# Patient Record
Sex: FEMALE | Race: WHITE | Employment: OTHER | URBAN - METROPOLITAN AREA
[De-identification: names, ages, dates, MRNs, and addresses within clinical notes are randomized per-mention and may not be internally consistent; named-entity substitution may affect disease eponyms.]

---

## 2017-03-15 ENCOUNTER — OP HISTORICAL/CONVERTED ENCOUNTER (OUTPATIENT)
Dept: OTHER | Age: 82
End: 2017-03-15

## 2017-06-22 ENCOUNTER — OP HISTORICAL/CONVERTED ENCOUNTER (OUTPATIENT)
Dept: OTHER | Age: 82
End: 2017-06-22

## 2017-09-01 ENCOUNTER — OP HISTORICAL/CONVERTED ENCOUNTER (OUTPATIENT)
Dept: OTHER | Age: 82
End: 2017-09-01

## 2017-12-01 ENCOUNTER — OP HISTORICAL/CONVERTED ENCOUNTER (OUTPATIENT)
Dept: OTHER | Age: 82
End: 2017-12-01

## 2018-03-17 ENCOUNTER — ED HISTORICAL/CONVERTED ENCOUNTER (OUTPATIENT)
Dept: OTHER | Age: 83
End: 2018-03-17

## 2018-05-30 ENCOUNTER — OP HISTORICAL/CONVERTED ENCOUNTER (OUTPATIENT)
Dept: OTHER | Age: 83
End: 2018-05-30

## 2018-06-25 ENCOUNTER — OP HISTORICAL/CONVERTED ENCOUNTER (OUTPATIENT)
Dept: OTHER | Age: 83
End: 2018-06-25

## 2018-11-27 ENCOUNTER — OP HISTORICAL/CONVERTED ENCOUNTER (OUTPATIENT)
Dept: OTHER | Age: 83
End: 2018-11-27

## 2020-01-01 ENCOUNTER — APPOINTMENT (OUTPATIENT)
Dept: CT IMAGING | Age: 85
DRG: 689 | End: 2020-01-01
Attending: EMERGENCY MEDICINE
Payer: MEDICARE

## 2020-01-01 ENCOUNTER — APPOINTMENT (OUTPATIENT)
Dept: GENERAL RADIOLOGY | Age: 85
DRG: 689 | End: 2020-01-01
Attending: INTERNAL MEDICINE
Payer: MEDICARE

## 2020-01-01 ENCOUNTER — HOSPICE ADMISSION (OUTPATIENT)
Dept: HOSPICE | Facility: HOSPICE | Age: 85
End: 2020-01-01
Payer: MEDICARE

## 2020-01-01 ENCOUNTER — HOME CARE VISIT (OUTPATIENT)
Dept: HOSPICE | Facility: HOSPICE | Age: 85
End: 2020-01-01
Payer: MEDICARE

## 2020-01-01 ENCOUNTER — HOME CARE VISIT (OUTPATIENT)
Dept: SCHEDULING | Facility: HOME HEALTH | Age: 85
End: 2020-01-01
Payer: MEDICARE

## 2020-01-01 ENCOUNTER — APPOINTMENT (OUTPATIENT)
Dept: NON INVASIVE DIAGNOSTICS | Age: 85
DRG: 689 | End: 2020-01-01
Attending: INTERNAL MEDICINE
Payer: MEDICARE

## 2020-01-01 ENCOUNTER — HOME CARE VISIT (OUTPATIENT)
Dept: HOME HEALTH SERVICES | Facility: HOME HEALTH | Age: 85
End: 2020-01-01
Payer: MEDICARE

## 2020-01-01 ENCOUNTER — OP HISTORICAL/CONVERTED ENCOUNTER (OUTPATIENT)
Dept: OTHER | Age: 85
End: 2020-01-01

## 2020-01-01 ENCOUNTER — HOSPITAL ENCOUNTER (EMERGENCY)
Age: 85
Discharge: HOME | DRG: 689 | End: 2020-09-26
Attending: EMERGENCY MEDICINE | Admitting: EMERGENCY MEDICINE
Payer: MEDICARE

## 2020-01-01 ENCOUNTER — ED HISTORICAL/CONVERTED ENCOUNTER (OUTPATIENT)
Dept: OTHER | Age: 85
End: 2020-01-01

## 2020-01-01 ENCOUNTER — ANESTHESIA EVENT (OUTPATIENT)
Dept: ENDOSCOPY | Age: 85
DRG: 689 | End: 2020-01-01
Payer: MEDICARE

## 2020-01-01 ENCOUNTER — APPOINTMENT (OUTPATIENT)
Dept: MRI IMAGING | Age: 85
DRG: 689 | End: 2020-01-01
Attending: EMERGENCY MEDICINE
Payer: MEDICARE

## 2020-01-01 ENCOUNTER — APPOINTMENT (OUTPATIENT)
Dept: GENERAL RADIOLOGY | Age: 85
DRG: 689 | End: 2020-01-01
Attending: NURSE PRACTITIONER
Payer: MEDICARE

## 2020-01-01 ENCOUNTER — HOSPITAL ENCOUNTER (EMERGENCY)
Age: 85
Discharge: HOME | End: 2020-09-24
Attending: STUDENT IN AN ORGANIZED HEALTH CARE EDUCATION/TRAINING PROGRAM
Payer: MEDICARE

## 2020-01-01 ENCOUNTER — HOSPITAL ENCOUNTER (INPATIENT)
Age: 85
LOS: 16 days | Discharge: HOSPICE | DRG: 689 | End: 2020-10-15
Attending: EMERGENCY MEDICINE | Admitting: HOSPITALIST
Payer: MEDICARE

## 2020-01-01 ENCOUNTER — HOSPITAL ENCOUNTER (INPATIENT)
Age: 85
LOS: 1 days | Discharge: HOSPICE | DRG: 951 | End: 2020-10-16
Attending: FAMILY MEDICINE | Admitting: FAMILY MEDICINE
Payer: OTHER MISCELLANEOUS

## 2020-01-01 ENCOUNTER — ANESTHESIA (OUTPATIENT)
Dept: ENDOSCOPY | Age: 85
DRG: 689 | End: 2020-01-01
Payer: MEDICARE

## 2020-01-01 ENCOUNTER — HOME HEALTH ADMISSION (OUTPATIENT)
Dept: HOME HEALTH SERVICES | Facility: HOME HEALTH | Age: 85
End: 2020-01-01
Payer: MEDICARE

## 2020-01-01 ENCOUNTER — APPOINTMENT (OUTPATIENT)
Dept: GENERAL RADIOLOGY | Age: 85
DRG: 689 | End: 2020-01-01
Attending: EMERGENCY MEDICINE
Payer: MEDICARE

## 2020-01-01 ENCOUNTER — APPOINTMENT (OUTPATIENT)
Dept: GENERAL RADIOLOGY | Age: 85
DRG: 689 | End: 2020-01-01
Attending: HOSPITALIST
Payer: MEDICARE

## 2020-01-01 ENCOUNTER — APPOINTMENT (OUTPATIENT)
Dept: CT IMAGING | Age: 85
DRG: 689 | End: 2020-01-01
Attending: HOSPITALIST
Payer: MEDICARE

## 2020-01-01 ENCOUNTER — IP HISTORICAL/CONVERTED ENCOUNTER (OUTPATIENT)
Dept: OTHER | Age: 85
End: 2020-01-01

## 2020-01-01 ENCOUNTER — APPOINTMENT (OUTPATIENT)
Dept: GENERAL RADIOLOGY | Age: 85
End: 2020-01-01
Attending: STUDENT IN AN ORGANIZED HEALTH CARE EDUCATION/TRAINING PROGRAM
Payer: MEDICARE

## 2020-01-01 VITALS
OXYGEN SATURATION: 100 % | BODY MASS INDEX: 22.6 KG/M2 | HEIGHT: 62 IN | HEART RATE: 79 BPM | WEIGHT: 122.8 LBS | SYSTOLIC BLOOD PRESSURE: 123 MMHG | TEMPERATURE: 98.5 F | RESPIRATION RATE: 18 BRPM | DIASTOLIC BLOOD PRESSURE: 89 MMHG

## 2020-01-01 VITALS
HEIGHT: 62 IN | RESPIRATION RATE: 22 BRPM | WEIGHT: 130 LBS | DIASTOLIC BLOOD PRESSURE: 83 MMHG | TEMPERATURE: 97.6 F | SYSTOLIC BLOOD PRESSURE: 129 MMHG | BODY MASS INDEX: 23.92 KG/M2 | OXYGEN SATURATION: 90 % | HEART RATE: 111 BPM

## 2020-01-01 VITALS
SYSTOLIC BLOOD PRESSURE: 100 MMHG | BODY MASS INDEX: 23.92 KG/M2 | DIASTOLIC BLOOD PRESSURE: 71 MMHG | RESPIRATION RATE: 24 BRPM | OXYGEN SATURATION: 87 % | HEART RATE: 120 BPM | WEIGHT: 130 LBS | HEIGHT: 62 IN | TEMPERATURE: 95.3 F

## 2020-01-01 VITALS
RESPIRATION RATE: 16 BRPM | HEART RATE: 90 BPM | OXYGEN SATURATION: 96 % | TEMPERATURE: 98.2 F | DIASTOLIC BLOOD PRESSURE: 60 MMHG | SYSTOLIC BLOOD PRESSURE: 108 MMHG

## 2020-01-01 VITALS
SYSTOLIC BLOOD PRESSURE: 110 MMHG | DIASTOLIC BLOOD PRESSURE: 64 MMHG | WEIGHT: 125 LBS | HEART RATE: 104 BPM | RESPIRATION RATE: 16 BRPM | OXYGEN SATURATION: 98 % | TEMPERATURE: 96.9 F

## 2020-01-01 VITALS
DIASTOLIC BLOOD PRESSURE: 77 MMHG | TEMPERATURE: 98 F | SYSTOLIC BLOOD PRESSURE: 128 MMHG | WEIGHT: 125 LBS | HEIGHT: 62 IN | RESPIRATION RATE: 15 BRPM | OXYGEN SATURATION: 97 % | HEART RATE: 99 BPM | BODY MASS INDEX: 23 KG/M2

## 2020-01-01 VITALS
OXYGEN SATURATION: 75 % | TEMPERATURE: 98.1 F | HEART RATE: 119 BPM | RESPIRATION RATE: 20 BRPM | DIASTOLIC BLOOD PRESSURE: 120 MMHG | SYSTOLIC BLOOD PRESSURE: 162 MMHG

## 2020-01-01 DIAGNOSIS — Z51.5 HOSPICE CARE: ICD-10-CM

## 2020-01-01 DIAGNOSIS — G45.9 TIA (TRANSIENT ISCHEMIC ATTACK): ICD-10-CM

## 2020-01-01 DIAGNOSIS — N28.9 RENAL INSUFFICIENCY: ICD-10-CM

## 2020-01-01 DIAGNOSIS — R11.0 NAUSEA WITHOUT VOMITING: Primary | ICD-10-CM

## 2020-01-01 DIAGNOSIS — R47.81 SLURRED SPEECH: ICD-10-CM

## 2020-01-01 DIAGNOSIS — I50.84 END STAGE HEART FAILURE (HCC): ICD-10-CM

## 2020-01-01 DIAGNOSIS — I48.20 CHRONIC ATRIAL FIBRILLATION (HCC): ICD-10-CM

## 2020-01-01 DIAGNOSIS — R47.81 SLURRED SPEECH: Primary | ICD-10-CM

## 2020-01-01 DIAGNOSIS — R29.898 WEAKNESS OF EXTREMITY: ICD-10-CM

## 2020-01-01 DIAGNOSIS — R25.1 EPISODE OF SHAKING: Primary | ICD-10-CM

## 2020-01-01 DIAGNOSIS — R06.02 SHORTNESS OF BREATH: ICD-10-CM

## 2020-01-01 DIAGNOSIS — N39.0 URINARY TRACT INFECTION WITHOUT HEMATURIA, SITE UNSPECIFIED: ICD-10-CM

## 2020-01-01 DIAGNOSIS — E04.1 THYROID CYST: ICD-10-CM

## 2020-01-01 DIAGNOSIS — R53.1 WEAKNESS: ICD-10-CM

## 2020-01-01 LAB
ALBUMIN SERPL-MCNC: 2.3 G/DL (ref 3.5–5)
ALBUMIN SERPL-MCNC: 2.4 G/DL (ref 3.5–5)
ALBUMIN SERPL-MCNC: 2.5 G/DL (ref 3.5–5)
ALBUMIN SERPL-MCNC: 2.8 G/DL (ref 3.5–5)
ALBUMIN SERPL-MCNC: 3.3 G/DL (ref 3.5–5)
ALBUMIN/GLOB SERPL: 0.7 {RATIO} (ref 1.1–2.2)
ALBUMIN/GLOB SERPL: 0.8 {RATIO} (ref 1.1–2.2)
ALP SERPL-CCNC: 110 U/L (ref 45–117)
ALP SERPL-CCNC: 114 U/L (ref 45–117)
ALP SERPL-CCNC: 88 U/L (ref 45–117)
ALP SERPL-CCNC: 95 U/L (ref 45–117)
ALP SERPL-CCNC: 96 U/L (ref 45–117)
ALT SERPL-CCNC: 24 U/L (ref 12–78)
ALT SERPL-CCNC: 24 U/L (ref 12–78)
ALT SERPL-CCNC: 25 U/L (ref 12–78)
ALT SERPL-CCNC: 26 U/L (ref 12–78)
ALT SERPL-CCNC: 36 U/L (ref 12–78)
ANION GAP SERPL CALC-SCNC: 10 MMOL/L (ref 5–15)
ANION GAP SERPL CALC-SCNC: 4 MMOL/L (ref 5–15)
ANION GAP SERPL CALC-SCNC: 5 MMOL/L (ref 5–15)
ANION GAP SERPL CALC-SCNC: 5 MMOL/L (ref 5–15)
ANION GAP SERPL CALC-SCNC: 6 MMOL/L (ref 5–15)
ANION GAP SERPL CALC-SCNC: 7 MMOL/L (ref 5–15)
ANION GAP SERPL CALC-SCNC: 8 MMOL/L (ref 5–15)
ANION GAP SERPL CALC-SCNC: 8 MMOL/L (ref 5–15)
APPEARANCE UR: ABNORMAL
APPEARANCE UR: CLEAR
ARTERIAL PATENCY WRIST A: YES
ARTERIAL PATENCY WRIST A: YES
AST SERPL W P-5'-P-CCNC: 23 U/L (ref 15–37)
AST SERPL-CCNC: 17 U/L (ref 15–37)
AST SERPL-CCNC: 23 U/L (ref 15–37)
AST SERPL-CCNC: 30 U/L (ref 15–37)
AST SERPL-CCNC: 35 U/L (ref 15–37)
ATRIAL RATE: 117 BPM
ATRIAL RATE: 119 BPM
ATRIAL RATE: 156 BPM
ATRIAL RATE: 227 BPM
ATRIAL RATE: 286 BPM
ATRIAL RATE: 97 BPM
BACTERIA SPEC CULT: ABNORMAL
BACTERIA SPEC CULT: NORMAL
BACTERIA URNS QL MICRO: ABNORMAL /HPF
BACTERIA URNS QL MICRO: NEGATIVE /HPF
BASE DEFICIT BLD-SCNC: 1 MMOL/L
BASE EXCESS BLD CALC-SCNC: 2 MMOL/L
BASOPHILS # BLD: 0 K/UL (ref 0–0.1)
BASOPHILS # BLD: 0.1 K/UL (ref 0–0.1)
BASOPHILS NFR BLD: 0 % (ref 0–1)
BASOPHILS NFR BLD: 1 % (ref 0–1)
BDY SITE: ABNORMAL
BDY SITE: ABNORMAL
BILIRUB SERPL-MCNC: 0.7 MG/DL (ref 0.2–1)
BILIRUB SERPL-MCNC: 0.8 MG/DL (ref 0.2–1)
BILIRUB SERPL-MCNC: 0.8 MG/DL (ref 0.2–1)
BILIRUB SERPL-MCNC: 1.3 MG/DL (ref 0.2–1)
BILIRUB SERPL-MCNC: 1.4 MG/DL (ref 0.2–1)
BILIRUB UR QL: NEGATIVE
BILIRUB UR QL: NEGATIVE
BNP SERPL-MCNC: 8279 PG/ML
BUN SERPL-MCNC: 10 MG/DL (ref 6–20)
BUN SERPL-MCNC: 10 MG/DL (ref 6–20)
BUN SERPL-MCNC: 11 MG/DL (ref 6–20)
BUN SERPL-MCNC: 12 MG/DL (ref 6–20)
BUN SERPL-MCNC: 13 MG/DL (ref 6–20)
BUN SERPL-MCNC: 14 MG/DL (ref 6–20)
BUN SERPL-MCNC: 16 MG/DL (ref 6–20)
BUN SERPL-MCNC: 16 MG/DL (ref 6–20)
BUN SERPL-MCNC: 17 MG/DL (ref 6–20)
BUN SERPL-MCNC: 18 MG/DL (ref 6–20)
BUN SERPL-MCNC: 8 MG/DL (ref 6–20)
BUN SERPL-MCNC: 9 MG/DL (ref 6–20)
BUN/CREAT SERPL: 11 (ref 12–20)
BUN/CREAT SERPL: 12 (ref 12–20)
BUN/CREAT SERPL: 13 (ref 12–20)
BUN/CREAT SERPL: 14 (ref 12–20)
BUN/CREAT SERPL: 14 (ref 12–20)
BUN/CREAT SERPL: 15 (ref 12–20)
BUN/CREAT SERPL: 16 (ref 12–20)
BUN/CREAT SERPL: 17 (ref 12–20)
BUN/CREAT SERPL: 18 (ref 12–20)
CA-I BLD-MCNC: 9.3 MG/DL (ref 8.5–10.1)
CA-I BLD-SCNC: 1.16 MMOL/L (ref 1.12–1.32)
CA-I BLD-SCNC: 1.27 MMOL/L (ref 1.12–1.32)
CALCIUM SERPL-MCNC: 7.9 MG/DL (ref 8.5–10.1)
CALCIUM SERPL-MCNC: 8 MG/DL (ref 8.5–10.1)
CALCIUM SERPL-MCNC: 8 MG/DL (ref 8.5–10.1)
CALCIUM SERPL-MCNC: 8.1 MG/DL (ref 8.5–10.1)
CALCIUM SERPL-MCNC: 8.1 MG/DL (ref 8.5–10.1)
CALCIUM SERPL-MCNC: 8.2 MG/DL (ref 8.5–10.1)
CALCIUM SERPL-MCNC: 8.3 MG/DL (ref 8.5–10.1)
CALCIUM SERPL-MCNC: 8.3 MG/DL (ref 8.5–10.1)
CALCIUM SERPL-MCNC: 8.5 MG/DL (ref 8.5–10.1)
CALCIUM SERPL-MCNC: 8.6 MG/DL (ref 8.5–10.1)
CALCIUM SERPL-MCNC: 8.7 MG/DL (ref 8.5–10.1)
CALCULATED R AXIS, ECG10: 10 DEGREES
CALCULATED R AXIS, ECG10: 37 DEGREES
CALCULATED R AXIS, ECG10: 4 DEGREES
CALCULATED R AXIS, ECG10: 43 DEGREES
CALCULATED R AXIS, ECG10: 45 DEGREES
CALCULATED R AXIS, ECG10: 9 DEGREES
CALCULATED T AXIS, ECG11: -102 DEGREES
CALCULATED T AXIS, ECG11: -27 DEGREES
CALCULATED T AXIS, ECG11: -33 DEGREES
CALCULATED T AXIS, ECG11: -82 DEGREES
CALCULATED T AXIS, ECG11: 20 DEGREES
CALCULATED T AXIS, ECG11: 82 DEGREES
CC UR VC: ABNORMAL
CHLORIDE SERPL-SCNC: 100 MMOL/L (ref 97–108)
CHLORIDE SERPL-SCNC: 101 MMOL/L (ref 97–108)
CHLORIDE SERPL-SCNC: 104 MMOL/L (ref 97–108)
CHLORIDE SERPL-SCNC: 105 MMOL/L (ref 97–108)
CHLORIDE SERPL-SCNC: 105 MMOL/L (ref 97–108)
CHLORIDE SERPL-SCNC: 106 MMOL/L (ref 97–108)
CHLORIDE SERPL-SCNC: 106 MMOL/L (ref 97–108)
CHOLEST SERPL-MCNC: 123 MG/DL
CO2 SERPL-SCNC: 23 MMOL/L (ref 21–32)
CO2 SERPL-SCNC: 24 MMOL/L (ref 21–32)
CO2 SERPL-SCNC: 25 MMOL/L (ref 21–32)
CO2 SERPL-SCNC: 25 MMOL/L (ref 21–32)
CO2 SERPL-SCNC: 26 MMOL/L (ref 21–32)
CO2 SERPL-SCNC: 27 MMOL/L (ref 21–32)
CO2 SERPL-SCNC: 28 MMOL/L (ref 21–32)
COLOR UR: ABNORMAL
COLOR UR: ABNORMAL
COMMENT, HOLDF: NORMAL
COVID-19, XGCOVT: NOT DETECTED
CREAT SERPL-MCNC: 0.63 MG/DL (ref 0.55–1.02)
CREAT SERPL-MCNC: 0.75 MG/DL (ref 0.55–1.02)
CREAT SERPL-MCNC: 0.78 MG/DL (ref 0.55–1.02)
CREAT SERPL-MCNC: 0.78 MG/DL (ref 0.55–1.02)
CREAT SERPL-MCNC: 0.79 MG/DL (ref 0.55–1.02)
CREAT SERPL-MCNC: 0.83 MG/DL (ref 0.55–1.02)
CREAT SERPL-MCNC: 0.83 MG/DL (ref 0.55–1.02)
CREAT SERPL-MCNC: 0.84 MG/DL (ref 0.55–1.02)
CREAT SERPL-MCNC: 0.89 MG/DL (ref 0.55–1.02)
CREAT SERPL-MCNC: 0.9 MG/DL (ref 0.55–1.02)
CREAT SERPL-MCNC: 0.98 MG/DL (ref 0.55–1.02)
CREAT SERPL-MCNC: 1 MG/DL (ref 0.55–1.02)
CREAT SERPL-MCNC: 1.13 MG/DL (ref 0.55–1.02)
CREAT SERPL-MCNC: 1.21 MG/DL (ref 0.55–1.02)
DIAGNOSIS, 93000: NORMAL
DIFFERENTIAL METHOD BLD: ABNORMAL
ECHO AR MAX VEL PISA: 375.42 CM/S
ECHO AV REGURGITANT PHT: 0.71 S
ECHO LA AREA 4C: 31.38 CM2
ECHO LA MAJOR AXIS: 4.61 CM
ECHO LA MINOR AXIS: 2.9 CM
ECHO LA TO AORTIC ROOT RATIO: 1.66
ECHO LA TO AORTIC ROOT RATIO: 1.66
ECHO LA VOL 4C: 117.2 ML (ref 22–52)
ECHO LA VOLUME INDEX A4C: 73.63 ML/M2 (ref 16–28)
ECHO LV INTERNAL DIMENSION DIASTOLIC: 3.9 CM (ref 3.9–5.3)
ECHO LV INTERNAL DIMENSION SYSTOLIC: 3.05 CM
ECHO LV IVSD: 1 CM (ref 0.6–0.9)
ECHO LV MASS 2D: 115.3 G (ref 67–162)
ECHO LV MASS INDEX 2D: 72.4 G/M2 (ref 43–95)
ECHO LV POSTERIOR WALL DIASTOLIC: 0.92 CM (ref 0.6–0.9)
ECHO LVOT PEAK GRADIENT: 0.9 MMHG
ECHO LVOT PEAK VELOCITY: 47.54 CM/S
ECHO RV INTERNAL DIMENSION: 3.87 CM
ECHO RV TAPSE: 1.13 CM (ref 1.5–2)
ECHO TV REGURGITANT MAX VELOCITY: 311.44 CM/S
ECHO TV REGURGITANT MAX VELOCITY: 347.51 CM/S
ECHO TV REGURGITANT MAX VELOCITY: 356.07 CM/S
ECHO TV REGURGITANT MAX VELOCITY: 356.07 CM/S
ECHO TV REGURGITANT PEAK GRADIENT: 38.8 MMHG
ECHO TV REGURGITANT PEAK GRADIENT: 48.31 MMHG
ECHO TV REGURGITANT PEAK GRADIENT: 50.71 MMHG
ECHO TV REGURGITANT PEAK GRADIENT: 50.71 MMHG
EOSINOPHIL # BLD: 0.1 K/UL (ref 0–0.4)
EOSINOPHIL # BLD: 0.2 K/UL (ref 0–0.4)
EOSINOPHIL # BLD: 0.2 K/UL (ref 0–0.4)
EOSINOPHIL # BLD: 0.3 K/UL (ref 0–0.4)
EOSINOPHIL # BLD: 0.4 K/UL (ref 0–0.4)
EOSINOPHIL # BLD: 0.5 K/UL (ref 0–0.4)
EOSINOPHIL # BLD: 0.5 K/UL (ref 0–0.4)
EOSINOPHIL NFR BLD: 1 % (ref 0–7)
EOSINOPHIL NFR BLD: 1 % (ref 0–7)
EOSINOPHIL NFR BLD: 2 % (ref 0–7)
EOSINOPHIL NFR BLD: 3 % (ref 0–7)
EOSINOPHIL NFR BLD: 5 % (ref 0–7)
EOSINOPHIL NFR BLD: 5 % (ref 0–7)
EOSINOPHIL NFR BLD: 6 % (ref 0–7)
EPITH CASTS URNS QL MICRO: ABNORMAL /LPF
EPITH CASTS URNS QL MICRO: ABNORMAL /LPF
ERYTHROCYTE [DISTWIDTH] IN BLOOD BY AUTOMATED COUNT: 16.9 % (ref 11.5–14.5)
ERYTHROCYTE [DISTWIDTH] IN BLOOD BY AUTOMATED COUNT: 17.2 % (ref 11.5–14.5)
ERYTHROCYTE [DISTWIDTH] IN BLOOD BY AUTOMATED COUNT: 17.3 % (ref 11.5–14.5)
ERYTHROCYTE [DISTWIDTH] IN BLOOD BY AUTOMATED COUNT: 17.4 % (ref 11.5–14.5)
ERYTHROCYTE [DISTWIDTH] IN BLOOD BY AUTOMATED COUNT: 17.5 % (ref 11.5–14.5)
ERYTHROCYTE [DISTWIDTH] IN BLOOD BY AUTOMATED COUNT: 17.8 % (ref 11.5–14.5)
ERYTHROCYTE [DISTWIDTH] IN BLOOD BY AUTOMATED COUNT: 17.9 % (ref 11.5–14.5)
ERYTHROCYTE [DISTWIDTH] IN BLOOD BY AUTOMATED COUNT: 18 % (ref 11.5–14.5)
ERYTHROCYTE [DISTWIDTH] IN BLOOD BY AUTOMATED COUNT: 18.1 % (ref 11.5–14.5)
ERYTHROCYTE [DISTWIDTH] IN BLOOD BY AUTOMATED COUNT: 18.2 % (ref 11.5–14.5)
ERYTHROCYTE [DISTWIDTH] IN BLOOD BY AUTOMATED COUNT: 18.2 % (ref 11.5–14.5)
EST. AVERAGE GLUCOSE BLD GHB EST-MCNC: 114 MG/DL
GAS FLOW.O2 O2 DELIVERY SYS: ABNORMAL L/MIN
GAS FLOW.O2 O2 DELIVERY SYS: ABNORMAL L/MIN
GAS FLOW.O2 SETTING OXYMISER: 6 L/M
GLOBULIN SER CALC-MCNC: 3.2 G/DL (ref 2–4)
GLOBULIN SER CALC-MCNC: 3.4 G/DL (ref 2–4)
GLOBULIN SER CALC-MCNC: 3.7 G/DL (ref 2–4)
GLOBULIN SER CALC-MCNC: 3.9 G/DL (ref 2–4)
GLOBULIN SER CALC-MCNC: 4.3 G/DL (ref 2–4)
GLUCOSE BLD STRIP.AUTO-MCNC: 100 MG/DL (ref 65–100)
GLUCOSE BLD STRIP.AUTO-MCNC: 102 MG/DL (ref 65–100)
GLUCOSE BLD STRIP.AUTO-MCNC: 104 MG/DL (ref 65–100)
GLUCOSE BLD STRIP.AUTO-MCNC: 105 MG/DL (ref 65–100)
GLUCOSE BLD STRIP.AUTO-MCNC: 107 MG/DL (ref 65–100)
GLUCOSE BLD STRIP.AUTO-MCNC: 108 MG/DL (ref 65–100)
GLUCOSE BLD STRIP.AUTO-MCNC: 110 MG/DL (ref 65–100)
GLUCOSE BLD STRIP.AUTO-MCNC: 111 MG/DL (ref 65–100)
GLUCOSE BLD STRIP.AUTO-MCNC: 117 MG/DL (ref 65–100)
GLUCOSE BLD STRIP.AUTO-MCNC: 122 MG/DL (ref 65–100)
GLUCOSE BLD STRIP.AUTO-MCNC: 124 MG/DL (ref 65–100)
GLUCOSE BLD STRIP.AUTO-MCNC: 128 MG/DL (ref 65–100)
GLUCOSE BLD STRIP.AUTO-MCNC: 128 MG/DL (ref 65–100)
GLUCOSE BLD STRIP.AUTO-MCNC: 131 MG/DL (ref 65–100)
GLUCOSE BLD STRIP.AUTO-MCNC: 132 MG/DL (ref 65–100)
GLUCOSE BLD STRIP.AUTO-MCNC: 141 MG/DL (ref 65–100)
GLUCOSE BLD STRIP.AUTO-MCNC: 143 MG/DL (ref 65–100)
GLUCOSE BLD STRIP.AUTO-MCNC: 155 MG/DL (ref 65–100)
GLUCOSE BLD STRIP.AUTO-MCNC: 157 MG/DL (ref 65–100)
GLUCOSE BLD STRIP.AUTO-MCNC: 159 MG/DL (ref 65–100)
GLUCOSE BLD STRIP.AUTO-MCNC: 166 MG/DL (ref 65–100)
GLUCOSE BLD STRIP.AUTO-MCNC: 166 MG/DL (ref 65–100)
GLUCOSE BLD STRIP.AUTO-MCNC: 81 MG/DL (ref 65–100)
GLUCOSE BLD STRIP.AUTO-MCNC: 82 MG/DL (ref 65–100)
GLUCOSE BLD STRIP.AUTO-MCNC: 84 MG/DL (ref 65–100)
GLUCOSE BLD STRIP.AUTO-MCNC: 84 MG/DL (ref 65–100)
GLUCOSE BLD STRIP.AUTO-MCNC: 96 MG/DL (ref 65–100)
GLUCOSE BLD STRIP.AUTO-MCNC: 97 MG/DL (ref 65–100)
GLUCOSE BLD STRIP.AUTO-MCNC: 99 MG/DL (ref 65–100)
GLUCOSE SERPL-MCNC: 103 MG/DL (ref 65–100)
GLUCOSE SERPL-MCNC: 104 MG/DL (ref 65–100)
GLUCOSE SERPL-MCNC: 115 MG/DL (ref 65–100)
GLUCOSE SERPL-MCNC: 115 MG/DL (ref 65–100)
GLUCOSE SERPL-MCNC: 125 MG/DL (ref 65–100)
GLUCOSE SERPL-MCNC: 132 MG/DL (ref 65–100)
GLUCOSE SERPL-MCNC: 134 MG/DL (ref 65–100)
GLUCOSE SERPL-MCNC: 135 MG/DL (ref 65–100)
GLUCOSE SERPL-MCNC: 157 MG/DL (ref 65–100)
GLUCOSE SERPL-MCNC: 160 MG/DL (ref 65–100)
GLUCOSE SERPL-MCNC: 90 MG/DL (ref 65–100)
GLUCOSE SERPL-MCNC: 91 MG/DL (ref 65–100)
GLUCOSE SERPL-MCNC: 94 MG/DL (ref 65–100)
GLUCOSE SERPL-MCNC: 95 MG/DL (ref 65–100)
GLUCOSE UR STRIP.AUTO-MCNC: NEGATIVE MG/DL
GLUCOSE UR STRIP.AUTO-MCNC: NEGATIVE MG/DL
HBA1C MFR BLD: 5.6 % (ref 4–5.6)
HCO3 BLD-SCNC: 22.9 MMOL/L (ref 22–26)
HCO3 BLD-SCNC: 26 MMOL/L (ref 22–26)
HCT VFR BLD AUTO: 38.1 % (ref 35–47)
HCT VFR BLD AUTO: 39 % (ref 35–47)
HCT VFR BLD AUTO: 39.2 % (ref 35–47)
HCT VFR BLD AUTO: 39.7 % (ref 35–47)
HCT VFR BLD AUTO: 39.9 % (ref 35–47)
HCT VFR BLD AUTO: 39.9 % (ref 35–47)
HCT VFR BLD AUTO: 40.5 % (ref 35–47)
HCT VFR BLD AUTO: 40.7 % (ref 35–47)
HCT VFR BLD AUTO: 41.5 % (ref 35–47)
HCT VFR BLD AUTO: 43.1 % (ref 35–47)
HCT VFR BLD AUTO: 43.9 % (ref 35–47)
HCT VFR BLD AUTO: 45.3 % (ref 35–47)
HCT VFR BLD AUTO: 46 % (ref 35–47)
HDLC SERPL-MCNC: 53 MG/DL
HDLC SERPL: 2.3 {RATIO} (ref 0–5)
HEALTH STATUS, XMCV2T: NORMAL
HGB BLD-MCNC: 12.7 G/DL (ref 11.5–16)
HGB BLD-MCNC: 13 G/DL (ref 11.5–16)
HGB BLD-MCNC: 13.1 G/DL (ref 11.5–16)
HGB BLD-MCNC: 13.1 G/DL (ref 11.5–16)
HGB BLD-MCNC: 13.4 G/DL (ref 11.5–16)
HGB BLD-MCNC: 13.8 G/DL (ref 11.5–16)
HGB BLD-MCNC: 13.8 G/DL (ref 11.5–16)
HGB BLD-MCNC: 14.5 % (ref 11.5–16)
HGB BLD-MCNC: 14.6 G/DL (ref 11.5–16)
HGB BLD-MCNC: 14.8 G/DL (ref 11.5–16)
HGB BLD-MCNC: 14.9 G/DL (ref 11.5–16)
HGB UR QL STRIP: ABNORMAL
HGB UR QL STRIP: ABNORMAL
HYALINE CASTS URNS QL MICRO: ABNORMAL /LPF (ref 0–5)
IMM GRANULOCYTES # BLD AUTO: 0.1 K/UL (ref 0–0.04)
IMM GRANULOCYTES NFR BLD AUTO: 1 % (ref 0–0.5)
INR PPP: 1.1 (ref 0.9–1.1)
INR PPP: 1.2 (ref 0.9–1.1)
INR PPP: 1.4 (ref 0.9–1.1)
KETONES UR QL STRIP.AUTO: NEGATIVE MG/DL
KETONES UR QL STRIP.AUTO: NEGATIVE MG/DL
LDLC SERPL CALC-MCNC: 55.4 MG/DL (ref 0–100)
LEUKOCYTE ESTERASE UR QL STRIP.AUTO: ABNORMAL
LEUKOCYTE ESTERASE UR QL STRIP.AUTO: ABNORMAL
LIPID PROFILE,FLP: NORMAL
LYMPHOCYTES # BLD: 1 K/UL (ref 0.8–3.5)
LYMPHOCYTES # BLD: 1.1 K/UL (ref 0.8–3.5)
LYMPHOCYTES # BLD: 1.1 K/UL (ref 0.8–3.5)
LYMPHOCYTES # BLD: 1.2 K/UL (ref 0.8–3.5)
LYMPHOCYTES # BLD: 1.2 K/UL (ref 0.8–3.5)
LYMPHOCYTES # BLD: 1.4 K/UL (ref 0.8–3.5)
LYMPHOCYTES NFR BLD: 10 % (ref 12–49)
LYMPHOCYTES NFR BLD: 11 % (ref 12–49)
LYMPHOCYTES NFR BLD: 11 % (ref 12–49)
LYMPHOCYTES NFR BLD: 12 % (ref 12–49)
LYMPHOCYTES NFR BLD: 14 % (ref 12–49)
LYMPHOCYTES NFR BLD: 15 % (ref 12–49)
LYMPHOCYTES NFR BLD: 9 % (ref 12–49)
MAGNESIUM SERPL-MCNC: 1.8 MG/DL (ref 1.6–2.4)
MAGNESIUM SERPL-MCNC: 1.8 MG/DL (ref 1.6–2.4)
MCH RBC QN AUTO: 32.7 PG (ref 26–34)
MCH RBC QN AUTO: 32.8 PG (ref 26–34)
MCH RBC QN AUTO: 32.9 PG (ref 26–34)
MCH RBC QN AUTO: 32.9 PG (ref 26–34)
MCH RBC QN AUTO: 33.1 PG (ref 26–34)
MCH RBC QN AUTO: 33.2 PG (ref 26–34)
MCH RBC QN AUTO: 33.2 PG (ref 26–34)
MCH RBC QN AUTO: 33.3 PG (ref 26–34)
MCH RBC QN AUTO: 33.3 PG (ref 26–34)
MCH RBC QN AUTO: 33.4 PG (ref 26–34)
MCH RBC QN AUTO: 33.4 PG (ref 26–34)
MCH RBC QN AUTO: 33.6 PG (ref 26–34)
MCH RBC QN AUTO: 33.7 PG (ref 26–34)
MCHC RBC AUTO-ENTMCNC: 32.4 G/DL (ref 30–36.5)
MCHC RBC AUTO-ENTMCNC: 32.7 G/DL (ref 30–36.5)
MCHC RBC AUTO-ENTMCNC: 32.8 G/DL (ref 30–36.5)
MCHC RBC AUTO-ENTMCNC: 32.9 G/DL (ref 30–36.5)
MCHC RBC AUTO-ENTMCNC: 33.3 G/DL (ref 30–36.5)
MCHC RBC AUTO-ENTMCNC: 33.4 G/DL (ref 30–36.5)
MCHC RBC AUTO-ENTMCNC: 33.6 G/DL (ref 30–36.5)
MCHC RBC AUTO-ENTMCNC: 33.6 G/DL (ref 30–36.5)
MCHC RBC AUTO-ENTMCNC: 33.8 G/DL (ref 30–36.5)
MCHC RBC AUTO-ENTMCNC: 34.1 G/DL (ref 30–36.5)
MCV RBC AUTO: 100 FL (ref 80–99)
MCV RBC AUTO: 100.9 FL (ref 80–99)
MCV RBC AUTO: 102.4 FL (ref 80–99)
MCV RBC AUTO: 97.4 FL (ref 80–99)
MCV RBC AUTO: 98.5 FL (ref 80–99)
MCV RBC AUTO: 99.2 FL (ref 80–99)
MCV RBC AUTO: 99.3 FL (ref 80–99)
MCV RBC AUTO: 99.5 FL (ref 80–99)
MCV RBC AUTO: 99.7 FL (ref 80–99)
MONOCYTES # BLD: 0.6 K/UL (ref 0–1)
MONOCYTES # BLD: 0.8 K/UL (ref 0–1)
MONOCYTES # BLD: 0.9 K/UL (ref 0–1)
MONOCYTES # BLD: 1 K/UL (ref 0–1)
MONOCYTES # BLD: 1 K/UL (ref 0–1)
MONOCYTES # BLD: 1.1 K/UL (ref 0–1)
MONOCYTES NFR BLD: 10 % (ref 5–13)
MONOCYTES NFR BLD: 11 % (ref 5–13)
MONOCYTES NFR BLD: 12 % (ref 5–13)
MONOCYTES NFR BLD: 8 % (ref 5–13)
MONOCYTES NFR BLD: 9 % (ref 5–13)
NEUTS SEG # BLD: 5.8 K/UL (ref 1.8–8)
NEUTS SEG # BLD: 5.9 K/UL (ref 1.8–8)
NEUTS SEG # BLD: 6.4 K/UL (ref 1.8–8)
NEUTS SEG # BLD: 6.8 K/UL (ref 1.8–8)
NEUTS SEG # BLD: 7.3 K/UL (ref 1.8–8)
NEUTS SEG # BLD: 7.5 K/UL (ref 1.8–8)
NEUTS SEG # BLD: 8.9 K/UL (ref 1.8–8)
NEUTS SEG # BLD: 9.6 K/UL (ref 1.8–8)
NEUTS SEG NFR BLD: 69 % (ref 32–75)
NEUTS SEG NFR BLD: 71 % (ref 32–75)
NEUTS SEG NFR BLD: 72 % (ref 32–75)
NEUTS SEG NFR BLD: 73 % (ref 32–75)
NEUTS SEG NFR BLD: 74 % (ref 32–75)
NEUTS SEG NFR BLD: 74 % (ref 32–75)
NEUTS SEG NFR BLD: 75 % (ref 32–75)
NEUTS SEG NFR BLD: 77 % (ref 32–75)
NEUTS SEG NFR BLD: 78 % (ref 32–75)
NEUTS SEG NFR BLD: 80 % (ref 32–75)
NITRITE UR QL STRIP.AUTO: NEGATIVE
NITRITE UR QL STRIP.AUTO: POSITIVE
NRBC # BLD: 0 K/UL (ref 0–0.01)
NRBC # BLD: 0.02 K/UL (ref 0–0.01)
NRBC # BLD: 0.04 K/UL (ref 0–0.01)
NRBC # BLD: 0.04 K/UL (ref 0–0.01)
NRBC BLD-RTO: 0 PER 100 WBC
NRBC BLD-RTO: 0.2 PER 100 WBC
NRBC BLD-RTO: 0.4 PER 100 WBC
NRBC BLD-RTO: 0.4 PER 100 WBC
O2/TOTAL GAS SETTING VFR VENT: 0.21 %
PCO2 BLD: 32.8 MMHG (ref 35–45)
PCO2 BLD: 37.6 MMHG (ref 35–45)
PH BLD: 7.45 [PH] (ref 7.35–7.45)
PH BLD: 7.45 [PH] (ref 7.35–7.45)
PH UR STRIP: 6.5 [PH] (ref 5–8)
PH UR STRIP: 7.5 [PH] (ref 5–8)
PHOSPHATE SERPL-MCNC: 3 MG/DL (ref 2.6–4.7)
PLATELET # BLD AUTO: 163 K/UL (ref 150–400)
PLATELET # BLD AUTO: 168 K/UL (ref 150–400)
PLATELET # BLD AUTO: 175 K/UL (ref 150–400)
PLATELET # BLD AUTO: 184 K/UL (ref 150–400)
PLATELET # BLD AUTO: 193 K/UL (ref 150–400)
PLATELET # BLD AUTO: 193 K/UL (ref 150–400)
PLATELET # BLD AUTO: 202 K/UL (ref 150–400)
PLATELET # BLD AUTO: 217 K/UL (ref 150–400)
PLATELET # BLD AUTO: 220 K/UL (ref 150–400)
PLATELET # BLD AUTO: 248 K/UL (ref 150–400)
PLATELET # BLD AUTO: 270 K/UL (ref 150–400)
PLATELET # BLD AUTO: 278 K/UL (ref 150–400)
PLATELET # BLD AUTO: 358 K/UL (ref 150–400)
PMV BLD AUTO: 10.2 FL (ref 8.9–12.9)
PMV BLD AUTO: 10.3 FL (ref 8.9–12.9)
PMV BLD AUTO: 10.4 FL (ref 8.9–12.9)
PMV BLD AUTO: 10.4 FL (ref 8.9–12.9)
PMV BLD AUTO: 10.6 FL (ref 8.9–12.9)
PMV BLD AUTO: 10.8 FL (ref 8.9–12.9)
PMV BLD AUTO: 10.9 FL (ref 8.9–12.9)
PO2 BLD: 66 MMHG (ref 80–100)
PO2 BLD: 67 MMHG (ref 80–100)
POTASSIUM SERPL-SCNC: 2.9 MMOL/L (ref 3.5–5.1)
POTASSIUM SERPL-SCNC: 3 MMOL/L (ref 3.5–5.1)
POTASSIUM SERPL-SCNC: 3.1 MMOL/L (ref 3.5–5.1)
POTASSIUM SERPL-SCNC: 3.1 MMOL/L (ref 3.5–5.1)
POTASSIUM SERPL-SCNC: 3.2 MMOL/L (ref 3.5–5.1)
POTASSIUM SERPL-SCNC: 3.3 MMOL/L (ref 3.5–5.1)
POTASSIUM SERPL-SCNC: 3.6 MMOL/L (ref 3.5–5.1)
POTASSIUM SERPL-SCNC: 3.8 MMOL/L (ref 3.5–5.1)
POTASSIUM SERPL-SCNC: 3.9 MMOL/L (ref 3.5–5.1)
POTASSIUM SERPL-SCNC: 4.1 MMOL/L (ref 3.5–5.1)
POTASSIUM SERPL-SCNC: 4.1 MMOL/L (ref 3.5–5.1)
POTASSIUM SERPL-SCNC: 4.3 MMOL/L (ref 3.5–5.1)
POTASSIUM SERPL-SCNC: 4.4 MMOL/L (ref 3.5–5.1)
POTASSIUM SERPL-SCNC: 4.6 MMOL/L (ref 3.5–5.1)
PROT SERPL-MCNC: 5.5 G/DL (ref 6.4–8.2)
PROT SERPL-MCNC: 5.8 G/DL (ref 6.4–8.2)
PROT SERPL-MCNC: 6.2 G/DL (ref 6.4–8.2)
PROT SERPL-MCNC: 6.7 G/DL (ref 6.4–8.2)
PROT SERPL-MCNC: 7.6 G/DL (ref 6.4–8.2)
PROT UR STRIP-MCNC: ABNORMAL MG/DL
PROT UR STRIP-MCNC: NEGATIVE MG/DL
PROTHROMBIN TIME: 11.5 SEC (ref 9–11.1)
PROTHROMBIN TIME: 11.8 SEC (ref 9–11.1)
PROTHROMBIN TIME: 17.5 SEC (ref 11.9–14.7)
Q-T INTERVAL, ECG07: 370 MS
Q-T INTERVAL, ECG07: 374 MS
Q-T INTERVAL, ECG07: 382 MS
Q-T INTERVAL, ECG07: 384 MS
Q-T INTERVAL, ECG07: 402 MS
Q-T INTERVAL, ECG07: 418 MS
QRS DURATION, ECG06: 88 MS
QRS DURATION, ECG06: 90 MS
QRS DURATION, ECG06: 92 MS
QRS DURATION, ECG06: 96 MS
QRS DURATION, ECG06: 96 MS
QRS DURATION, ECG06: 98 MS
QTC CALCULATION (BEZET), ECG08: 490 MS
QTC CALCULATION (BEZET), ECG08: 491 MS
QTC CALCULATION (BEZET), ECG08: 497 MS
QTC CALCULATION (BEZET), ECG08: 502 MS
QTC CALCULATION (BEZET), ECG08: 515 MS
QTC CALCULATION (BEZET), ECG08: 547 MS
RBC # BLD AUTO: 3.84 M/UL (ref 3.8–5.2)
RBC # BLD AUTO: 3.9 M/UL (ref 3.8–5.2)
RBC # BLD AUTO: 3.98 M/UL (ref 3.8–5.2)
RBC # BLD AUTO: 3.98 M/UL (ref 3.8–5.2)
RBC # BLD AUTO: 3.99 M/UL (ref 3.8–5.2)
RBC # BLD AUTO: 4.01 M/UL (ref 3.8–5.2)
RBC # BLD AUTO: 4.07 M/UL (ref 3.8–5.2)
RBC # BLD AUTO: 4.15 M/UL (ref 3.8–5.2)
RBC # BLD AUTO: 4.16 M/UL (ref 3.8–5.2)
RBC # BLD AUTO: 4.34 M/UL (ref 3.8–5.2)
RBC # BLD AUTO: 4.35 M/UL (ref 3.8–5.2)
RBC # BLD AUTO: 4.49 M/UL (ref 3.8–5.2)
RBC # BLD AUTO: 4.53 M/UL (ref 3.8–5.2)
RBC #/AREA URNS HPF: ABNORMAL /HPF (ref 0–5)
RBC #/AREA URNS HPF: ABNORMAL /HPF (ref 0–5)
SAMPLES BEING HELD,HOLD: NORMAL
SAO2 % BLD: 94 % (ref 92–97)
SAO2 % BLD: 94 % (ref 92–97)
SERVICE CMNT-IMP: ABNORMAL
SERVICE CMNT-IMP: NORMAL
SODIUM SERPL-SCNC: 131 MMOL/L (ref 136–145)
SODIUM SERPL-SCNC: 132 MMOL/L (ref 136–145)
SODIUM SERPL-SCNC: 134 MMOL/L (ref 136–145)
SODIUM SERPL-SCNC: 134 MMOL/L (ref 136–145)
SODIUM SERPL-SCNC: 135 MMOL/L (ref 136–145)
SODIUM SERPL-SCNC: 136 MMOL/L (ref 136–145)
SODIUM SERPL-SCNC: 136 MMOL/L (ref 136–145)
SODIUM SERPL-SCNC: 137 MMOL/L (ref 136–145)
SODIUM SERPL-SCNC: 138 MMOL/L (ref 136–145)
SODIUM SERPL-SCNC: 138 MMOL/L (ref 136–145)
SODIUM SERPL-SCNC: 139 MMOL/L (ref 136–145)
SOURCE, COVRS: NORMAL
SP GR UR REFRACTOMETRY: 1.01 (ref 1–1.03)
SP GR UR REFRACTOMETRY: <1.005
SPECIMEN SOURCE, FCOV2M: NORMAL
SPECIMEN TYPE, XMCV1T: NORMAL
SPECIMEN TYPE: ABNORMAL
SPECIMEN TYPE: ABNORMAL
T4 FREE SERPL-MCNC: 1.3 NG/DL (ref 0.8–1.5)
TRIGL SERPL-MCNC: 73 MG/DL (ref ?–150)
TROPONIN I SERPL-MCNC: <0.05 NG/ML
TSH SERPL DL<=0.05 MIU/L-ACNC: 8.34 UIU/ML (ref 0.36–3.74)
UA: UC IF INDICATED,UAUC: ABNORMAL
UR CULT HOLD, URHOLD: NORMAL
UROBILINOGEN UR QL STRIP.AUTO: 0.2 EU/DL (ref 0.2–1)
UROBILINOGEN UR QL STRIP.AUTO: 1 EU/DL (ref 0.2–1)
VENTRICULAR RATE, ECG03: 102 BPM
VENTRICULAR RATE, ECG03: 103 BPM
VENTRICULAR RATE, ECG03: 106 BPM
VENTRICULAR RATE, ECG03: 114 BPM
VENTRICULAR RATE, ECG03: 94 BPM
VENTRICULAR RATE, ECG03: 98 BPM
VLDLC SERPL CALC-MCNC: 14.6 MG/DL
WBC # BLD AUTO: 10 K/UL (ref 3.6–11)
WBC # BLD AUTO: 10.6 K/UL (ref 3.6–11)
WBC # BLD AUTO: 11.4 K/UL (ref 3.6–11)
WBC # BLD AUTO: 11.6 K/UL (ref 3.6–11)
WBC # BLD AUTO: 12.2 K/UL (ref 3.6–11)
WBC # BLD AUTO: 7.8 K/UL (ref 3.6–11)
WBC # BLD AUTO: 8.6 K/UL (ref 3.6–11)
WBC # BLD AUTO: 8.7 K/UL (ref 3.6–11)
WBC # BLD AUTO: 8.8 K/UL (ref 3.6–11)
WBC # BLD AUTO: 8.9 K/UL (ref 3.6–11)
WBC # BLD AUTO: 9.1 K/UL (ref 3.6–11)
WBC # BLD AUTO: 9.3 K/UL (ref 3.6–11)
WBC # BLD AUTO: 9.4 K/UL (ref 3.6–11)
WBC URNS QL MICRO: >100 /HPF (ref 0–4)
WBC URNS QL MICRO: ABNORMAL /HPF (ref 0–4)

## 2020-01-01 PROCEDURE — 82962 GLUCOSE BLOOD TEST: CPT

## 2020-01-01 PROCEDURE — 70551 MRI BRAIN STEM W/O DYE: CPT

## 2020-01-01 PROCEDURE — 97535 SELF CARE MNGMENT TRAINING: CPT

## 2020-01-01 PROCEDURE — 36415 COLL VENOUS BLD VENIPUNCTURE: CPT

## 2020-01-01 PROCEDURE — 84484 ASSAY OF TROPONIN QUANT: CPT

## 2020-01-01 PROCEDURE — 2709999900 HC NON-CHARGEABLE SUPPLY: Performed by: INTERNAL MEDICINE

## 2020-01-01 PROCEDURE — 97530 THERAPEUTIC ACTIVITIES: CPT

## 2020-01-01 PROCEDURE — 3331090002 HH PPS REVENUE DEBIT

## 2020-01-01 PROCEDURE — 74011250637 HC RX REV CODE- 250/637: Performed by: HOSPITALIST

## 2020-01-01 PROCEDURE — 74230 X-RAY XM SWLNG FUNCJ C+: CPT

## 2020-01-01 PROCEDURE — 80048 BASIC METABOLIC PNL TOTAL CA: CPT

## 2020-01-01 PROCEDURE — 94760 N-INVAS EAR/PLS OXIMETRY 1: CPT

## 2020-01-01 PROCEDURE — 99218 HC RM OBSERVATION: CPT

## 2020-01-01 PROCEDURE — 3331090001 HH PPS REVENUE CREDIT

## 2020-01-01 PROCEDURE — 96376 TX/PRO/DX INJ SAME DRUG ADON: CPT

## 2020-01-01 PROCEDURE — 95816 EEG AWAKE AND DROWSY: CPT | Performed by: HOSPITALIST

## 2020-01-01 PROCEDURE — 80061 LIPID PANEL: CPT

## 2020-01-01 PROCEDURE — HOSPICE MEDICATION HC HH HOSPICE MEDICATION

## 2020-01-01 PROCEDURE — 93005 ELECTROCARDIOGRAM TRACING: CPT

## 2020-01-01 PROCEDURE — 92526 ORAL FUNCTION THERAPY: CPT | Performed by: SPEECH-LANGUAGE PATHOLOGIST

## 2020-01-01 PROCEDURE — 74011250636 HC RX REV CODE- 250/636: Performed by: HOSPITALIST

## 2020-01-01 PROCEDURE — 65270000029 HC RM PRIVATE

## 2020-01-01 PROCEDURE — 97110 THERAPEUTIC EXERCISES: CPT

## 2020-01-01 PROCEDURE — 0651 HSPC ROUTINE HOME CARE

## 2020-01-01 PROCEDURE — 400013 HH SOC

## 2020-01-01 PROCEDURE — 99222 1ST HOSP IP/OBS MODERATE 55: CPT | Performed by: FAMILY MEDICINE

## 2020-01-01 PROCEDURE — 74011250637 HC RX REV CODE- 250/637: Performed by: PSYCHIATRY & NEUROLOGY

## 2020-01-01 PROCEDURE — 74011000258 HC RX REV CODE- 258: Performed by: HOSPITALIST

## 2020-01-01 PROCEDURE — 96374 THER/PROPH/DIAG INJ IV PUSH: CPT

## 2020-01-01 PROCEDURE — 83036 HEMOGLOBIN GLYCOSYLATED A1C: CPT

## 2020-01-01 PROCEDURE — 90686 IIV4 VACC NO PRSV 0.5 ML IM: CPT | Performed by: HOSPITALIST

## 2020-01-01 PROCEDURE — 36600 WITHDRAWAL OF ARTERIAL BLOOD: CPT

## 2020-01-01 PROCEDURE — 81001 URINALYSIS AUTO W/SCOPE: CPT

## 2020-01-01 PROCEDURE — 92610 EVALUATE SWALLOWING FUNCTION: CPT | Performed by: SPEECH-LANGUAGE PATHOLOGIST

## 2020-01-01 PROCEDURE — 80053 COMPREHEN METABOLIC PANEL: CPT

## 2020-01-01 PROCEDURE — 87077 CULTURE AEROBIC IDENTIFY: CPT

## 2020-01-01 PROCEDURE — 97161 PT EVAL LOW COMPLEX 20 MIN: CPT

## 2020-01-01 PROCEDURE — 85025 COMPLETE CBC W/AUTO DIFF WBC: CPT

## 2020-01-01 PROCEDURE — 0DB38ZX EXCISION OF LOWER ESOPHAGUS, VIA NATURAL OR ARTIFICIAL OPENING ENDOSCOPIC, DIAGNOSTIC: ICD-10-PCS | Performed by: INTERNAL MEDICINE

## 2020-01-01 PROCEDURE — 85610 PROTHROMBIN TIME: CPT

## 2020-01-01 PROCEDURE — 74011000258 HC RX REV CODE- 258: Performed by: INTERNAL MEDICINE

## 2020-01-01 PROCEDURE — 99285 EMERGENCY DEPT VISIT HI MDM: CPT

## 2020-01-01 PROCEDURE — 94762 N-INVAS EAR/PLS OXIMTRY CONT: CPT

## 2020-01-01 PROCEDURE — 74011000250 HC RX REV CODE- 250: Performed by: HOSPITALIST

## 2020-01-01 PROCEDURE — 74011250636 HC RX REV CODE- 250/636: Performed by: INTERNAL MEDICINE

## 2020-01-01 PROCEDURE — 3331090003 HH PPS REVENUE ADJ

## 2020-01-01 PROCEDURE — 77030021593 HC FCPS BIOP ENDOSC BSC -A: Performed by: INTERNAL MEDICINE

## 2020-01-01 PROCEDURE — 76040000019: Performed by: INTERNAL MEDICINE

## 2020-01-01 PROCEDURE — 71045 X-RAY EXAM CHEST 1 VIEW: CPT

## 2020-01-01 PROCEDURE — 74011000258 HC RX REV CODE- 258: Performed by: RADIOLOGY

## 2020-01-01 PROCEDURE — 93306 TTE W/DOPPLER COMPLETE: CPT

## 2020-01-01 PROCEDURE — 99233 SBSQ HOSP IP/OBS HIGH 50: CPT | Performed by: PSYCHIATRY & NEUROLOGY

## 2020-01-01 PROCEDURE — G0151 HHCP-SERV OF PT,EA 15 MIN: HCPCS

## 2020-01-01 PROCEDURE — 74011250637 HC RX REV CODE- 250/637: Performed by: NURSE PRACTITIONER

## 2020-01-01 PROCEDURE — G0299 HHS/HOSPICE OF RN EA 15 MIN: HCPCS

## 2020-01-01 PROCEDURE — 70450 CT HEAD/BRAIN W/O DYE: CPT

## 2020-01-01 PROCEDURE — 85027 COMPLETE CBC AUTOMATED: CPT

## 2020-01-01 PROCEDURE — 70496 CT ANGIOGRAPHY HEAD: CPT

## 2020-01-01 PROCEDURE — 84100 ASSAY OF PHOSPHORUS: CPT

## 2020-01-01 PROCEDURE — 83880 ASSAY OF NATRIURETIC PEPTIDE: CPT

## 2020-01-01 PROCEDURE — 74011250636 HC RX REV CODE- 250/636: Performed by: FAMILY MEDICINE

## 2020-01-01 PROCEDURE — 74011250637 HC RX REV CODE- 250/637: Performed by: INTERNAL MEDICINE

## 2020-01-01 PROCEDURE — 77010033678 HC OXYGEN DAILY

## 2020-01-01 PROCEDURE — 88305 TISSUE EXAM BY PATHOLOGIST: CPT

## 2020-01-01 PROCEDURE — 3331090004 HSPC SERVICE INTENSITY ADD-ON

## 2020-01-01 PROCEDURE — 0042T CT CODE NEURO PERF W CBF: CPT

## 2020-01-01 PROCEDURE — 87086 URINE CULTURE/COLONY COUNT: CPT

## 2020-01-01 PROCEDURE — 87635 SARS-COV-2 COVID-19 AMP PRB: CPT

## 2020-01-01 PROCEDURE — 99223 1ST HOSP IP/OBS HIGH 75: CPT | Performed by: NURSE PRACTITIONER

## 2020-01-01 PROCEDURE — 0656 HSPC GENERAL INPATIENT

## 2020-01-01 PROCEDURE — 84439 ASSAY OF FREE THYROXINE: CPT

## 2020-01-01 PROCEDURE — 96375 TX/PRO/DX INJ NEW DRUG ADDON: CPT

## 2020-01-01 PROCEDURE — 71046 X-RAY EXAM CHEST 2 VIEWS: CPT

## 2020-01-01 PROCEDURE — 74011250636 HC RX REV CODE- 250/636: Performed by: NURSE ANESTHETIST, CERTIFIED REGISTERED

## 2020-01-01 PROCEDURE — 83735 ASSAY OF MAGNESIUM: CPT

## 2020-01-01 PROCEDURE — 99283 EMERGENCY DEPT VISIT LOW MDM: CPT

## 2020-01-01 PROCEDURE — 74011250636 HC RX REV CODE- 250/636: Performed by: EMERGENCY MEDICINE

## 2020-01-01 PROCEDURE — 74011250636 HC RX REV CODE- 250/636: Performed by: NURSE PRACTITIONER

## 2020-01-01 PROCEDURE — 82803 BLOOD GASES ANY COMBINATION: CPT

## 2020-01-01 PROCEDURE — 74011000636 HC RX REV CODE- 636: Performed by: RADIOLOGY

## 2020-01-01 PROCEDURE — 76060000031 HC ANESTHESIA FIRST 0.5 HR: Performed by: INTERNAL MEDICINE

## 2020-01-01 PROCEDURE — 92526 ORAL FUNCTION THERAPY: CPT

## 2020-01-01 PROCEDURE — 3336500001 HSPC ELECTION

## 2020-01-01 PROCEDURE — 90471 IMMUNIZATION ADMIN: CPT

## 2020-01-01 PROCEDURE — 74240 X-RAY XM UPR GI TRC 1CNTRST: CPT

## 2020-01-01 PROCEDURE — 73020 X-RAY EXAM OF SHOULDER: CPT

## 2020-01-01 PROCEDURE — 99233 SBSQ HOSP IP/OBS HIGH 50: CPT | Performed by: INTERNAL MEDICINE

## 2020-01-01 PROCEDURE — 84443 ASSAY THYROID STIM HORMONE: CPT

## 2020-01-01 PROCEDURE — 99223 1ST HOSP IP/OBS HIGH 75: CPT | Performed by: INTERNAL MEDICINE

## 2020-01-01 PROCEDURE — 97166 OT EVAL MOD COMPLEX 45 MIN: CPT

## 2020-01-01 PROCEDURE — 87186 SC STD MICRODIL/AGAR DIL: CPT

## 2020-01-01 PROCEDURE — 92611 MOTION FLUOROSCOPY/SWALLOW: CPT | Performed by: SPEECH-LANGUAGE PATHOLOGIST

## 2020-01-01 RX ORDER — POLYETHYLENE GLYCOL 3350 17 G/17G
17 POWDER, FOR SOLUTION ORAL DAILY
Status: DISCONTINUED | OUTPATIENT
Start: 2020-01-01 | End: 2020-01-01 | Stop reason: HOSPADM

## 2020-01-01 RX ORDER — PROMETHAZINE HYDROCHLORIDE 25 MG/1
25 TABLET ORAL
Status: DISPENSED | OUTPATIENT
Start: 2020-01-01 | End: 2020-01-01

## 2020-01-01 RX ORDER — FUROSEMIDE 10 MG/ML
40 INJECTION INTRAMUSCULAR; INTRAVENOUS ONCE
Status: COMPLETED | OUTPATIENT
Start: 2020-01-01 | End: 2020-01-01

## 2020-01-01 RX ORDER — FLUMAZENIL 0.1 MG/ML
0.2 INJECTION INTRAVENOUS
Status: DISCONTINUED | OUTPATIENT
Start: 2020-01-01 | End: 2020-01-01 | Stop reason: HOSPADM

## 2020-01-01 RX ORDER — ACETAMINOPHEN 325 MG/1
650 TABLET ORAL
Status: DISCONTINUED | OUTPATIENT
Start: 2020-01-01 | End: 2020-01-01 | Stop reason: SDUPTHER

## 2020-01-01 RX ORDER — NALOXONE HYDROCHLORIDE 0.4 MG/ML
0.4 INJECTION, SOLUTION INTRAMUSCULAR; INTRAVENOUS; SUBCUTANEOUS
Status: DISCONTINUED | OUTPATIENT
Start: 2020-01-01 | End: 2020-01-01 | Stop reason: HOSPADM

## 2020-01-01 RX ORDER — POTASSIUM CHLORIDE 7.45 MG/ML
10 INJECTION INTRAVENOUS
Status: COMPLETED | OUTPATIENT
Start: 2020-01-01 | End: 2020-01-01

## 2020-01-01 RX ORDER — POTASSIUM CHLORIDE 750 MG/1
20 TABLET, FILM COATED, EXTENDED RELEASE ORAL
Status: COMPLETED | OUTPATIENT
Start: 2020-01-01 | End: 2020-01-01

## 2020-01-01 RX ORDER — MORPHINE SULFATE 2 MG/ML
1 INJECTION, SOLUTION INTRAMUSCULAR; INTRAVENOUS
Status: DISCONTINUED | OUTPATIENT
Start: 2020-01-01 | End: 2020-01-01 | Stop reason: HOSPADM

## 2020-01-01 RX ORDER — SODIUM CHLORIDE 0.9 % (FLUSH) 0.9 %
10 SYRINGE (ML) INJECTION
Status: COMPLETED | OUTPATIENT
Start: 2020-01-01 | End: 2020-01-01

## 2020-01-01 RX ORDER — METOPROLOL TARTRATE 5 MG/5ML
2.5 INJECTION INTRAVENOUS EVERY 6 HOURS
Status: DISCONTINUED | OUTPATIENT
Start: 2020-01-01 | End: 2020-01-01

## 2020-01-01 RX ORDER — MICONAZOLE NITRATE 2 %
POWDER (GRAM) TOPICAL EVERY 12 HOURS
Status: DISCONTINUED | OUTPATIENT
Start: 2020-01-01 | End: 2020-01-01 | Stop reason: HOSPADM

## 2020-01-01 RX ORDER — ONDANSETRON 2 MG/ML
4 INJECTION INTRAMUSCULAR; INTRAVENOUS
Status: DISCONTINUED | OUTPATIENT
Start: 2020-01-01 | End: 2020-01-01

## 2020-01-01 RX ORDER — AMIODARONE HYDROCHLORIDE 200 MG/1
200 TABLET ORAL DAILY
Status: DISCONTINUED | OUTPATIENT
Start: 2020-01-01 | End: 2020-01-01

## 2020-01-01 RX ORDER — HALOPERIDOL 5 MG/ML
1 INJECTION INTRAMUSCULAR
Status: DISCONTINUED | OUTPATIENT
Start: 2020-01-01 | End: 2020-01-01 | Stop reason: HOSPADM

## 2020-01-01 RX ORDER — DOCUSATE SODIUM 100 MG/1
100 CAPSULE, LIQUID FILLED ORAL
Status: DISCONTINUED | OUTPATIENT
Start: 2020-01-01 | End: 2020-01-01 | Stop reason: HOSPADM

## 2020-01-01 RX ORDER — ACETAMINOPHEN 325 MG/1
650 TABLET ORAL
Status: DISCONTINUED | OUTPATIENT
Start: 2020-01-01 | End: 2020-01-01 | Stop reason: HOSPADM

## 2020-01-01 RX ORDER — ACETAMINOPHEN 500 MG
500 TABLET ORAL EVERY 8 HOURS
Status: DISCONTINUED | OUTPATIENT
Start: 2020-01-01 | End: 2020-01-01

## 2020-01-01 RX ORDER — ONDANSETRON 2 MG/ML
INJECTION INTRAMUSCULAR; INTRAVENOUS AS NEEDED
Status: DISCONTINUED | OUTPATIENT
Start: 2020-01-01 | End: 2020-01-01 | Stop reason: HOSPADM

## 2020-01-01 RX ORDER — LISINOPRIL 5 MG/1
2.5 TABLET ORAL DAILY
Status: DISCONTINUED | OUTPATIENT
Start: 2020-01-01 | End: 2020-01-01

## 2020-01-01 RX ORDER — SODIUM CHLORIDE 0.9 % (FLUSH) 0.9 %
5-40 SYRINGE (ML) INJECTION AS NEEDED
Status: DISCONTINUED | OUTPATIENT
Start: 2020-01-01 | End: 2020-01-01 | Stop reason: HOSPADM

## 2020-01-01 RX ORDER — MIRTAZAPINE 15 MG/1
7.5 TABLET, FILM COATED ORAL
Status: DISCONTINUED | OUTPATIENT
Start: 2020-01-01 | End: 2020-01-01 | Stop reason: SDUPTHER

## 2020-01-01 RX ORDER — FENTANYL CITRATE 50 UG/ML
25-200 INJECTION, SOLUTION INTRAMUSCULAR; INTRAVENOUS
Status: DISCONTINUED | OUTPATIENT
Start: 2020-01-01 | End: 2020-01-01 | Stop reason: HOSPADM

## 2020-01-01 RX ORDER — SODIUM CHLORIDE 0.9 % (FLUSH) 0.9 %
5-40 SYRINGE (ML) INJECTION EVERY 8 HOURS
Status: DISCONTINUED | OUTPATIENT
Start: 2020-01-01 | End: 2020-01-01 | Stop reason: HOSPADM

## 2020-01-01 RX ORDER — SODIUM CHLORIDE 9 MG/ML
100 INJECTION, SOLUTION INTRAVENOUS CONTINUOUS
Status: DISPENSED | OUTPATIENT
Start: 2020-01-01 | End: 2020-01-01

## 2020-01-01 RX ORDER — ACETAMINOPHEN 650 MG/1
650 SUPPOSITORY RECTAL
Status: DISCONTINUED | OUTPATIENT
Start: 2020-01-01 | End: 2020-01-01

## 2020-01-01 RX ORDER — FACIAL-BODY WIPES
10 EACH TOPICAL DAILY PRN
Status: DISCONTINUED | OUTPATIENT
Start: 2020-01-01 | End: 2020-01-01 | Stop reason: HOSPADM

## 2020-01-01 RX ORDER — PRAVASTATIN SODIUM 20 MG/1
10 TABLET ORAL
Status: DISCONTINUED | OUTPATIENT
Start: 2020-01-01 | End: 2020-01-01 | Stop reason: HOSPADM

## 2020-01-01 RX ORDER — ACETAMINOPHEN 650 MG/1
650 SUPPOSITORY RECTAL
Status: DISCONTINUED | OUTPATIENT
Start: 2020-01-01 | End: 2020-01-01 | Stop reason: SDUPTHER

## 2020-01-01 RX ORDER — LABETALOL HYDROCHLORIDE 5 MG/ML
5 INJECTION, SOLUTION INTRAVENOUS
Status: DISCONTINUED | OUTPATIENT
Start: 2020-01-01 | End: 2020-01-01 | Stop reason: SDUPTHER

## 2020-01-01 RX ORDER — METOPROLOL SUCCINATE 50 MG/1
50 TABLET, EXTENDED RELEASE ORAL DAILY
Status: DISCONTINUED | OUTPATIENT
Start: 2020-01-01 | End: 2020-01-01 | Stop reason: HOSPADM

## 2020-01-01 RX ORDER — NYSTATIN 100000 [USP'U]/ML
500000 SUSPENSION ORAL 4 TIMES DAILY
Status: DISCONTINUED | OUTPATIENT
Start: 2020-01-01 | End: 2020-01-01 | Stop reason: HOSPADM

## 2020-01-01 RX ORDER — ESCITALOPRAM OXALATE 10 MG/1
5 TABLET ORAL DAILY
Status: DISCONTINUED | OUTPATIENT
Start: 2020-01-01 | End: 2020-01-01 | Stop reason: HOSPADM

## 2020-01-01 RX ORDER — METOPROLOL TARTRATE 50 MG/1
50 TABLET ORAL EVERY EVENING
Status: DISCONTINUED | OUTPATIENT
Start: 2020-01-01 | End: 2020-01-01

## 2020-01-01 RX ORDER — LATANOPROST 50 UG/ML
1 SOLUTION/ DROPS OPHTHALMIC
Status: DISCONTINUED | OUTPATIENT
Start: 2020-01-01 | End: 2020-01-01 | Stop reason: HOSPADM

## 2020-01-01 RX ORDER — ALBUTEROL SULFATE 0.83 MG/ML
2.5 SOLUTION RESPIRATORY (INHALATION)
Status: DISCONTINUED | OUTPATIENT
Start: 2020-01-01 | End: 2020-01-01 | Stop reason: HOSPADM

## 2020-01-01 RX ORDER — HYDROMORPHONE HYDROCHLORIDE 1 MG/ML
0.2 INJECTION, SOLUTION INTRAMUSCULAR; INTRAVENOUS; SUBCUTANEOUS
Status: DISCONTINUED | OUTPATIENT
Start: 2020-01-01 | End: 2020-01-01 | Stop reason: HOSPADM

## 2020-01-01 RX ORDER — SODIUM CHLORIDE 0.9 % (FLUSH) 0.9 %
10 SYRINGE (ML) INJECTION
Status: DISPENSED | OUTPATIENT
Start: 2020-01-01 | End: 2020-01-01

## 2020-01-01 RX ORDER — FUROSEMIDE 10 MG/ML
20 INJECTION INTRAMUSCULAR; INTRAVENOUS DAILY
Status: DISCONTINUED | OUTPATIENT
Start: 2020-01-01 | End: 2020-01-01

## 2020-01-01 RX ORDER — LANOLIN ALCOHOL/MO/W.PET/CERES
3 CREAM (GRAM) TOPICAL
Status: DISCONTINUED | OUTPATIENT
Start: 2020-01-01 | End: 2020-01-01 | Stop reason: HOSPADM

## 2020-01-01 RX ORDER — SODIUM CHLORIDE 9 MG/ML
50 INJECTION, SOLUTION INTRAVENOUS CONTINUOUS
Status: DISPENSED | OUTPATIENT
Start: 2020-01-01 | End: 2020-01-01

## 2020-01-01 RX ORDER — NYSTATIN 100000 [USP'U]/ML
500000 SUSPENSION ORAL 4 TIMES DAILY
Status: DISCONTINUED | OUTPATIENT
Start: 2020-01-01 | End: 2020-01-01

## 2020-01-01 RX ORDER — SODIUM CHLORIDE 9 MG/ML
75 INJECTION, SOLUTION INTRAVENOUS CONTINUOUS
Status: DISCONTINUED | OUTPATIENT
Start: 2020-01-01 | End: 2020-01-01

## 2020-01-01 RX ORDER — TIMOLOL MALEATE 5 MG/ML
1 SOLUTION/ DROPS OPHTHALMIC 2 TIMES DAILY
Status: DISCONTINUED | OUTPATIENT
Start: 2020-01-01 | End: 2020-01-01 | Stop reason: HOSPADM

## 2020-01-01 RX ORDER — SODIUM CHLORIDE 9 MG/ML
INJECTION, SOLUTION INTRAVENOUS
Status: DISCONTINUED | OUTPATIENT
Start: 2020-01-01 | End: 2020-01-01 | Stop reason: HOSPADM

## 2020-01-01 RX ORDER — LORAZEPAM 0.5 MG/1
0.5 TABLET ORAL ONCE
Status: DISPENSED | OUTPATIENT
Start: 2020-01-01 | End: 2020-01-01

## 2020-01-01 RX ORDER — LEVOFLOXACIN 500 MG/1
500 TABLET, FILM COATED ORAL EVERY 24 HOURS
Status: DISCONTINUED | OUTPATIENT
Start: 2020-01-01 | End: 2020-01-01 | Stop reason: ALTCHOICE

## 2020-01-01 RX ORDER — ATROPINE SULFATE 0.1 MG/ML
0.5 INJECTION INTRAVENOUS
Status: DISCONTINUED | OUTPATIENT
Start: 2020-01-01 | End: 2020-01-01 | Stop reason: HOSPADM

## 2020-01-01 RX ORDER — MIRTAZAPINE 15 MG/1
7.5 TABLET, FILM COATED ORAL
Status: DISCONTINUED | OUTPATIENT
Start: 2020-01-01 | End: 2020-01-01 | Stop reason: HOSPADM

## 2020-01-01 RX ORDER — FUROSEMIDE 10 MG/ML
40 INJECTION INTRAMUSCULAR; INTRAVENOUS DAILY
Status: DISCONTINUED | OUTPATIENT
Start: 2020-01-01 | End: 2020-01-01 | Stop reason: HOSPADM

## 2020-01-01 RX ORDER — ONDANSETRON 2 MG/ML
4 INJECTION INTRAMUSCULAR; INTRAVENOUS
Status: COMPLETED | OUTPATIENT
Start: 2020-01-01 | End: 2020-01-01

## 2020-01-01 RX ORDER — MIRTAZAPINE 15 MG/1
15 TABLET, FILM COATED ORAL
Status: DISCONTINUED | OUTPATIENT
Start: 2020-01-01 | End: 2020-01-01

## 2020-01-01 RX ORDER — HYOSCYAMINE SULFATE 0.12 MG/1
0.12 TABLET SUBLINGUAL
Status: DISCONTINUED | OUTPATIENT
Start: 2020-01-01 | End: 2020-01-01 | Stop reason: HOSPADM

## 2020-01-01 RX ORDER — GUAIFENESIN 100 MG/5ML
81 LIQUID (ML) ORAL DAILY
Status: DISCONTINUED | OUTPATIENT
Start: 2020-01-01 | End: 2020-01-01 | Stop reason: HOSPADM

## 2020-01-01 RX ORDER — THERA TABS 400 MCG
1 TAB ORAL DAILY
Status: DISCONTINUED | OUTPATIENT
Start: 2020-01-01 | End: 2020-01-01 | Stop reason: HOSPADM

## 2020-01-01 RX ORDER — METOPROLOL SUCCINATE 50 MG/1
100 TABLET, EXTENDED RELEASE ORAL DAILY
Status: DISCONTINUED | OUTPATIENT
Start: 2020-01-01 | End: 2020-01-01

## 2020-01-01 RX ORDER — MIDAZOLAM HYDROCHLORIDE 1 MG/ML
.25-5 INJECTION, SOLUTION INTRAMUSCULAR; INTRAVENOUS
Status: DISCONTINUED | OUTPATIENT
Start: 2020-01-01 | End: 2020-01-01 | Stop reason: HOSPADM

## 2020-01-01 RX ORDER — DEXTROSE MONOHYDRATE AND SODIUM CHLORIDE 5; .45 G/100ML; G/100ML
35 INJECTION, SOLUTION INTRAVENOUS CONTINUOUS
Status: DISCONTINUED | OUTPATIENT
Start: 2020-01-01 | End: 2020-01-01

## 2020-01-01 RX ORDER — ONDANSETRON 2 MG/ML
4 INJECTION INTRAMUSCULAR; INTRAVENOUS
Status: DISCONTINUED | OUTPATIENT
Start: 2020-01-01 | End: 2020-01-01 | Stop reason: HOSPADM

## 2020-01-01 RX ORDER — ACETAMINOPHEN 650 MG/1
650 SUPPOSITORY RECTAL
Status: DISCONTINUED | OUTPATIENT
Start: 2020-01-01 | End: 2020-01-01 | Stop reason: HOSPADM

## 2020-01-01 RX ORDER — DEXTROMETHORPHAN/PSEUDOEPHED 2.5-7.5/.8
1.2 DROPS ORAL
Status: DISCONTINUED | OUTPATIENT
Start: 2020-01-01 | End: 2020-01-01 | Stop reason: HOSPADM

## 2020-01-01 RX ORDER — EPINEPHRINE 0.1 MG/ML
1 INJECTION INTRACARDIAC; INTRAVENOUS
Status: DISCONTINUED | OUTPATIENT
Start: 2020-01-01 | End: 2020-01-01 | Stop reason: HOSPADM

## 2020-01-01 RX ORDER — SUCCINYLCHOLINE CHLORIDE 20 MG/ML
INJECTION INTRAMUSCULAR; INTRAVENOUS AS NEEDED
Status: DISCONTINUED | OUTPATIENT
Start: 2020-01-01 | End: 2020-01-01 | Stop reason: HOSPADM

## 2020-01-01 RX ORDER — SODIUM CHLORIDE, SODIUM LACTATE, POTASSIUM CHLORIDE, CALCIUM CHLORIDE 600; 310; 30; 20 MG/100ML; MG/100ML; MG/100ML; MG/100ML
40 INJECTION, SOLUTION INTRAVENOUS CONTINUOUS
Status: DISCONTINUED | OUTPATIENT
Start: 2020-01-01 | End: 2020-01-01

## 2020-01-01 RX ORDER — LABETALOL HYDROCHLORIDE 5 MG/ML
5 INJECTION, SOLUTION INTRAVENOUS
Status: DISCONTINUED | OUTPATIENT
Start: 2020-01-01 | End: 2020-01-01 | Stop reason: HOSPADM

## 2020-01-01 RX ADMIN — NYSTATIN 500000 UNITS: 100000 SUSPENSION ORAL at 09:35

## 2020-01-01 RX ADMIN — NYSTATIN 500000 UNITS: 100000 SUSPENSION ORAL at 13:49

## 2020-01-01 RX ADMIN — MICONAZOLE NITRATE 2 % TOPICAL POWDER: at 21:35

## 2020-01-01 RX ADMIN — DOCUSATE SODIUM 100 MG: 100 CAPSULE, LIQUID FILLED ORAL at 08:44

## 2020-01-01 RX ADMIN — PIPERACILLIN AND TAZOBACTAM 3.38 G: 3; .375 INJECTION, POWDER, LYOPHILIZED, FOR SOLUTION INTRAVENOUS at 04:05

## 2020-01-01 RX ADMIN — NYSTATIN 500000 UNITS: 100000 SUSPENSION ORAL at 14:35

## 2020-01-01 RX ADMIN — TIMOLOL MALEATE 1 DROP: 5 SOLUTION/ DROPS OPHTHALMIC at 17:03

## 2020-01-01 RX ADMIN — THERA TABS 1 TABLET: TAB at 08:44

## 2020-01-01 RX ADMIN — ONDANSETRON 4 MG: 2 INJECTION INTRAMUSCULAR; INTRAVENOUS at 21:58

## 2020-01-01 RX ADMIN — APIXABAN 2.5 MG: 2.5 TABLET, FILM COATED ORAL at 18:57

## 2020-01-01 RX ADMIN — ONDANSETRON 4 MG: 2 INJECTION INTRAMUSCULAR; INTRAVENOUS at 12:15

## 2020-01-01 RX ADMIN — NYSTATIN 500000 UNITS: 100000 SUSPENSION ORAL at 17:06

## 2020-01-01 RX ADMIN — NYSTATIN 500000 UNITS: 100000 SUSPENSION ORAL at 08:43

## 2020-01-01 RX ADMIN — THERA TABS 1 TABLET: TAB at 09:00

## 2020-01-01 RX ADMIN — NYSTATIN 500000 UNITS: 100000 SUSPENSION ORAL at 21:34

## 2020-01-01 RX ADMIN — METOPROLOL SUCCINATE 50 MG: 50 TABLET, EXTENDED RELEASE ORAL at 13:32

## 2020-01-01 RX ADMIN — NYSTATIN 500000 UNITS: 100000 SUSPENSION ORAL at 08:11

## 2020-01-01 RX ADMIN — METOPROLOL TARTRATE 50 MG: 50 TABLET, FILM COATED ORAL at 17:03

## 2020-01-01 RX ADMIN — METOPROLOL TARTRATE 50 MG: 50 TABLET, FILM COATED ORAL at 23:01

## 2020-01-01 RX ADMIN — Medication 10 ML: at 09:14

## 2020-01-01 RX ADMIN — POTASSIUM CHLORIDE 10 MEQ: 7.46 INJECTION, SOLUTION INTRAVENOUS at 08:00

## 2020-01-01 RX ADMIN — Medication 1 CAPSULE: at 08:43

## 2020-01-01 RX ADMIN — AMIODARONE HYDROCHLORIDE 200 MG: 200 TABLET ORAL at 12:47

## 2020-01-01 RX ADMIN — POLYETHYLENE GLYCOL 3350 17 G: 17 POWDER, FOR SOLUTION ORAL at 08:11

## 2020-01-01 RX ADMIN — SODIUM CHLORIDE 100 ML/HR: 900 INJECTION, SOLUTION INTRAVENOUS at 14:03

## 2020-01-01 RX ADMIN — NYSTATIN 500000 UNITS: 100000 SUSPENSION ORAL at 09:56

## 2020-01-01 RX ADMIN — METOPROLOL TARTRATE 50 MG: 50 TABLET, FILM COATED ORAL at 17:30

## 2020-01-01 RX ADMIN — PIPERACILLIN AND TAZOBACTAM 3.38 G: 3; .375 INJECTION, POWDER, LYOPHILIZED, FOR SOLUTION INTRAVENOUS at 11:56

## 2020-01-01 RX ADMIN — PIPERACILLIN AND TAZOBACTAM 3.38 G: 3; .375 INJECTION, POWDER, LYOPHILIZED, FOR SOLUTION INTRAVENOUS at 19:09

## 2020-01-01 RX ADMIN — ESCITALOPRAM OXALATE 5 MG: 10 TABLET ORAL at 09:53

## 2020-01-01 RX ADMIN — THERA TABS 1 TABLET: TAB at 09:56

## 2020-01-01 RX ADMIN — TIMOLOL MALEATE 1 DROP: 5 SOLUTION/ DROPS OPHTHALMIC at 12:22

## 2020-01-01 RX ADMIN — POTASSIUM CHLORIDE 20 MEQ: 750 TABLET, FILM COATED, EXTENDED RELEASE ORAL at 19:18

## 2020-01-01 RX ADMIN — PRAVASTATIN SODIUM 10 MG: 20 TABLET ORAL at 22:01

## 2020-01-01 RX ADMIN — AMIODARONE HYDROCHLORIDE 200 MG: 200 TABLET ORAL at 08:43

## 2020-01-01 RX ADMIN — TIMOLOL MALEATE 1 DROP: 5 SOLUTION/ DROPS OPHTHALMIC at 17:42

## 2020-01-01 RX ADMIN — ASPIRIN 81 MG CHEWABLE TABLET 81 MG: 81 TABLET CHEWABLE at 08:43

## 2020-01-01 RX ADMIN — TIMOLOL MALEATE 1 DROP: 5 SOLUTION/ DROPS OPHTHALMIC at 10:45

## 2020-01-01 RX ADMIN — LATANOPROST 1 DROP: 50 SOLUTION OPHTHALMIC at 22:27

## 2020-01-01 RX ADMIN — LATANOPROST 1 DROP: 50 SOLUTION OPHTHALMIC at 21:51

## 2020-01-01 RX ADMIN — CEFTRIAXONE SODIUM 1 G: 1 INJECTION, POWDER, FOR SOLUTION INTRAMUSCULAR; INTRAVENOUS at 07:17

## 2020-01-01 RX ADMIN — PRAVASTATIN SODIUM 10 MG: 20 TABLET ORAL at 21:24

## 2020-01-01 RX ADMIN — THERA TABS 1 TABLET: TAB at 09:46

## 2020-01-01 RX ADMIN — TIMOLOL MALEATE 1 DROP: 5 SOLUTION/ DROPS OPHTHALMIC at 17:52

## 2020-01-01 RX ADMIN — TIMOLOL MALEATE 1 DROP: 5 SOLUTION/ DROPS OPHTHALMIC at 09:32

## 2020-01-01 RX ADMIN — Medication 10 ML: at 17:40

## 2020-01-01 RX ADMIN — DOCUSATE SODIUM 100 MG: 100 CAPSULE, LIQUID FILLED ORAL at 18:49

## 2020-01-01 RX ADMIN — SUCCINYLCHOLINE CHLORIDE 30 MG: 20 INJECTION, SOLUTION INTRAMUSCULAR; INTRAVENOUS; PARENTERAL at 11:29

## 2020-01-01 RX ADMIN — NYSTATIN 500000 UNITS: 100000 SUSPENSION ORAL at 23:03

## 2020-01-01 RX ADMIN — MICONAZOLE NITRATE 2 % TOPICAL POWDER: at 08:54

## 2020-01-01 RX ADMIN — Medication 3 MG: at 02:57

## 2020-01-01 RX ADMIN — LATANOPROST 1 DROP: 50 SOLUTION OPHTHALMIC at 21:35

## 2020-01-01 RX ADMIN — TIMOLOL MALEATE 1 DROP: 5 SOLUTION/ DROPS OPHTHALMIC at 17:31

## 2020-01-01 RX ADMIN — Medication 10 ML: at 05:31

## 2020-01-01 RX ADMIN — NYSTATIN 500000 UNITS: 100000 SUSPENSION ORAL at 12:29

## 2020-01-01 RX ADMIN — DEXTROSE MONOHYDRATE AND SODIUM CHLORIDE 50 ML/HR: 5; .45 INJECTION, SOLUTION INTRAVENOUS at 17:38

## 2020-01-01 RX ADMIN — NYSTATIN 500000 UNITS: 100000 SUSPENSION ORAL at 14:02

## 2020-01-01 RX ADMIN — Medication 3 MG: at 00:20

## 2020-01-01 RX ADMIN — MIRTAZAPINE 7.5 MG: 15 TABLET, FILM COATED ORAL at 22:56

## 2020-01-01 RX ADMIN — PIPERACILLIN AND TAZOBACTAM 3.38 G: 3; .375 INJECTION, POWDER, LYOPHILIZED, FOR SOLUTION INTRAVENOUS at 12:43

## 2020-01-01 RX ADMIN — APIXABAN 2.5 MG: 2.5 TABLET, FILM COATED ORAL at 18:13

## 2020-01-01 RX ADMIN — ALBUTEROL SULFATE 2.5 MG: 2.5 SOLUTION RESPIRATORY (INHALATION) at 18:30

## 2020-01-01 RX ADMIN — Medication 10 ML: at 06:31

## 2020-01-01 RX ADMIN — Medication 10 ML: at 13:31

## 2020-01-01 RX ADMIN — AMIODARONE HYDROCHLORIDE 200 MG: 200 TABLET ORAL at 09:35

## 2020-01-01 RX ADMIN — MICONAZOLE NITRATE 2 % TOPICAL POWDER: at 17:06

## 2020-01-01 RX ADMIN — TIMOLOL MALEATE 1 DROP: 5 SOLUTION/ DROPS OPHTHALMIC at 17:46

## 2020-01-01 RX ADMIN — TIMOLOL MALEATE 1 DROP: 5 SOLUTION/ DROPS OPHTHALMIC at 17:33

## 2020-01-01 RX ADMIN — NYSTATIN 500000 UNITS: 100000 SUSPENSION ORAL at 12:47

## 2020-01-01 RX ADMIN — Medication 10 ML: at 23:11

## 2020-01-01 RX ADMIN — SODIUM CHLORIDE 100 ML: 900 INJECTION, SOLUTION INTRAVENOUS at 09:14

## 2020-01-01 RX ADMIN — PRAVASTATIN SODIUM 10 MG: 20 TABLET ORAL at 22:17

## 2020-01-01 RX ADMIN — PRAVASTATIN SODIUM 10 MG: 20 TABLET ORAL at 21:07

## 2020-01-01 RX ADMIN — THERA TABS 1 TABLET: TAB at 09:35

## 2020-01-01 RX ADMIN — TIMOLOL MALEATE 1 DROP: 5 SOLUTION/ DROPS OPHTHALMIC at 17:41

## 2020-01-01 RX ADMIN — ASPIRIN 81 MG CHEWABLE TABLET 81 MG: 81 TABLET CHEWABLE at 09:56

## 2020-01-01 RX ADMIN — DOCUSATE SODIUM 100 MG: 100 CAPSULE, LIQUID FILLED ORAL at 17:07

## 2020-01-01 RX ADMIN — LATANOPROST 1 DROP: 50 SOLUTION OPHTHALMIC at 21:54

## 2020-01-01 RX ADMIN — METOPROLOL TARTRATE 50 MG: 50 TABLET, FILM COATED ORAL at 18:57

## 2020-01-01 RX ADMIN — ESCITALOPRAM OXALATE 5 MG: 10 TABLET ORAL at 08:59

## 2020-01-01 RX ADMIN — ACETAMINOPHEN 650 MG: 325 TABLET ORAL at 23:06

## 2020-01-01 RX ADMIN — SODIUM CHLORIDE 75 ML/HR: 900 INJECTION, SOLUTION INTRAVENOUS at 05:34

## 2020-01-01 RX ADMIN — PIPERACILLIN AND TAZOBACTAM 3.38 G: 3; .375 INJECTION, POWDER, LYOPHILIZED, FOR SOLUTION INTRAVENOUS at 03:41

## 2020-01-01 RX ADMIN — POTASSIUM CHLORIDE 10 MEQ: 7.46 INJECTION, SOLUTION INTRAVENOUS at 06:41

## 2020-01-01 RX ADMIN — APIXABAN 2.5 MG: 2.5 TABLET, FILM COATED ORAL at 08:44

## 2020-01-01 RX ADMIN — Medication 1 CAPSULE: at 09:00

## 2020-01-01 RX ADMIN — Medication 1 CAPSULE: at 09:35

## 2020-01-01 RX ADMIN — APIXABAN 2.5 MG: 2.5 TABLET, FILM COATED ORAL at 17:46

## 2020-01-01 RX ADMIN — HALOPERIDOL LACTATE 1 MG: 5 INJECTION, SOLUTION INTRAMUSCULAR at 06:16

## 2020-01-01 RX ADMIN — Medication 1 CAPSULE: at 09:56

## 2020-01-01 RX ADMIN — AMIODARONE HYDROCHLORIDE 200 MG: 200 TABLET ORAL at 09:56

## 2020-01-01 RX ADMIN — IOPAMIDOL 40 ML: 755 INJECTION, SOLUTION INTRAVENOUS at 09:14

## 2020-01-01 RX ADMIN — ONDANSETRON 4 MG: 2 INJECTION INTRAMUSCULAR; INTRAVENOUS at 18:45

## 2020-01-01 RX ADMIN — PRAVASTATIN SODIUM 10 MG: 20 TABLET ORAL at 21:57

## 2020-01-01 RX ADMIN — TIMOLOL MALEATE 1 DROP: 5 SOLUTION/ DROPS OPHTHALMIC at 18:09

## 2020-01-01 RX ADMIN — Medication 10 ML: at 21:36

## 2020-01-01 RX ADMIN — NYSTATIN 500000 UNITS: 100000 SUSPENSION ORAL at 11:40

## 2020-01-01 RX ADMIN — ASPIRIN 81 MG CHEWABLE TABLET 81 MG: 81 TABLET CHEWABLE at 09:52

## 2020-01-01 RX ADMIN — ASPIRIN 81 MG CHEWABLE TABLET 81 MG: 81 TABLET CHEWABLE at 12:47

## 2020-01-01 RX ADMIN — NYSTATIN 500000 UNITS: 100000 SUSPENSION ORAL at 21:13

## 2020-01-01 RX ADMIN — LATANOPROST 1 DROP: 50 SOLUTION OPHTHALMIC at 22:00

## 2020-01-01 RX ADMIN — MICONAZOLE NITRATE 2 % TOPICAL POWDER: at 09:57

## 2020-01-01 RX ADMIN — NYSTATIN 500000 UNITS: 100000 SUSPENSION ORAL at 17:03

## 2020-01-01 RX ADMIN — TIMOLOL MALEATE 1 DROP: 5 SOLUTION/ DROPS OPHTHALMIC at 10:50

## 2020-01-01 RX ADMIN — Medication 1 CAPSULE: at 16:11

## 2020-01-01 RX ADMIN — TIMOLOL MALEATE 1 DROP: 5 SOLUTION/ DROPS OPHTHALMIC at 08:44

## 2020-01-01 RX ADMIN — POTASSIUM BICARBONATE 20 MEQ: 782 TABLET, EFFERVESCENT ORAL at 17:36

## 2020-01-01 RX ADMIN — POLYETHYLENE GLYCOL 3350 17 G: 17 POWDER, FOR SOLUTION ORAL at 09:52

## 2020-01-01 RX ADMIN — POLYETHYLENE GLYCOL 3350 17 G: 17 POWDER, FOR SOLUTION ORAL at 09:54

## 2020-01-01 RX ADMIN — NYSTATIN 500000 UNITS: 100000 SUSPENSION ORAL at 12:58

## 2020-01-01 RX ADMIN — NYSTATIN 500000 UNITS: 100000 SUSPENSION ORAL at 22:36

## 2020-01-01 RX ADMIN — APIXABAN 2.5 MG: 2.5 TABLET, FILM COATED ORAL at 17:03

## 2020-01-01 RX ADMIN — Medication 10 ML: at 21:23

## 2020-01-01 RX ADMIN — PRAVASTATIN SODIUM 10 MG: 20 TABLET ORAL at 23:04

## 2020-01-01 RX ADMIN — NYSTATIN 500000 UNITS: 100000 SUSPENSION ORAL at 22:56

## 2020-01-01 RX ADMIN — MICONAZOLE NITRATE 2 % TOPICAL POWDER: at 21:10

## 2020-01-01 RX ADMIN — AMIODARONE HYDROCHLORIDE 200 MG: 200 TABLET ORAL at 08:44

## 2020-01-01 RX ADMIN — THERA TABS 1 TABLET: TAB at 08:45

## 2020-01-01 RX ADMIN — LATANOPROST 1 DROP: 50 SOLUTION OPHTHALMIC at 21:22

## 2020-01-01 RX ADMIN — LATANOPROST 1 DROP: 50 SOLUTION OPHTHALMIC at 21:10

## 2020-01-01 RX ADMIN — MORPHINE SULFATE 1 MG: 2 INJECTION, SOLUTION INTRAMUSCULAR; INTRAVENOUS at 14:42

## 2020-01-01 RX ADMIN — SODIUM CHLORIDE 100 ML/HR: 9 INJECTION, SOLUTION INTRAVENOUS at 23:03

## 2020-01-01 RX ADMIN — CEFTRIAXONE SODIUM 1 G: 1 INJECTION, POWDER, FOR SOLUTION INTRAMUSCULAR; INTRAVENOUS at 07:20

## 2020-01-01 RX ADMIN — SODIUM CHLORIDE 100 ML/HR: 900 INJECTION, SOLUTION INTRAVENOUS at 04:58

## 2020-01-01 RX ADMIN — INFLUENZA VIRUS VACCINE 0.5 ML: 15; 15; 15; 15 SUSPENSION INTRAMUSCULAR at 14:02

## 2020-01-01 RX ADMIN — DOCUSATE SODIUM 100 MG: 100 CAPSULE, LIQUID FILLED ORAL at 08:11

## 2020-01-01 RX ADMIN — APIXABAN 2.5 MG: 2.5 TABLET, FILM COATED ORAL at 09:03

## 2020-01-01 RX ADMIN — PIPERACILLIN AND TAZOBACTAM 3.38 G: 3; .375 INJECTION, POWDER, LYOPHILIZED, FOR SOLUTION INTRAVENOUS at 19:01

## 2020-01-01 RX ADMIN — SODIUM CHLORIDE, SODIUM LACTATE, POTASSIUM CHLORIDE, AND CALCIUM CHLORIDE 75 ML/HR: 600; 310; 30; 20 INJECTION, SOLUTION INTRAVENOUS at 07:35

## 2020-01-01 RX ADMIN — Medication 1 CAPSULE: at 09:47

## 2020-01-01 RX ADMIN — APIXABAN 2.5 MG: 2.5 TABLET, FILM COATED ORAL at 09:53

## 2020-01-01 RX ADMIN — AMIODARONE HYDROCHLORIDE 200 MG: 200 TABLET ORAL at 09:52

## 2020-01-01 RX ADMIN — APIXABAN 2.5 MG: 2.5 TABLET, FILM COATED ORAL at 17:39

## 2020-01-01 RX ADMIN — Medication 1 CAPSULE: at 09:52

## 2020-01-01 RX ADMIN — MICONAZOLE NITRATE 2 % TOPICAL POWDER: at 22:00

## 2020-01-01 RX ADMIN — MORPHINE SULFATE 1 MG: 2 INJECTION, SOLUTION INTRAMUSCULAR; INTRAVENOUS at 14:12

## 2020-01-01 RX ADMIN — METOPROLOL TARTRATE 50 MG: 50 TABLET, FILM COATED ORAL at 17:06

## 2020-01-01 RX ADMIN — PRAVASTATIN SODIUM 10 MG: 20 TABLET ORAL at 23:01

## 2020-01-01 RX ADMIN — METOPROLOL TARTRATE 50 MG: 50 TABLET, FILM COATED ORAL at 17:36

## 2020-01-01 RX ADMIN — ASPIRIN 81 MG CHEWABLE TABLET 81 MG: 81 TABLET CHEWABLE at 08:44

## 2020-01-01 RX ADMIN — PRAVASTATIN SODIUM 10 MG: 20 TABLET ORAL at 21:16

## 2020-01-01 RX ADMIN — ASPIRIN 81 MG CHEWABLE TABLET 81 MG: 81 TABLET CHEWABLE at 09:47

## 2020-01-01 RX ADMIN — LATANOPROST 1 DROP: 50 SOLUTION OPHTHALMIC at 21:08

## 2020-01-01 RX ADMIN — Medication 10 ML: at 22:38

## 2020-01-01 RX ADMIN — TIMOLOL MALEATE 1 DROP: 5 SOLUTION/ DROPS OPHTHALMIC at 08:45

## 2020-01-01 RX ADMIN — MICONAZOLE NITRATE 2 % TOPICAL POWDER: at 21:22

## 2020-01-01 RX ADMIN — ONDANSETRON 4 MG: 2 INJECTION INTRAMUSCULAR; INTRAVENOUS at 09:39

## 2020-01-01 RX ADMIN — Medication 10 ML: at 13:06

## 2020-01-01 RX ADMIN — APIXABAN 2.5 MG: 2.5 TABLET, FILM COATED ORAL at 17:43

## 2020-01-01 RX ADMIN — ASPIRIN 81 MG CHEWABLE TABLET 81 MG: 81 TABLET CHEWABLE at 08:11

## 2020-01-01 RX ADMIN — MICONAZOLE NITRATE 2 % TOPICAL POWDER: at 09:00

## 2020-01-01 RX ADMIN — APIXABAN 2.5 MG: 2.5 TABLET, FILM COATED ORAL at 12:41

## 2020-01-01 RX ADMIN — THERA TABS 1 TABLET: TAB at 08:59

## 2020-01-01 RX ADMIN — NYSTATIN 500000 UNITS: 100000 SUSPENSION ORAL at 17:01

## 2020-01-01 RX ADMIN — MICONAZOLE NITRATE 2 % TOPICAL POWDER: at 21:56

## 2020-01-01 RX ADMIN — TIMOLOL MALEATE 1 DROP: 5 SOLUTION/ DROPS OPHTHALMIC at 09:57

## 2020-01-01 RX ADMIN — Medication 10 ML: at 05:37

## 2020-01-01 RX ADMIN — APIXABAN 2.5 MG: 2.5 TABLET, FILM COATED ORAL at 17:45

## 2020-01-01 RX ADMIN — NYSTATIN 500000 UNITS: 100000 SUSPENSION ORAL at 17:32

## 2020-01-01 RX ADMIN — TIMOLOL MALEATE 1 DROP: 5 SOLUTION/ DROPS OPHTHALMIC at 18:01

## 2020-01-01 RX ADMIN — PIPERACILLIN AND TAZOBACTAM 3.38 G: 3; .375 INJECTION, POWDER, LYOPHILIZED, FOR SOLUTION INTRAVENOUS at 11:41

## 2020-01-01 RX ADMIN — DEXTROSE MONOHYDRATE AND SODIUM CHLORIDE 50 ML/HR: 5; .45 INJECTION, SOLUTION INTRAVENOUS at 14:58

## 2020-01-01 RX ADMIN — NYSTATIN 500000 UNITS: 100000 SUSPENSION ORAL at 17:35

## 2020-01-01 RX ADMIN — NYSTATIN 500000 UNITS: 100000 SUSPENSION ORAL at 21:59

## 2020-01-01 RX ADMIN — Medication 10 ML: at 13:49

## 2020-01-01 RX ADMIN — IOPAMIDOL 120 ML: 755 INJECTION, SOLUTION INTRAVENOUS at 18:37

## 2020-01-01 RX ADMIN — TIMOLOL MALEATE 1 DROP: 5 SOLUTION/ DROPS OPHTHALMIC at 08:12

## 2020-01-01 RX ADMIN — PIPERACILLIN AND TAZOBACTAM 3.38 G: 3; .375 INJECTION, POWDER, LYOPHILIZED, FOR SOLUTION INTRAVENOUS at 19:00

## 2020-01-01 RX ADMIN — SODIUM CHLORIDE, SODIUM LACTATE, POTASSIUM CHLORIDE, AND CALCIUM CHLORIDE 75 ML/HR: 600; 310; 30; 20 INJECTION, SOLUTION INTRAVENOUS at 22:56

## 2020-01-01 RX ADMIN — LATANOPROST 1 DROP: 50 SOLUTION OPHTHALMIC at 22:38

## 2020-01-01 RX ADMIN — NYSTATIN 500000 UNITS: 100000 SUSPENSION ORAL at 17:43

## 2020-01-01 RX ADMIN — PIPERACILLIN AND TAZOBACTAM 3.38 G: 3; .375 INJECTION, POWDER, LYOPHILIZED, FOR SOLUTION INTRAVENOUS at 19:13

## 2020-01-01 RX ADMIN — ACETAMINOPHEN 500 MG: 500 TABLET ORAL at 21:07

## 2020-01-01 RX ADMIN — THERA TABS 1 TABLET: TAB at 08:43

## 2020-01-01 RX ADMIN — TIMOLOL MALEATE 1 DROP: 5 SOLUTION/ DROPS OPHTHALMIC at 10:00

## 2020-01-01 RX ADMIN — NYSTATIN 500000 UNITS: 100000 SUSPENSION ORAL at 17:39

## 2020-01-01 RX ADMIN — NYSTATIN 500000 UNITS: 100000 SUSPENSION ORAL at 12:43

## 2020-01-01 RX ADMIN — FUROSEMIDE 40 MG: 10 INJECTION, SOLUTION INTRAMUSCULAR; INTRAVENOUS at 20:28

## 2020-01-01 RX ADMIN — MICONAZOLE NITRATE 2 % TOPICAL POWDER: at 12:53

## 2020-01-01 RX ADMIN — APIXABAN 2.5 MG: 2.5 TABLET, FILM COATED ORAL at 09:32

## 2020-01-01 RX ADMIN — SODIUM CHLORIDE 100 ML/HR: 900 INJECTION, SOLUTION INTRAVENOUS at 18:24

## 2020-01-01 RX ADMIN — POTASSIUM BICARBONATE 20 MEQ: 782 TABLET, EFFERVESCENT ORAL at 09:52

## 2020-01-01 RX ADMIN — METOPROLOL TARTRATE 50 MG: 50 TABLET, FILM COATED ORAL at 18:13

## 2020-01-01 RX ADMIN — LATANOPROST 1 DROP: 50 SOLUTION OPHTHALMIC at 22:17

## 2020-01-01 RX ADMIN — TIMOLOL MALEATE 1 DROP: 5 SOLUTION/ DROPS OPHTHALMIC at 08:48

## 2020-01-01 RX ADMIN — ONDANSETRON 4 MG: 2 INJECTION INTRAMUSCULAR; INTRAVENOUS at 14:33

## 2020-01-01 RX ADMIN — METOPROLOL TARTRATE 50 MG: 50 TABLET, FILM COATED ORAL at 17:43

## 2020-01-01 RX ADMIN — POLYETHYLENE GLYCOL 3350 17 G: 17 POWDER, FOR SOLUTION ORAL at 08:45

## 2020-01-01 RX ADMIN — SODIUM CHLORIDE, SODIUM LACTATE, POTASSIUM CHLORIDE, AND CALCIUM CHLORIDE 75 ML/HR: 600; 310; 30; 20 INJECTION, SOLUTION INTRAVENOUS at 14:30

## 2020-01-01 RX ADMIN — PRAVASTATIN SODIUM 10 MG: 20 TABLET ORAL at 21:13

## 2020-01-01 RX ADMIN — THERA TABS 1 TABLET: TAB at 09:32

## 2020-01-01 RX ADMIN — ESCITALOPRAM OXALATE 5 MG: 10 TABLET ORAL at 08:42

## 2020-01-01 RX ADMIN — PIPERACILLIN AND TAZOBACTAM 3.38 G: 3; .375 INJECTION, POWDER, LYOPHILIZED, FOR SOLUTION INTRAVENOUS at 11:21

## 2020-01-01 RX ADMIN — APIXABAN 2.5 MG: 2.5 TABLET, FILM COATED ORAL at 17:36

## 2020-01-01 RX ADMIN — PIPERACILLIN AND TAZOBACTAM 3.38 G: 3; .375 INJECTION, POWDER, LYOPHILIZED, FOR SOLUTION INTRAVENOUS at 12:23

## 2020-01-01 RX ADMIN — POLYETHYLENE GLYCOL 3350 17 G: 17 POWDER, FOR SOLUTION ORAL at 08:44

## 2020-01-01 RX ADMIN — LATANOPROST 1 DROP: 50 SOLUTION OPHTHALMIC at 21:16

## 2020-01-01 RX ADMIN — NYSTATIN 500000 UNITS: 100000 SUSPENSION ORAL at 21:16

## 2020-01-01 RX ADMIN — ESCITALOPRAM OXALATE 5 MG: 10 TABLET ORAL at 14:58

## 2020-01-01 RX ADMIN — METOPROLOL TARTRATE 50 MG: 50 TABLET, FILM COATED ORAL at 17:49

## 2020-01-01 RX ADMIN — ACETAMINOPHEN 500 MG: 500 TABLET ORAL at 04:42

## 2020-01-01 RX ADMIN — PIPERACILLIN AND TAZOBACTAM 3.38 G: 3; .375 INJECTION, POWDER, LYOPHILIZED, FOR SOLUTION INTRAVENOUS at 03:48

## 2020-01-01 RX ADMIN — TIMOLOL MALEATE 1 DROP: 5 SOLUTION/ DROPS OPHTHALMIC at 17:20

## 2020-01-01 RX ADMIN — MICONAZOLE NITRATE 2 % TOPICAL POWDER: at 21:12

## 2020-01-01 RX ADMIN — APIXABAN 2.5 MG: 2.5 TABLET, FILM COATED ORAL at 08:11

## 2020-01-01 RX ADMIN — APIXABAN 2.5 MG: 2.5 TABLET, FILM COATED ORAL at 09:56

## 2020-01-01 RX ADMIN — TIMOLOL MALEATE 1 DROP: 5 SOLUTION/ DROPS OPHTHALMIC at 17:08

## 2020-01-01 RX ADMIN — LATANOPROST 1 DROP: 50 SOLUTION OPHTHALMIC at 21:31

## 2020-01-01 RX ADMIN — METOPROLOL TARTRATE 50 MG: 50 TABLET, FILM COATED ORAL at 17:46

## 2020-01-01 RX ADMIN — APIXABAN 2.5 MG: 2.5 TABLET, FILM COATED ORAL at 08:43

## 2020-01-01 RX ADMIN — MICONAZOLE NITRATE 2 % TOPICAL POWDER: at 09:50

## 2020-01-01 RX ADMIN — ASPIRIN 81 MG CHEWABLE TABLET 81 MG: 81 TABLET CHEWABLE at 09:00

## 2020-01-01 RX ADMIN — MICONAZOLE NITRATE 2 % TOPICAL POWDER: at 23:09

## 2020-01-01 RX ADMIN — DEXTROSE MONOHYDRATE AND SODIUM CHLORIDE 35 ML/HR: 5; .45 INJECTION, SOLUTION INTRAVENOUS at 19:06

## 2020-01-01 RX ADMIN — TIMOLOL MALEATE 1 DROP: 5 SOLUTION/ DROPS OPHTHALMIC at 17:43

## 2020-01-01 RX ADMIN — POTASSIUM BICARBONATE 20 MEQ: 782 TABLET, EFFERVESCENT ORAL at 17:49

## 2020-01-01 RX ADMIN — POLYETHYLENE GLYCOL 3350 17 G: 17 POWDER, FOR SOLUTION ORAL at 09:35

## 2020-01-01 RX ADMIN — NYSTATIN 500000 UNITS: 100000 SUSPENSION ORAL at 13:32

## 2020-01-01 RX ADMIN — PRAVASTATIN SODIUM 10 MG: 20 TABLET ORAL at 21:51

## 2020-01-01 RX ADMIN — TIMOLOL MALEATE 1 DROP: 5 SOLUTION/ DROPS OPHTHALMIC at 09:59

## 2020-01-01 RX ADMIN — NYSTATIN 500000 UNITS: 100000 SUSPENSION ORAL at 09:53

## 2020-01-01 RX ADMIN — THERA TABS 1 TABLET: TAB at 09:52

## 2020-01-01 RX ADMIN — FUROSEMIDE 40 MG: 10 INJECTION, SOLUTION INTRAMUSCULAR; INTRAVENOUS at 20:05

## 2020-01-01 RX ADMIN — AMIODARONE HYDROCHLORIDE 200 MG: 200 TABLET ORAL at 09:32

## 2020-01-01 RX ADMIN — ONDANSETRON 4 MG: 2 INJECTION INTRAMUSCULAR; INTRAVENOUS at 09:36

## 2020-01-01 RX ADMIN — ACETAMINOPHEN 500 MG: 500 TABLET ORAL at 05:00

## 2020-01-01 RX ADMIN — TIMOLOL MALEATE 1 DROP: 5 SOLUTION/ DROPS OPHTHALMIC at 18:27

## 2020-01-01 RX ADMIN — CEFTRIAXONE SODIUM 1 G: 1 INJECTION, POWDER, FOR SOLUTION INTRAMUSCULAR; INTRAVENOUS at 08:43

## 2020-01-01 RX ADMIN — Medication 10 ML: at 14:12

## 2020-01-01 RX ADMIN — APIXABAN 2.5 MG: 2.5 TABLET, FILM COATED ORAL at 23:01

## 2020-01-01 RX ADMIN — Medication 10 ML: at 14:03

## 2020-01-01 RX ADMIN — TIMOLOL MALEATE 1 DROP: 5 SOLUTION/ DROPS OPHTHALMIC at 09:39

## 2020-01-01 RX ADMIN — METOPROLOL TARTRATE 50 MG: 50 TABLET, FILM COATED ORAL at 17:40

## 2020-01-01 RX ADMIN — POTASSIUM CHLORIDE 10 MEQ: 7.46 INJECTION, SOLUTION INTRAVENOUS at 09:00

## 2020-01-01 RX ADMIN — NYSTATIN 500000 UNITS: 100000 SUSPENSION ORAL at 17:30

## 2020-01-01 RX ADMIN — ONDANSETRON 4 MG: 2 INJECTION INTRAMUSCULAR; INTRAVENOUS at 17:30

## 2020-01-01 RX ADMIN — DEXTROSE MONOHYDRATE AND SODIUM CHLORIDE 50 ML/HR: 5; .45 INJECTION, SOLUTION INTRAVENOUS at 17:34

## 2020-01-01 RX ADMIN — PRAVASTATIN SODIUM 10 MG: 20 TABLET ORAL at 21:34

## 2020-01-01 RX ADMIN — NYSTATIN 500000 UNITS: 100000 SUSPENSION ORAL at 21:22

## 2020-01-01 RX ADMIN — TIMOLOL MALEATE 1 DROP: 5 SOLUTION/ DROPS OPHTHALMIC at 09:50

## 2020-01-01 RX ADMIN — CEFTRIAXONE SODIUM 1 G: 1 INJECTION, POWDER, FOR SOLUTION INTRAMUSCULAR; INTRAVENOUS at 09:01

## 2020-01-01 RX ADMIN — POTASSIUM CHLORIDE 20 MEQ: 750 TABLET, FILM COATED, EXTENDED RELEASE ORAL at 18:54

## 2020-01-01 RX ADMIN — LATANOPROST 1 DROP: 50 SOLUTION OPHTHALMIC at 23:08

## 2020-01-01 RX ADMIN — POTASSIUM BICARBONATE 20 MEQ: 782 TABLET, EFFERVESCENT ORAL at 09:54

## 2020-01-01 RX ADMIN — Medication 10 ML: at 21:19

## 2020-01-01 RX ADMIN — APIXABAN 2.5 MG: 2.5 TABLET, FILM COATED ORAL at 08:45

## 2020-01-01 RX ADMIN — MICONAZOLE NITRATE 2 % TOPICAL POWDER: at 08:44

## 2020-01-01 RX ADMIN — AMIODARONE HYDROCHLORIDE 200 MG: 200 TABLET ORAL at 09:03

## 2020-01-01 RX ADMIN — POLYETHYLENE GLYCOL 3350 17 G: 17 POWDER, FOR SOLUTION ORAL at 08:43

## 2020-01-01 RX ADMIN — SODIUM CHLORIDE, SODIUM LACTATE, POTASSIUM CHLORIDE, AND CALCIUM CHLORIDE 75 ML/HR: 600; 310; 30; 20 INJECTION, SOLUTION INTRAVENOUS at 03:07

## 2020-01-01 RX ADMIN — Medication 1 CAPSULE: at 12:47

## 2020-01-01 RX ADMIN — Medication 10 ML: at 06:01

## 2020-01-01 RX ADMIN — NYSTATIN 500000 UNITS: 100000 SUSPENSION ORAL at 17:37

## 2020-01-01 RX ADMIN — APIXABAN 2.5 MG: 2.5 TABLET, FILM COATED ORAL at 09:58

## 2020-01-01 RX ADMIN — THERA TABS 1 TABLET: TAB at 09:53

## 2020-01-01 RX ADMIN — AMIODARONE HYDROCHLORIDE 200 MG: 200 TABLET ORAL at 08:11

## 2020-01-01 RX ADMIN — POLYETHYLENE GLYCOL 3350 17 G: 17 POWDER, FOR SOLUTION ORAL at 09:31

## 2020-01-01 RX ADMIN — ACETAMINOPHEN 500 MG: 500 TABLET ORAL at 21:24

## 2020-01-01 RX ADMIN — AMIODARONE HYDROCHLORIDE 200 MG: 200 TABLET ORAL at 09:58

## 2020-01-01 RX ADMIN — SODIUM CHLORIDE 75 ML/HR: 900 INJECTION, SOLUTION INTRAVENOUS at 14:22

## 2020-01-01 RX ADMIN — AMIODARONE HYDROCHLORIDE 200 MG: 200 TABLET ORAL at 09:47

## 2020-01-01 RX ADMIN — TIMOLOL MALEATE 1 DROP: 5 SOLUTION/ DROPS OPHTHALMIC at 17:14

## 2020-01-01 RX ADMIN — TIMOLOL MALEATE 1 DROP: 5 SOLUTION/ DROPS OPHTHALMIC at 09:00

## 2020-01-01 RX ADMIN — PIPERACILLIN AND TAZOBACTAM 3.38 G: 3; .375 INJECTION, POWDER, LYOPHILIZED, FOR SOLUTION INTRAVENOUS at 11:47

## 2020-01-01 RX ADMIN — METOPROLOL TARTRATE 50 MG: 50 TABLET, FILM COATED ORAL at 17:39

## 2020-01-01 RX ADMIN — Medication 10 ML: at 06:08

## 2020-01-01 RX ADMIN — ONDANSETRON HYDROCHLORIDE 4 MG: 2 INJECTION, SOLUTION INTRAMUSCULAR; INTRAVENOUS at 11:25

## 2020-01-01 RX ADMIN — POTASSIUM CHLORIDE 10 MEQ: 7.46 INJECTION, SOLUTION INTRAVENOUS at 09:39

## 2020-01-01 RX ADMIN — Medication 10 ML: at 21:13

## 2020-01-01 RX ADMIN — Medication 10 ML: at 14:28

## 2020-01-01 RX ADMIN — SODIUM CHLORIDE, SODIUM LACTATE, POTASSIUM CHLORIDE, AND CALCIUM CHLORIDE 75 ML/HR: 600; 310; 30; 20 INJECTION, SOLUTION INTRAVENOUS at 17:14

## 2020-01-01 RX ADMIN — AMIODARONE HYDROCHLORIDE 200 MG: 200 TABLET ORAL at 12:41

## 2020-01-01 RX ADMIN — MIRTAZAPINE 7.5 MG: 15 TABLET, FILM COATED ORAL at 21:13

## 2020-01-01 RX ADMIN — NYSTATIN 500000 UNITS: 100000 SUSPENSION ORAL at 09:52

## 2020-01-01 RX ADMIN — PRAVASTATIN SODIUM 10 MG: 20 TABLET ORAL at 22:56

## 2020-01-01 RX ADMIN — METOPROLOL TARTRATE 50 MG: 50 TABLET, FILM COATED ORAL at 17:01

## 2020-01-01 RX ADMIN — LATANOPROST 1 DROP: 50 SOLUTION OPHTHALMIC at 21:13

## 2020-01-01 RX ADMIN — PIPERACILLIN AND TAZOBACTAM 3.38 G: 3; .375 INJECTION, POWDER, LYOPHILIZED, FOR SOLUTION INTRAVENOUS at 04:11

## 2020-01-01 RX ADMIN — APIXABAN 2.5 MG: 2.5 TABLET, FILM COATED ORAL at 09:00

## 2020-01-01 RX ADMIN — ASPIRIN 81 MG CHEWABLE TABLET 81 MG: 81 TABLET CHEWABLE at 08:45

## 2020-01-01 RX ADMIN — PRAVASTATIN SODIUM 10 MG: 20 TABLET ORAL at 22:37

## 2020-01-01 RX ADMIN — TIMOLOL MALEATE 1 DROP: 5 SOLUTION/ DROPS OPHTHALMIC at 13:49

## 2020-01-01 RX ADMIN — ASPIRIN 81 MG CHEWABLE TABLET 81 MG: 81 TABLET CHEWABLE at 09:32

## 2020-01-01 RX ADMIN — LATANOPROST 1 DROP: 50 SOLUTION OPHTHALMIC at 23:15

## 2020-01-01 RX ADMIN — NYSTATIN 500000 UNITS: 100000 SUSPENSION ORAL at 17:36

## 2020-01-01 RX ADMIN — APIXABAN 2.5 MG: 2.5 TABLET, FILM COATED ORAL at 17:30

## 2020-01-01 RX ADMIN — SODIUM CHLORIDE, SODIUM LACTATE, POTASSIUM CHLORIDE, AND CALCIUM CHLORIDE 40 ML/HR: 600; 310; 30; 20 INJECTION, SOLUTION INTRAVENOUS at 11:51

## 2020-01-01 RX ADMIN — MIRTAZAPINE 15 MG: 15 TABLET, FILM COATED ORAL at 21:51

## 2020-01-01 RX ADMIN — THERA TABS 1 TABLET: TAB at 08:11

## 2020-01-01 RX ADMIN — SODIUM CHLORIDE, SODIUM LACTATE, POTASSIUM CHLORIDE, AND CALCIUM CHLORIDE 75 ML/HR: 600; 310; 30; 20 INJECTION, SOLUTION INTRAVENOUS at 23:11

## 2020-01-01 RX ADMIN — Medication 10 ML: at 22:03

## 2020-01-01 RX ADMIN — SODIUM CHLORIDE, SODIUM LACTATE, POTASSIUM CHLORIDE, AND CALCIUM CHLORIDE 75 ML/HR: 600; 310; 30; 20 INJECTION, SOLUTION INTRAVENOUS at 18:57

## 2020-01-01 RX ADMIN — NYSTATIN 500000 UNITS: 100000 SUSPENSION ORAL at 09:00

## 2020-01-01 RX ADMIN — AMIODARONE HYDROCHLORIDE 200 MG: 200 TABLET ORAL at 09:00

## 2020-01-01 RX ADMIN — NYSTATIN 500000 UNITS: 100000 SUSPENSION ORAL at 14:26

## 2020-01-01 RX ADMIN — POTASSIUM CHLORIDE 20 MEQ: 750 TABLET, FILM COATED, EXTENDED RELEASE ORAL at 16:11

## 2020-01-01 RX ADMIN — APIXABAN 2.5 MG: 2.5 TABLET, FILM COATED ORAL at 17:01

## 2020-01-01 RX ADMIN — NYSTATIN 500000 UNITS: 100000 SUSPENSION ORAL at 09:47

## 2020-01-01 RX ADMIN — ASPIRIN 81 MG CHEWABLE TABLET 81 MG: 81 TABLET CHEWABLE at 09:03

## 2020-01-01 RX ADMIN — LATANOPROST 1 DROP: 50 SOLUTION OPHTHALMIC at 18:30

## 2020-01-01 RX ADMIN — APIXABAN 2.5 MG: 2.5 TABLET, FILM COATED ORAL at 17:37

## 2020-01-01 RX ADMIN — TIMOLOL MALEATE 1 DROP: 5 SOLUTION/ DROPS OPHTHALMIC at 12:53

## 2020-01-01 RX ADMIN — PIPERACILLIN AND TAZOBACTAM 3.38 G: 3; .375 INJECTION, POWDER, LYOPHILIZED, FOR SOLUTION INTRAVENOUS at 04:04

## 2020-01-01 RX ADMIN — POLYETHYLENE GLYCOL 3350 17 G: 17 POWDER, FOR SOLUTION ORAL at 09:00

## 2020-01-01 RX ADMIN — SODIUM CHLORIDE: 900 INJECTION, SOLUTION INTRAVENOUS at 11:17

## 2020-01-01 RX ADMIN — MICONAZOLE NITRATE 2 % TOPICAL POWDER: at 13:50

## 2020-01-01 RX ADMIN — PRAVASTATIN SODIUM 10 MG: 20 TABLET ORAL at 21:10

## 2020-01-01 RX ADMIN — METOPROLOL TARTRATE 50 MG: 50 TABLET, FILM COATED ORAL at 17:32

## 2020-01-01 RX ADMIN — IOPAMIDOL 80 ML: 755 INJECTION, SOLUTION INTRAVENOUS at 09:13

## 2020-01-01 RX ADMIN — AMIODARONE HYDROCHLORIDE 200 MG: 200 TABLET ORAL at 08:45

## 2020-01-01 RX ADMIN — MICONAZOLE NITRATE 2 % TOPICAL POWDER: at 22:33

## 2020-01-01 RX ADMIN — THERA TABS 1 TABLET: TAB at 12:47

## 2020-01-01 RX ADMIN — LATANOPROST 1 DROP: 50 SOLUTION OPHTHALMIC at 23:01

## 2020-09-24 NOTE — DISCHARGE INSTRUCTIONS
Patient Education        Nausea and Vomiting: Care Instructions  Your Care Instructions     When you are nauseated, you may feel weak and sweaty and notice a lot of saliva in your mouth. Nausea often leads to vomiting. Most of the time you do not need to worry about nausea and vomiting, but they can be signs of other illnesses. Two common causes of nausea and vomiting are stomach flu and food poisoning. Nausea and vomiting from viral stomach flu will usually start to improve within 24 hours. Nausea and vomiting from food poisoning may last from 12 to 48 hours. The doctor has checked you carefully, but problems can develop later. If you notice any problems or new symptoms, get medical treatment right away. Follow-up care is a key part of your treatment and safety. Be sure to make and go to all appointments, and call your doctor if you are having problems. It's also a good idea to know your test results and keep a list of the medicines you take. How can you care for yourself at home? · To prevent dehydration, drink plenty of fluids, enough so that your urine is light yellow or clear like water. Choose water and other caffeine-free clear liquids until you feel better. If you have kidney, heart, or liver disease and have to limit fluids, talk with your doctor before you increase the amount of fluids you drink. · Rest in bed until you feel better. · When you are able to eat, try clear soups, mild foods, and liquids until all symptoms are gone for 12 to 48 hours. Other good choices include dry toast, crackers, cooked cereal, and gelatin dessert, such as Jell-O. When should you call for help? Call 911 anytime you think you may need emergency care. For example, call if:    · You passed out (lost consciousness). Call your doctor now or seek immediate medical care if:    · You have symptoms of dehydration, such as:  ? Dry eyes and a dry mouth. ? Passing only a little dark urine. ?  Feeling thirstier than usual.   · You have new or worsening belly pain.     · You have a new or higher fever.     · You vomit blood or what looks like coffee grounds. Watch closely for changes in your health, and be sure to contact your doctor if:    · You have ongoing nausea and vomiting.     · Your vomiting is getting worse.     · Your vomiting lasts longer than 2 days.     · You are not getting better as expected. Where can you learn more? Go to http://oni-cruz.info/  Enter H591 in the search box to learn more about \"Nausea and Vomiting: Care Instructions. \"  Current as of: June 26, 2019               Content Version: 12.6  © 8226-5630 [employer]. Care instructions adapted under license by [employer] (which disclaims liability or warranty for this information). If you have questions about a medical condition or this instruction, always ask your healthcare professional. [address] any warranty or liability for your use of this information.

## 2020-09-24 NOTE — ED PROVIDER NOTES
HPI  
80-year-old female history of CAD, atrial fibrillation, presents to the emergency department from primary care physician's office, Dr. Jaden Little, for nausea, vomiting, dehydration and atrial fibrillation. Patient presents to ED today with son, history obtained from patient and son as patient states she is hard of hearing. As per son patient recently had surgical repair of humerus fracture approximately 3 weeks prior, after which he has noted that patient has had a decreased appetite decreased p.o. intake, and occasionally complains of nausea with minimal vomiting which patient and son described as \"spit up\". Today patient went to primary care physician's office for evaluation was given Zofran, with improvement of symptoms however at office patient was noted to be in rapid A. fib 1 15-1 20, and was sent to emergency department for dehydration management. Patient denies any current nausea,  complains of generalized weakness, denies any chest pain, no palpitations no shortness of breath, no abdominal pain. Patient denies any recent fevers chills no pain or burning on urination. Past Medical History:  
Diagnosis Date  CAD (coronary artery disease)  Gastrointestinal disorder History reviewed. No pertinent surgical history. History reviewed. No pertinent family history. Social History Socioeconomic History  Marital status:  Spouse name: Not on file  Number of children: Not on file  Years of education: Not on file  Highest education level: Not on file Occupational History  Not on file Social Needs  Financial resource strain: Not on file  Food insecurity Worry: Not on file Inability: Not on file  Transportation needs Medical: Not on file Non-medical: Not on file Tobacco Use  Smoking status: Never Smoker  Smokeless tobacco: Never Used Substance and Sexual Activity  Alcohol use: Not on file  Drug use: Not on file  Sexual activity: Not on file Lifestyle  Physical activity Days per week: Not on file Minutes per session: Not on file  Stress: Not on file Relationships  Social connections Talks on phone: Not on file Gets together: Not on file Attends Synagogue service: Not on file Active member of club or organization: Not on file Attends meetings of clubs or organizations: Not on file Relationship status: Not on file  Intimate partner violence Fear of current or ex partner: Not on file Emotionally abused: Not on file Physically abused: Not on file Forced sexual activity: Not on file Other Topics Concern  Not on file Social History Narrative  Not on file ALLERGIES: Ambien [zolpidem] and Fosamax [alendronate] Review of Systems Constitutional: Positive for activity change, appetite change and fatigue. Negative for chills and fever. HENT: Negative for congestion. Eyes: Negative for photophobia and visual disturbance. Respiratory: Negative for apnea, chest tightness and shortness of breath. Cardiovascular: Negative for chest pain, palpitations and leg swelling. Gastrointestinal: Positive for nausea and vomiting. Negative for abdominal distention and abdominal pain. Genitourinary: Negative for dysuria and flank pain. Musculoskeletal: Negative for arthralgias, back pain and gait problem. Neurological: Positive for weakness. Negative for dizziness, tremors, syncope, light-headedness, numbness and headaches. Vitals:  
 09/24/20 1427 BP: 116/76 Pulse: (!) 117 Resp: 18 Temp: 97.8 °F (36.6 °C) SpO2: 96% Weight: 56.7 kg (125 lb) Height: 5' 2\" (1.575 m) Physical Exam 
Nursing note reviewed. Exam conducted with a chaperone present. Constitutional:   
   General: She is not in acute distress. Appearance: Normal appearance. She is not ill-appearing. HENT:  
   Head: Normocephalic and atraumatic. Nose: Nose normal.  
   Mouth/Throat:  
   Mouth: Mucous membranes are moist.  
Eyes:  
   Extraocular Movements: Extraocular movements intact. Conjunctiva/sclera: Conjunctivae normal.  
   Pupils: Pupils are equal, round, and reactive to light. Cardiovascular:  
   Rate and Rhythm: Tachycardia present. Rhythm irregularly irregular. Pulses: Normal pulses. Radial pulses are 2+ on the right side and 2+ on the left side. Pulmonary:  
   Effort: Pulmonary effort is normal. No respiratory distress. Breath sounds: Normal breath sounds. No stridor. No wheezing. Abdominal:  
   General: Abdomen is flat. There is no distension. Palpations: Abdomen is soft. There is no mass. Tenderness: There is no abdominal tenderness. There is no guarding or rebound. Musculoskeletal:     
   General: No swelling or tenderness. Skin: 
   General: Skin is warm and dry. Capillary Refill: Capillary refill takes less than 2 seconds. Neurological:  
   General: No focal deficit present. Mental Status: She is alert and oriented to person, place, and time. Mental status is at baseline. Sensory: No sensory deficit. Motor: No weakness. EKG: ATRIAL fibrillation at 114, normal axis, no ST elevations, 
 
 
[facility] 
ED Course as of Sep 24 1733 Thu Sep 24, 2020  
1604 XR CHEST PA LAT [PZ] ED Course User Index [PZ] Codi Hall MD  
 80year-old female, past medical history of atrial fibrillation, CAD, presents to the emergency department from PCPs office for nausea, inability to tolerate p.o. for several weeks, patient was found to be tachycardic, in afib 115-120. Sent to the emergency department for evaluation of nausea vomiting, dehydration. Patient tachycardic to 115, otherwise normotensive, afebrile, abdomen soft non-tender nondistended no suspicion for significant abdominal pathology including ischemic bowel, or obstruction.   Possible differentials include exacerbation of A. fib secondary to dehydration, UTI or other infectious processes, myocardial infarction. Citlaly Daniel Will draw CBC, BMP, troponin, coags, urinalysis. Will infuse 500 ml normal saline IV. Will order Chest xray Recent Results (from the past 12 hour(s)) EKG, 12 LEAD, INITIAL Collection Time: 09/24/20  2:31 PM  
Result Value Ref Range Ventricular Rate 114 BPM  
 Atrial Rate 119 BPM  
 QRS Duration 92 ms Q-T Interval 374 ms QTC Calculation (Bezet) 515 ms Calculated R Axis 43 degrees Calculated T Axis -27 degrees Diagnosis Atrial fibrillation with rapid ventricular response ST depression, consider subendocardial injury or digitalis effect Nonspecific T wave abnormality , probably digitalis effect Abnormal ECG No previous ECGs available Confirmed by Priscilla Montes [phone]) on 9/24/2020 3:13:35 PM 
  
CBC WITH AUTOMATED DIFF Collection Time: 09/24/20  2:45 PM  
Result Value Ref Range WBC 11.4 (H) 3.6 - 11.0 K/uL  
 RBC 4.34 3.80 - 5.20 M/uL  
 HGB 14.5 11.5 - 16.0 % HCT 43.1 35.0 - 47.0 % MCV 99.3 (H) 80.0 - 99.0 FL  
 MCH 33.4 26.0 - 34.0 PG  
 MCHC 33.6 30.0 - 36.5 g/dL  
 RDW 17.4 (H) 11.5 - 14.5 % PLATELET 968 083 - 510 K/uL MPV 10.4 8.9 - 12.9 FL  
 NEUTROPHILS 78 (H) 32 - 75 % LYMPHOCYTES 12 12 - 49 % MONOCYTES 8 5 - 13 % EOSINOPHILS 1 0 - 7 % BASOPHILS 0 0 - 1 % IMMATURE GRANULOCYTES 1 (H) 0.0 - 0.5 % ABS. NEUTROPHILS 8.9 (H) 1.8 - 8.0 K/UL  
 ABS. LYMPHOCYTES 1.4 0.8 - 3.5 K/UL  
 ABS. MONOCYTES 0.9 0.0 - 1.0 K/UL  
 ABS. EOSINOPHILS 0.2 0.0 - 0.4 K/UL  
 ABS. BASOPHILS 0.0 0.0 - 0.1 K/UL  
 ABS. IMM. GRANS. 0.1 (H) 0.00 - 0.04 K/UL  
 DF AUTOMATED    
TROPONIN I Collection Time: 09/24/20  2:45 PM  
Result Value Ref Range Troponin-I, Qt. <0.05 <0.05 ng/mL METABOLIC PANEL, COMPREHENSIVE Collection Time: 09/24/20  2:45 PM  
Result Value Ref Range Sodium 137 136 - 145 mmol/L  Potassium 4.4 3.5 - 5.1 mmol/L  
 Chloride 101 97 - 108 mmol/L  
 CO2 28 21 - 32 mmol/L Anion gap 8 5 - 15 mmol/L Glucose 160 (H) 65 - 100 mg/dL BUN 16 6 - 20 mg/dL Creatinine 1.21 (H) 0.55 - 1.02 mg/dL BUN/Creatinine ratio 13 12 - 20 GFR est AA 50 (L) >60 ml/min/1.73m2 GFR est non-AA 42 (L) >60 ml/min/1.73m2 Calcium 9.3 8.5 - 10.1 mg/dL Bilirubin, total 1.4 (H) 0.2 - 1.0 mg/dL AST (SGOT) 23 15 - 37 U/L  
 ALT (SGPT) 26 12 - 78 U/L Alk. phosphatase 114 45 - 117 U/L Protein, total 7.6 6.4 - 8.2 g/dL Albumin 3.3 (L) 3.5 - 5.0 g/dL Globulin 4.3 (H) 2.0 - 4.0 g/dL A-G Ratio 0.8 (L) 1.1 - 2.2 PROTHROMBIN TIME + INR Collection Time: 09/24/20  2:45 PM  
Result Value Ref Range Prothrombin time 17.5 (H) 11.9 - 14.7 sec INR 1.4 (H) 0.9 - 1.1    
 
CBC shows Leukocytosis otherwise normal hemoglobin and hematocrit. Coags are slightly elevated. BMP shows normal electrolytes, hyperglycemia, mild renal insufficiency, Troponin negative. Normal bicarb and AG. Xray reviewed, cardiomegaly, no obvious infiltrate, R ORIF in place 5:00 PM 
Spoke with Dr. Cece Shelton, discussed patient's lab findings and ED course. Patient's older labs reviewed by Dr. Cece Shelton, patient does show some new renal insufficiency today, and hyperglycemia. Patient not significantly nauseous in the ED, repeatedly asked to go home, as there are no significant findings suggestive of an acute process, will plan on discharging patient home, Dr. Cece Shelton agrees with plan. Will follow up with patient in clinic. Discussed results and plan with patient and son, agree with plan. Repeat vitals appreciated, , bp 127/113. J

## 2020-09-26 NOTE — ED TRIAGE NOTES
Pt arrived via EMS from home c/o slurred speech and R sided facial droop starting at 0815 with accompanying full body tremors. Per EMS sxs resolved at 0845 en route to ED. Pt has hx of Afib and take Eliquis. Pt had a fall a month ago fracturing her R humerus. Pt has had increased weakness since the fall and uses a wheelchair to get around. .

## 2020-09-26 NOTE — ED PROVIDER NOTES
HPI .  Patient has a history of atrial fibrillation, albuterol treatments, hyperlipidemia, coronary disease,. Patient previously was living independently until a little less than 4 weeks ago when she fell and fractured her right humerus. She had an open reduction and internal fixation surgery. She has been living with her son. She is using a wheelchair and sometimes holds onto a family member and walks. Patient has atrial fibrillation. It may be paroxysmal because she is taking amiodarone. She is taking her Eliquis twice a day. Today patient had a tremor of all extremities. She also had slurred speech. It was not clearly a rigor and she has no knowledge of having a fever. Apparently EMS thought the patient had right facial droop but family did not notice that and she does not have any facial symptoms or signs in the ER. Past Medical History:  
Diagnosis Date  CAD (coronary artery disease)  Gastrointestinal disorder No past surgical history on file. No family history on file. Social History Socioeconomic History  Marital status:  Spouse name: Not on file  Number of children: Not on file  Years of education: Not on file  Highest education level: Not on file Occupational History  Not on file Social Needs  Financial resource strain: Not on file  Food insecurity Worry: Not on file Inability: Not on file  Transportation needs Medical: Not on file Non-medical: Not on file Tobacco Use  Smoking status: Never Smoker  Smokeless tobacco: Never Used Substance and Sexual Activity  Alcohol use: Not on file  Drug use: Not on file  Sexual activity: Not on file Lifestyle  Physical activity Days per week: Not on file Minutes per session: Not on file  Stress: Not on file Relationships  Social connections Talks on phone: Not on file Gets together: Not on file Attends Yazdanism service: Not on file Active member of club or organization: Not on file Attends meetings of clubs or organizations: Not on file Relationship status: Not on file  Intimate partner violence Fear of current or ex partner: Not on file Emotionally abused: Not on file Physically abused: Not on file Forced sexual activity: Not on file Other Topics Concern  Not on file Social History Narrative  Not on file ALLERGIES: Ambien [zolpidem] and Fosamax [alendronate] Review of Systems All other systems reviewed and are negative. There were no vitals filed for this visit. Physical Exam 
Vitals signs and nursing note reviewed. Constitutional:   
   Appearance: She is well-developed. HENT:  
   Head: Normocephalic and atraumatic. Eyes:  
   Pupils: Pupils are equal, round, and reactive to light. Neck: Musculoskeletal: Normal range of motion and neck supple. Cardiovascular:  
   Rate and Rhythm: Rhythm irregular. Heart sounds: Normal heart sounds. No murmur. No friction rub. No gallop. Comments: Heart rate  Pulmonary:  
   Effort: Pulmonary effort is normal. No respiratory distress. Breath sounds: No wheezing or rales. Abdominal:  
   Palpations: Abdomen is soft. Tenderness: There is no abdominal tenderness. There is no rebound. Musculoskeletal: Normal range of motion. General: No tenderness. Comments: Right upper extremity shoulder immobilizer; healed Steri-Stripped wounds right lateral proximal humerus area Skin: 
   Findings: No erythema. Neurological:  
   Mental Status: She is alert. Cranial Nerves: No cranial nerve deficit. Comments: Motor; symmetric Psychiatric:     
   Behavior: Behavior normal.  
 
  
 
MDM Procedures ED EKG interpretation: 
Rhythm: atrial fib; and irregular. Rate (approx.): 100;  Axis: normal; P wave: ; QRS interval: normal ; ST/T wave: non-specific changes; in  Lead: ; Other findings: . This EKG was interpreted by Amelia Coates MD,ED Provider. 9:48 AM 
 
Note: I just had an opportunity to do a history and physical.  Case discussed with Dr. Palmira Sanz; not a TPA candidate she noted some left lower extremity ataxia and recommends an MRI and physical therapy input about gait safety.  9:59 AM 
Amelia Caotes MD

## 2020-09-27 PROBLEM — G45.9 TIA (TRANSIENT ISCHEMIC ATTACK): Status: ACTIVE | Noted: 2020-01-01

## 2020-09-27 NOTE — ED NOTES
Pt arrives via EMS from her son's house d/t slurred speech and sudden onset weakness. This is patient's third episode of stroke like symptoms in 2 days. PT's LKW was 1630. Her slurred speech and weakness resolved 10 minutes after EMS arrival. Blood sugar for EMS was 177.

## 2020-09-27 NOTE — PROGRESS NOTES
Spiritual Care Assessment/Progress Note [address] 
 
 
NAME: Valentino Ohs      MRN: [id number] AGE: 80 y.o. SEX: female Moravian Affiliation: Baptist Language: English  
 
9/27/2020     Total Time (in minutes): 5 Spiritual Assessment begun in [address] DEP through conversation with: 
  
    []Patient        [] Family    [] Friend(s) Reason for Consult: Other (comment)(Code Stroke) Spiritual beliefs: (Please include comment if needed) 
   [] Identifies with a cherelle tradition:     
   [] Supported by a cherelle community:        
   [] Claims no spiritual orientation:       
   [] Seeking spiritual identity:            
   [] Adheres to an individual form of spirituality:       
   [x] Not able to assess:                   
 
    
Identified resources for coping:  
   [] Prayer                           
   [] Music                  [] Guided Imagery 
   [] Family/friends                 [] Pet visits [] Devotional reading                         [x] Unknown 
   [] Other:                                         
 
 
Interventions offered during this visit: (See comments for more details) Plan of Care: 
 
 [] Support spiritual and/or cultural needs  
 [] Support AMD and/or advance care planning process    
 [] Support grieving process 
 [] Coordinate Rites and/or Rituals  
 [] Coordination with community clergy [] No spiritual needs identified at this time 
 [] Detailed Plan of Care below (See Comments)  [] Make referral to Music Therapy 
[] Make referral to Pet Therapy    
[] Make referral to Addiction services 
[] Make referral to [facility] 
[] Make referral to Spiritual Care Partner 
[] No future visits requested       
[x] Follow up visits as needed Comments:  responded to Code Stroke in emergency Room. Staff with patient providing care. No family appears to be present. Will continue to follow up as needed and upon request as able. Visited by Rev. Chivo Parry MDiv, ThKALLI, [facility]  paging service: [phone])

## 2020-09-27 NOTE — ED PROVIDER NOTES
Pt presenting with EMS as a Level 1 code stroke. Per EMS, pt noted to have episode at home where she lost all strength in her extremities and began having slurred speech. They report that her symptoms resolved during transport. Pt arrives AAO x 4, answering questions appropriately. Denies any current symptoms. Past Medical History:  
Diagnosis Date  CAD (coronary artery disease)  Gastrointestinal disorder History reviewed. No pertinent surgical history. History reviewed. No pertinent family history. Social History Socioeconomic History  Marital status:  Spouse name: Not on file  Number of children: Not on file  Years of education: Not on file  Highest education level: Not on file Occupational History  Not on file Social Needs  Financial resource strain: Not on file  Food insecurity Worry: Not on file Inability: Not on file  Transportation needs Medical: Not on file Non-medical: Not on file Tobacco Use  Smoking status: Never Smoker  Smokeless tobacco: Never Used Substance and Sexual Activity  Alcohol use: Not Currently  Drug use: Not Currently  Sexual activity: Not on file Lifestyle  Physical activity Days per week: Not on file Minutes per session: Not on file  Stress: Not on file Relationships  Social connections Talks on phone: Not on file Gets together: Not on file Attends Druze service: Not on file Active member of club or organization: Not on file Attends meetings of clubs or organizations: Not on file Relationship status: Not on file  Intimate partner violence Fear of current or ex partner: Not on file Emotionally abused: Not on file Physically abused: Not on file Forced sexual activity: Not on file Other Topics Concern  Not on file Social History Narrative  Not on file ALLERGIES: Ambien [zolpidem] and Fosamax [alendronate] Review of Systems Constitutional: Negative for chills and fever. HENT: Negative for drooling and nosebleeds. Eyes: Negative for pain and itching. Respiratory: Negative for choking and stridor. Cardiovascular: Negative for leg swelling. Gastrointestinal: Negative for abdominal distention and rectal pain. Endocrine: Negative for heat intolerance and polyphagia. Genitourinary: Negative for enuresis and genital sores. Musculoskeletal: Negative for arthralgias and joint swelling. Skin: Negative for color change. Allergic/Immunologic: Negative for immunocompromised state. Neurological: Positive for speech difficulty and weakness. Negative for tremors. Hematological: Negative for adenopathy. Psychiatric/Behavioral: Negative for dysphoric mood and sleep disturbance. Vitals:  
 09/27/20 1731 09/27/20 1800 09/27/20 1900 BP: 114/71 121/74 125/77 Pulse: 100 (!) 108 (!) 105 Resp: 23 25 20 Temp: 98.3 °F (36.8 °C) SpO2: 97% 96% 96% Weight: 55.7 kg (122 lb 12.7 oz) Physical Exam 
Vitals signs and nursing note reviewed. Constitutional:   
   General: She is not in acute distress. Appearance: She is well-developed. She is not ill-appearing, toxic-appearing or diaphoretic. HENT:  
   Head: Normocephalic. Nose: Nose normal.  
Eyes:  
   Conjunctiva/sclera: Conjunctivae normal.  
Neck: Musculoskeletal: Normal range of motion and neck supple. Cardiovascular:  
   Rate and Rhythm: Regular rhythm. Heart sounds: Normal heart sounds. Pulmonary:  
   Effort: Pulmonary effort is normal. No respiratory distress. Abdominal:  
   General: There is no distension. Palpations: Abdomen is soft. Musculoskeletal:     
   General: No deformity. Comments: Right arm in sling. +ecchymosis along right shoulder. Skin: 
   General: Skin is warm and dry. Neurological: Mental Status: She is alert and oriented to person, place, and time. Cranial Nerves: Cranial nerve deficit present. Sensory: No sensory deficit. Motor: No weakness. Coordination: Coordination normal.  
Psychiatric:     
   Behavior: Behavior normal.  
 
  
 
MDM Number of Diagnoses or Management Options Slurred speech:  
Weakness of extremity:  
Diagnosis management comments: Critical care time: 45 mins ED Course as of Sep 27 1942 Sun Sep 27, 2020  
1712 Pt with 3 episodes in last 48 hours. She is currently asymptomatic. Recommends CT head today (no need for repeat CTA or MRI). Recommends admission for possible repeat MRI brain, MRI c-spine and EEG along with neurology workup. Needs work up for TIA vs seizure vs myleopathy. As pt has no symptoms, will not examine pt at this time. [AL] [id number] with neurointerventional team :aware of images from yesterday. Recommend CTA and perfusion along with MRI brain today. [AL] 1754 Pt on blood thinner. Not a candidate for tPA. [AL] [id number] ED EKG interpretation: 
Rhythm: atrial fibrillation and irregular. Rate (approx.): 102; Axis: normal; ST/T wave: non-specific changes; No evidence of acute coronary ischemia. [AL] ED Course User Index [AL] Jim Seals MD  
 
 
Procedures Perfect Serve Consult for Admission 7:43 PM 
 
ED Room Number: BD46/64 Patient Name and age:  Alicia Klein 80 y.o.  female Working Diagnosis: 1. Slurred speech 2. Weakness of extremity COVID-19 Suspicion:  no 
Sepsis present:  no  Reassessment needed: no 
Code Status:  Full Code Readmission: no 
Isolation Requirements:  no 
Recommended Level of Care:  telemetry Department:[facility] Adult ED - [phone] Other:  Pt presenting with episode of extremity weakness, slurred speech. Resolved PTA to ED. Similar complaints yesterday with normal CT, CTA and MRI. Discharged home.  Back today with similar episode - teleneurology recommends admission for TIA/CVA vs seizure vs myelopathy work up, along with EEG and neurology evaluation. Interventional neurology at bedside who requested new imaging today. Pt on blood thinner, not a tPA candidate. Patient is being admitted to the hospital.  The results of their tests and reasons for their admission have been discussed with them and/or available family. They convey agreement and understanding for the need to be admitted and for their admission diagnosis.

## 2020-09-27 NOTE — PROGRESS NOTES
Neurocritical Care Code Stroke Documentation Symptoms:  Slurred speech, generalized weakness (reportedly her symptoms resolved 10- minutes after EMS arrival), however, upon examination focal findings noted. Patient reports this is her third time within the past few days having recurrent symptoms, was here yesterday with slurred speech and tremors of her extremities. She had CT/CTA/CTP at that time which were negative for acute findings. Last Known Well: 1630 pm today Medical hx: Atrial fibrillation on Eliquis, CAD, Hyperlipidemia, patient had a fall a month ago and fractured her right humerus s/p open reduction and internal fixation surgery(currently in an arm sling), patient reports she is legally blind in right eye Anticoagulation: On Eliquis VAN:   Negative NIHSS:   1a-LOC:0 
  1b-Month/Age:1 
  1c-Open/Close Hand:0 2-Best Gaze:1 3-Visual Fields:0 
  4-Facial Palsy:0 
  5a-Left Arm:0 
  5b-Right Arm:2 
  6a-Left Le 
  6b-Right Le 
  7-Limb Ataxia:2 
  8-Sensory:1 
  9-Best Language:0 
  10-Dysarthria:1 
  11-Extinction/Inattention:0 
TOTAL SCORE:8  
Imaging:   CT: No acute changes. Plan:   TPA Candidate: NO 
  Mechanical thrombectomy Candidate: No signs of LVO clinically. Given recurrence of symptoms and noting difficulty with moving right eye to the left on exam, recommend checking CTA/CTP for any interval changes. Discussed with Dr. Wing Hussein (ED Physician) and Dr. Amador Keita ([facility] on call). Time spent: 20 minutes. Fly Ortiz NP Neurocritical Care Nurse Practitioner [phone] Addendum:1836 CTA/CTP shows no large vessel occlusion. No perfusion abnormality.

## 2020-09-28 NOTE — ACP (ADVANCE CARE PLANNING)
Advance Care Planning Note Name: Alicia Klein YOB: 1927 MRN: [id number] Admission Date: 9/27/2020  4:55 PM 
 
Date of discussion: 9/27/2020 Active Diagnoses: 
 
Hospital Problems  Never Reviewed Codes Class Noted POA  
 TIA (transient ischemic attack) ICD-10-CM: G45.9 ICD-9-CM: 435.9  9/27/2020 Unknown These active diagnoses are of sufficient risk that focused discussion on advance care planning is indicated in order to allow the patient to thoughtfully consider personal goals of care, and if situations arise that prevent the ability to personally give input, to ensure appropriate representation of their personal desires for different levels and aggressiveness of care. Discussion:  
 
Persons present and participating in discussion: Jasiel Costa MD, Son 
 
Discussion:  
CPR, Cardioversion,Pacing,Pressors, Intubation,Bipap/CPAP,Antibiotics,Renal failure needing Dialysis. CODE STATUS: DNR Pt son reports she has an advance directive at home. Verified with patient about code status and she would like DNR. Total time spent face-to-face in education and discussion: 17 minutes.   
 
Alyssa Vega MD 
9/27/2020 
11:23 PM

## 2020-09-28 NOTE — ACP (ADVANCE CARE PLANNING)
Advance Care Planning Advance Care Planning Activator (Inpatient) Conversation Note Date of ACP Conversation: 09/28/20 Conversation Conducted with:   Patient with Decision Making Capacity Healthcare Decision Maker: Named in Advance Directive or Healthcare Power of  (name) Myla Romero and Stephane Shin ACP Activator: LILIA Perry Health Care Decision Maker: 
 
Current Designated Health Care Decision Maker:   Primary Decision Maker: Quincy Hernandez - Son - [phone] Secondary Decision Maker: Trinity Lee - Son - [phone] Care Preferences Resuscitation \"CPR works best to restart the heart when there is a sudden event, like a heart attack, in someone who is otherwise healthy. Unfortunately, CPR does not typically restart the heart for people who have serious health conditions or who are very sick. \" \"In the event your heart stopped as a result of an underlying serious health condition, would you want attempts to be made to restart your heart (answer \"yes\" for attempt to resuscitate) or would you prefer a natural death (answer \"no\" for do not attempt to resuscitate)? \" no Per Shobha Weaver, patient told MD last night that she wanted to be DNR. Both Shobha Owen and Savannah Rich are in agreement with patient's wishes. [x] Yes  [] No   Educated Patient / Suzanne Fraction regarding differences between Advance Directives and portable DNR orders. Length of ACP Conversation in minutes:   
 
Conversation Outcomes: 
[] ACP discussion completed [x] Existing advance directive reviewed with patient; no changes to patient's previously recorded wishes 
 
 [] New Advance Directive completed 
 [] Portable Do Not Resuscitate prepared for Provider review and signature 
[] POLST/POST/MOLST/MOST prepared for Provider review and signature Attempted to speak with patient in the room. She gave approval for CM to speak with her son.  CM called Stephane Shin, who then conferenced in his brother Contreras Figures. Rangel sent copy of AMD to this CM. CM placed in chart. Per AMD, patient's 4st mPOA was her late . This will now make Franklyn Never the primary decision maker and then Abram Said will be secondary. Franklyn Never lives in [place] and Abram Said lives locally. The youngest brother Kim Reid lives in [place]. He is not listed on the AMD. Per Abram Said, patient told MD last night that she did not wish to be resuscitated. This is confirmed in hospitalist ACP note. Patient is currently listed at full code. Will notify covering hospitalist now.   
 
LILIA Rebolledo

## 2020-09-28 NOTE — PROCEDURES
ELECTROENCEPHALOGRAM REPORT Patient Name: Tony Elliott : 5/15/1927 Age: 80 y.o. Ordering physician: Dr. Janusz Mejia Date of EEG: 2020 Interpreting physician: Arvind Addison MD 
 
PROCEDURE: Routine video electroencephalogram (vEEG). CLINICAL INDICATION: The patient is a 80 y.o. female who is being evaluated for persistent dysarthria. DESCRIPTION OF THE RECORD:  
During wakefulness, there is a well-formed rhythm composed of 7 to 8 Hz activity with symmetric preserved anterior to posterior gradient. No focal slowing or interposed frequencies are noted. Transition to drowsiness is manifest by fragmentation of the waking background with progressive increase in theta. No discrete sleep echotexture is appreciated. No epileptiform discharges or electrographic seizures are noted. Activity is present environmental stimuli without photic driving noted. Single-lead ECG demonstrates normal sinus rhythm. INTERPRETATION:  
 
This is an essentially normal awake and drowsy routine EEG given patient's age.  
 
 
Wu Salas MD

## 2020-09-28 NOTE — PROGRESS NOTES
[address] [facility] Adult  Hospitalist Group Hospitalist Progress Note Alfonso Mace MD 
Answering service: [phone] OR [phone] from in house phone Date of Service:  2020 NAME:  Tristian Ayala :  5/15/1927 MRN:  [id number] Admission Summary:  
 
Tristian Ayala is a 80 y.o. female who presents with slurred speech and weakness. Patient was seen in ER one day ago for stroke symptoms and worked up including had an MRI which was negative for acute stroke. She was discharged home and she presents back with persistent slurred speech and weakness. There was no report of facial asymmetry or droop, focal hand or leg weakness. There is more like generalized weakness. While in the ER blood sugar per EMS was 177. Vital signs in ED showed temperature 98.3 pulse rate of 100 respiratory 23 pressure 114/71 O2 sats 97% on room air When she was in the ED, EKG showed A. fib rate controlled She had a CT scan code neuro which was negative acute stroke or large vessel occlusion She was deemed not a tpa candidate per tele neuro. An MRI of the brain has been ordered and was pending The patient was in the room with her son. She feels like nothing is wrong with her. She had a recent right shoulder fracture for which she had been to rehab. She reports that the right shoulder still hurtful and also she is having nausea associated with this. Interval history / Subjective: She said she has pain on her right shoulder 10/10, she said her speech better Assessment & Plan:  
 
Slurred speech and generalized weakness possible due to TIA -her symptoms resolved in 10 minutes 
-on Eliquis, aspirin and pravachol 
-CT head no acute change 
-CTA head/CT perfusion no acute large vessel occlusion 
-MRI chronic white matter disease with no acute infarction. -lipid panel is normal 
-Echo pending -troponin <0.05 
-Seen by Neurology and cleared her for Thyroid cyst and abnormal thyroid function 
-2.6 cm cystic nodule right thyroid lobe on CTA head, not sure clinical significance -TSH elevated, free T4 normal, patient on amiodarone  
-repeat thyroid function in 4 weeks Possible UTI POA 
-UA wbc >100, LE moderate, nitrites positive and bacteria 3+ 
-afebrile, no leukocytosis 
-add on urine cx 
-start ceftriaxone Chronic A Fib, rate controlled 
-EKG atrial fibrillation vent rate 102 non specific st t wave 
-on amiodarone, metoprolol and eliquis Hyperglycemia  
-A1c 5.6 
-no further intervention Hx of right humerus fracture s/p ORIF 
-has right shoulder pain 
-continue prn tylenol Code status: DNR 
DVT prophylaxis: Eliquis Care Plan discussed with: Patient/Family, Nurse and  Anticipated Disposition: Home w/Family Anticipated Discharge: 24 hours to 48 hours I called her son, Randi Arreaga at , updated the clinical condition and plan of care and answered questions Hospital Problems  Never Reviewed Codes Class Noted POA  
 TIA (transient ischemic attack) ICD-10-CM: G45.9 ICD-9-CM: 435.9  9/27/2020 Unknown Vital Signs:  
 Last 24hrs VS reviewed since prior progress note. Most recent are: 
Visit Vitals /76 (BP 1 Location: Left arm, BP Patient Position: Supine) Pulse 95 Temp 98 °F (36.7 °C) Resp 14 Wt 55.7 kg (122 lb 12.7 oz) SpO2 96% BMI 22.46 kg/m² Intake/Output Summary (Last 24 hours) at 9/28/2020 1155 Last data filed at 9/28/2020 1000 Gross per 24 hour Intake 1195 ml Output  Net 1195 ml Physical Examination:  
 
 
     
Constitutional:  No acute distress, cooperative, pleasant ENT:  Oral mucosa moist, oropharynx benign. Resp:  Decrease bronchial breath sound bilaterally. No wheezing/rhonchi/rales. No accessory muscle use CV:  Irregular rhythm, normal rate, no murmurs, gallops, rubs GI:  Soft, non distended, non tender. normoactive bowel sounds, no hepatosplenomegaly Musculoskeletal:  No edema, right arm sling Neurologic:  Moves all extremities. AAOx3, CN II-XII reviewed Skin:  Good turgor, no rashes or ulcers Data Review:  
 Review and/or order of clinical lab test 
Review and/or order of tests in the radiology section of CPT Review and/or order of tests in the medicine section of CPT Labs:  
 
Recent Labs  
  09/28/20 0442 09/27/20 1722 WBC 7.8 8.9 HGB 13.4 12.7 HCT 39.7 38.1  270 Recent Labs  
  09/28/20 0442 09/27/20 1722 09/26/20 
6715 * 131* 132* K 4.1 4.3 3.9  101 100 CO2 27 26 27 BUN 13 17 18 CREA 0.84 1.00 1.13* * 134* 157* CA 8.5 8.6 8.7 Recent Labs  
  09/27/20 1722 09/26/20 
3145 ALT 25 24 AP 96 95 TBILI 0.8 1.3* TP 6.2* 6.7 ALB 2.5* 2.8*  
GLOB 3.7 3.9 Recent Labs  
  09/27/20 1722 09/26/20 
1916 INR 1.1 1.2* PTP 11.5* 11.8* No results for input(s): FE, TIBC, PSAT, FERR in the last 72 hours. No results found for: FOL, RBCF No results for input(s): PH, PCO2, PO2 in the last 72 hours. Recent Labs  
  09/27/20 1722 TROIQ <0.05 Lab Results Component Value Date/Time Cholesterol, total 123 09/28/2020 04:42 AM  
 HDL Cholesterol 53 09/28/2020 04:42 AM  
 LDL, calculated 55.4 09/28/2020 04:42 AM  
 Triglyceride 73 09/28/2020 04:42 AM  
 CHOL/HDL Ratio 2.3 09/28/2020 04:42 AM  
 
No results found for: Syble Bumps Lab Results Component Value Date/Time  Color YELLOW/STRAW 09/27/2020 11:10 PM  
 Appearance CLOUDY (A) 09/27/2020 11:10 PM  
 Specific gravity <1.005 09/27/2020 11:10 PM  
 pH (UA) 7.5 09/27/2020 11:10 PM  
 Protein TRACE (A) 09/27/2020 11:10 PM  
 Glucose Negative 09/27/2020 11:10 PM  
 Ketone Negative 09/27/2020 11:10 PM  
 Bilirubin Negative 09/27/2020 11:10 PM  
 Urobilinogen 1.0 09/27/2020 11:10 PM  
 Nitrites Positive (A) 09/27/2020 11:10 PM  
 Leukocyte Esterase MODERATE (A) 09/27/2020 11:10 PM  
 Epithelial cells FEW 09/27/2020 11:10 PM  
 Bacteria 3+ (A) 09/27/2020 11:10 PM  
 WBC >100 (H) 09/27/2020 11:10 PM  
 RBC 10-20 09/27/2020 11:10 PM  
 
 
 
Medications Reviewed:  
 
Current Facility-Administered Medications Medication Dose Route Frequency  cefTRIAXone (ROCEPHIN) 1 g in 0.9% sodium chloride (MBP/ADV) 50 mL  1 g IntraVENous Q24H  
 0.9% sodium chloride infusion  100 mL/hr IntraVENous CONTINUOUS  
 amiodarone (CORDARONE) tablet 200 mg  200 mg Oral DAILY  apixaban (ELIQUIS) tablet 2.5 mg  2.5 mg Oral BID  latanoprost (XALATAN) 0.005 % ophthalmic solution 1 Drop  1 Drop Both Eyes QHS  metoprolol tartrate (LOPRESSOR) tablet 50 mg  50 mg Oral QPM  
 pravastatin (PRAVACHOL) tablet 10 mg  10 mg Oral QHS  timolol (TIMOPTIC) 0.5 % ophthalmic solution 1 Drop  1 Drop Both Eyes BID  acetaminophen (TYLENOL) tablet 500 mg  500 mg Oral Q8H  
 acetaminophen (TYLENOL) tablet 650 mg  650 mg Oral Q4H PRN Or  
 acetaminophen (TYLENOL) solution 650 mg  650 mg Per NG tube Q4H PRN Or  
 acetaminophen (TYLENOL) suppository 650 mg  650 mg Rectal Q4H PRN  
 aspirin chewable tablet 81 mg  81 mg Oral DAILY  labetaloL (NORMODYNE;TRANDATE) injection 5 mg  5 mg IntraVENous Q10MIN PRN Current Outpatient Medications Medication Sig  
 multivitamins-minerals-lutein (MEN'S CENTRUM SILVER WITH LUTEIN) tab tablet Take 1 Tab by mouth daily.  acetaminophen (TYLENOL) 500 mg tablet Take 500 mg by mouth every eight (8) hours.  apixaban (Eliquis) 2.5 mg tablet Take 2.5 mg by mouth two (2) times a day.  metoprolol tartrate (LOPRESSOR) 50 mg tablet Take 50 mg by mouth every evening.  pravastatin (PRAVACHOL) 10 mg tablet Take 10 mg by mouth nightly.   
 albuterol (PROVENTIL HFA, VENTOLIN HFA, PROAIR HFA) 90 mcg/actuation inhaler Take 1 Puff by inhalation three (3) times daily as needed for Shortness of Breath.  amiodarone (CORDARONE) 200 mg tablet Take 200 mg by mouth daily.  calcium carbonate (TUMS) 200 mg calcium (500 mg) chew Take 1 Tab by mouth two (2) times a day.  timolol (TIMOPTIC) 0.5 % ophthalmic solution Administer 1 Drop to both eyes two (2) times a day.  latanoprost (XALATAN) 0.005 % ophthalmic solution Administer 1 Drop to both eyes nightly.  
 
______________________________________________________________________ EXPECTED LENGTH OF STAY: - - - 
ACTUAL LENGTH OF STAY:          0 Valerio Vyas MD

## 2020-09-28 NOTE — PROGRESS NOTES
Speech pathology note Reviewed chart and note patient admitted with slurred speech and weakness with concern for CVA, however deficits resolved. Note CT showed no acute changes and MRI showed chronic white matter disease with no acute infarction. Note patient passed the STAND and a regular diet was ordered. Per neurology, patient feels back to baseline and mild dysarthria is likely baseline. Formal SLP evaluation not clinically indicated at this time. Will sign off. Please re-consult if further needs arise. Thank you. Lorin Robb, Arlet Gomez., CCC-SLP

## 2020-09-28 NOTE — ROUTINE PROCESS
Patient: Bri Diez MRN: [id number]    Age: 80 y.o. YOB: 1927 Room/Bed: [facility] Consent for video monitoring obtained after the below has been explained to the patient/family on 9/28/2020: 
1. They are being monitored continuously in an effort to promote their safety. 2. That there may be times when the camera will be discontinued to provide care to me to ensure my dignity, such as during bathing or any activity that risks me being exposed. 3. They have the right to opt out of having this surveillance monitoring at any time. 4. It has been explained to the patient/family and they understand that the hospital does not maintain any recording of this surveillance monitoring. Baldomero Robertson

## 2020-09-28 NOTE — PROGRESS NOTES
Problem: Falls - Risk of 
Goal: *Absence of Falls Description: Document Careyluis felipe Mcmullen Fall Risk and appropriate interventions in the flowsheet. Outcome: Progressing Towards Goal 
Note: Fall Risk Interventions: 
Mobility Interventions: PT Consult for mobility concerns, PT Consult for assist device competence Mentation Interventions: More frequent rounding, Bed/chair exit alarm, Door open when patient unattended Medication Interventions: Evaluate medications/consider consulting pharmacy, Bed/chair exit alarm Elimination Interventions: Call light in reach History of Falls Interventions: Consult care management for discharge planning, Bed/chair exit alarm

## 2020-09-28 NOTE — PROGRESS NOTES
Problem: Mobility Impaired (Adult and Pediatric) Goal: *Acute Goals and Plan of Care (Insert Text) Description: FUNCTIONAL STATUS PRIOR TO ADMISSION: Patient was independent and active without the use of DME prior to her shoulder injury (R humeral fx w/ ORIF) about a month ago. Since that time, the patient has relied on a manual wheelchair pushed by caregivers for ambulation. She is able to walk short distances with considerable assistance from caregivers. She was not able to sit unsupported. HOME SUPPORT PRIOR TO ADMISSION: The patient lived alone with no support required prior to her shoulder injury. She discharged from [facility] to her son and daughter in [address] [place] [address]. Caregivers assist with adls and mobility. Patient is open to home health for PT/ OT. Physical Therapy Goals Initiated 9/28/2020 1. Patient will move from supine to sit and sit to supine , scoot up and down, and roll side to side in bed with moderate assistance  within 7 day(s). 2.  Patient will transfer from bed to chair and chair to bed with moderate assistance  using the least restrictive device within 7 day(s). 3.  Patient will perform sit to stand with moderate assistance  within 7 day(s). 4.  Patient will ambulate with moderate assistance  for 25 feet with the least restrictive device within 7 day(s). Outcome: Not Met PHYSICAL THERAPY EVALUATION Patient: Luis F Brizuela (45 y.o. female) Date: 9/28/2020 Primary Diagnosis: TIA (transient ischemic attack) [G45.9] Precautions: Fall(RUE sling s/p ORIF approx 4 wks ago) ASSESSMENT Based on the objective data described below, the patient presents with generalized weakness, pain (RUE), impaired sitting balance, and decreased tolerance to activity making it difficult for her to transition positions, sit edge of bed and unable to stand s/p admission for TIA.  MRI shows no acute abnormality. Spoke with patient's son by phone re: patient's recent functional mobility status. Level of assist required today appears consistent with the level of assist patient required at home since her fall about a month ago w/ Rt ORIF. Current Level of Function Impacting Discharge (mobility/balance): Max assist for supine<->sit and sitting balance. Unable to stand today. Functional Outcome Measure: The patient scored 0/56 on the Philippe outcome measure which is indicative of high fall risk. Other factors to consider for discharge: Currently living with son and DIL; level of assist required Patient will benefit from skilled therapy intervention to address the above noted impairments. PLAN : 
Recommendations and Planned Interventions: bed mobility training, transfer training, gait training, therapeutic exercises, neuromuscular re-education, patient and family training/education, and therapeutic activities Frequency/Duration: Patient will be followed by physical therapy:  5 times a week to address goals. Recommendation for discharge: (in order for the patient to meet his/her long term goals) Therapy up to 5 days/week in SNF setting or intensive home health therapy program 
 
This discharge recommendation: 
Has not yet been discussed the attending provider and/or case management IF patient discharges home will need the following DME: patient owns DME required for discharge SUBJECTIVE:  
Patient stated I'm tired.  OBJECTIVE DATA SUMMARY:  
HISTORY:   
Past Medical History:  
Diagnosis Date CAD (coronary artery disease) Gastrointestinal disorder History reviewed. No pertinent surgical history. Personal factors and/or comorbidities impacting plan of care: PMH and injury Home Situation Home Environment: Private residence # Steps to Enter: 5 Rails to Enter: Yes One/Two Story Residence: Two story, live on 1st floor Living Alone: (l/w son, dtr in law, and 2 small dogs) Support Systems: Family member(s) Patient Expects to be Discharged to[de-identified] Private residence Current DME Used/Available at Home: Wheelchair(HHA family to walk) Tub or Shower Type: Shower EXAMINATION/PRESENTATION/DECISION MAKING:  
Critical Behavior: 
Neurologic State: Alert Orientation Level: Oriented X4 Cognition: Follows commands Range Of Motion: 
AROM: Generally decreased, functional 
  
  
  
  
  
  
  
Strength:   
Strength: Generally decreased, functional 
  
  
  
  
  
  
Tone & Sensation:  
Tone: Normal 
  
  
  
  
Sensation: Intact Coordination: 
Coordination: Generally decreased, functional 
Vision:  
Acuity: Impaired far vision; Impaired near vision Functional Mobility: 
Bed Mobility: 
Rolling: Maximum assistance Supine to Sit: Maximum assistance Sit to Supine: Maximum assistance Scooting: Maximum assistance Transfers: 
Unable to attempt secondary to poor sitting balance & level of assist required to sit eob Balance:  
Sitting: Impaired; With support Sitting - Static: Poor (constant support) Sitting - Dynamic: Poor (constant support) Ambulation/Gait Training: 
  
  
  
  
  
  
  
  
  
  
  
   
 
 
Functional Measure: 
Dalzell Ashley Balance Test: 
 
Sitting to Standin Standing Unsupported: 0 Sitting with Back Unsupported: 0 Standing to Sittin Transfers: 0 Standing Unsupported with Eyes Closed: 0 Standing Unsupported with Feet Together: 0 Reach Forward with Outstretched Arm: 0  Object: 0 Turn to Look Over Shoulders: 0 Turn 360 Degrees: 0 Alternate Foot on Step/Stool: 0 Standing Unsupported One Foot in Front: 0 Stand on One Le Total: 0/56 
  
 
 
56=Maximum possible score;  
0-20=High fall risk 21-40=Moderate fall risk 41-56=Low fall risk Physical Therapy Evaluation Charge Determination History Examination Presentation Decision-Making MEDIUM  Complexity : 1-2 comorbidities / personal factors will impact the outcome/ POC  MEDIUM Complexity : 3 Standardized tests and measures addressing body structure, function, activity limitation and / or participation in recreation  LOW Complexity : Stable, uncomplicated  LOW Complexity : FOTO score of  Based on the above components, the patient evaluation is determined to be of the following complexity level: LOW Pain Rating: 
Patient voiced no pain Activity Tolerance:  
Fair and patient voiced feeling tired, wanting to return to bed Please refer to the flowsheet for vital signs taken during this treatment. After treatment patient left in no apparent distress:  
Supine in bed, Call bell within reach, and Side rails x 3 
 
COMMUNICATION/EDUCATION:  
The patients plan of care was discussed with: Registered nurse. Fall prevention education was provided and the patient/caregiver indicated understanding., Patient/family have participated as able in goal setting and plan of care. , and Patient/family agree to work toward stated goals and plan of care. Thank you for this referral. 
Lauren Rodriguez, PT Time Calculation: 23 mins

## 2020-09-28 NOTE — ED NOTES
Bedside shift change report given to Isidra Barth (oncoming nurse) by Kym Barajas (offgoing nurse). Report included the following information SBAR, ED Summary and MAR.

## 2020-09-28 NOTE — ED NOTES
Bedside shift change report given to Sissy Day  (oncoming nurse) by Arnold Miranda (offgoing nurse). Report included the following information SBAR, ED Summary and Recent Results.

## 2020-09-28 NOTE — PROGRESS NOTES
Problem: Self Care Deficits Care Plan (Adult) Goal: *Acute Goals and Plan of Care (Insert Text) Description:  
FUNCTIONAL STATUS PRIOR TO ADMISSION: Patient was independent with ADLs. Discharged from [facility] 9/22/2020 to [facility] s/p fall and R humeral fx with ORIF. The patient was functional at the wheelchair level and HHA for sit<>stand and functional mobility. Patient required moderate assistance for basic and total assist instrumental ADLs. Was about to have aid setup and HHOT when this episode occurred. HOME SUPPORT: The patient lived alone. However, then discharged to [facility]. Occupational Therapy Goals Initiated 9/28/2020651. Patient will perform self-feeding with supervision/set-up within 7 day(s). 2.  Patient will perform grooming with supervision/set-up within 7 day(s). 3.  Patient will perform sitting unsupported ADLs 4 mins with minimal assistance/contact guard assist within 7 day(s). 4.  Patient will perform BSC toilet transfers with moderate assistance  within 7 day(s). 5.  Patient will perform all aspects of toileting with moderate assistance  within 7 day(s). 6.  Patient will perform upper body dressing with max A within 7 days. Outcome: Not Met OCCUPATIONAL THERAPY EVALUATION Patient: Luis F Brizuela (41 y.o. female) Date: 9/28/2020 Primary Diagnosis: TIA (transient ischemic attack) [G45.9] Precautions:   Fall(right UE in sling s/p fracture) ASSESSMENT Based on the objective data described below, the patient presents with pain R UE, fear of falling, cognition (complex processing, STM loss), sitting balance with posterior lean, decreased strength throughout and ROM, poor functional reach and ability to come to standing.  
 
Current Level of Function Impacting Discharge (ADLs/self-care): max A upper body ADLs overall, total A lower body ADLs, sitting tolerance unsupported 7 mins max A 
 
 Functional Outcome Measure: The patient scored Total: 35/100 on the Barthel Index outcome measure which is indicative of 65% impaired ability to care for basic self needs/dependency on others; inferred 100% dependency on others for instrumental ADLs. Other factors to consider for discharge: son and DIL about to have aid setup in home to assist 
  
Patient will benefit from skilled therapy intervention to address the above noted impairments. PLAN : 
Recommendations and Planned Interventions: self care training, functional mobility training, therapeutic exercise, balance training, visual/perceptual training, therapeutic activities, cognitive retraining, endurance activities, neuromuscular re-education, patient education, home safety training and family training/education Frequency/Duration: Patient will be followed by occupational therapy 3 times a week to address goals. Recommendation for discharge: (in order for the patient to meet his/her long term goals) Therapy up to 5 days/week in SNF setting or an intensive home health therapy program 
 
This discharge recommendation: 
Has been discussed the attending provider and/or case management IF patient discharges home will need the following DME: gait belt, BSC  
 
 
SUBJECTIVE:  
Patient stated Do not over work me, I am 80years old.  OBJECTIVE DATA SUMMARY:  
HISTORY:  
Past Medical History:  
Diagnosis Date  CAD (coronary artery disease)  Gastrointestinal disorder History reviewed. No pertinent surgical history. Expanded or extensive additional review of patient history:  
 
Home Situation Home Environment: Private residence # Steps to Enter: 5 Rails to Enter: Yes One/Two Story Residence: Two story, live on 1st floor Living Alone: (l/w son, dtr in law, and 2 small dogs) Support Systems: Family member(s) Patient Expects to be Discharged to[de-identified] Private residence Current DME Used/Available at Home: Wheelchair(HHA family to walk) Tub or Shower Type: Shower Hand dominance: Right EXAMINATION OF PERFORMANCE DEFICITS: 
Cognitive/Behavioral Status: 
Neurologic State: Alert Orientation Level: Disoriented to situation(POC- disoriented to place at times) Cognition: No command following;Decreased attention/concentration; Follows commands Skin: intact Edema: intact Hearing: 
  
 
Vision/Perceptual:   
    
    
    
  
    
Acuity: Impaired far vision; Impaired near vision Range of Motion: L shoulder flexion 90* R shoulder elevation decreased, elbow slightly decreased, wrist -digits WDL AROM: Generally decreased, functional 
  
  
  
  
  
  
  
 
Strength: 
L shoulder flexion -3/5; elbow-digits +3/5 
R not formally tested 2* recent fx, wrist - digits +3/5 Strength: Generally decreased, functional 
  
  
  
  
 
Coordination: 
Coordination: Generally decreased, functional 
Fine Motor Skills-Upper: Left Intact; Right Intact Gross Motor Skills-Upper: Left Intact; Right Impaired Tone & Sensation: 
Cold and heat intact Tone: Normal 
Sensation: Intact Balance: 
  
 
Functional Mobility and Transfers for ADLs: 
Bed Mobility: 
  
 
Transfers: ADL Assessment: 
Feeding: Minimum assistance setup containers, self feeding with L UE increased time Oral Facial Hygiene/Grooming: Moderate assistance sitting EOB poor balance, assist for balance, setup in left hand, patient utilizing B UEs but mostly L UE increased time Bathing: Total assistance Upper Body Dressing: Moderate assistance dressing R UE first no cues Lower Body Dressing: Total assistance poor reach, unable to stand Toileting: Total assistance poor reach ADL Intervention and task modifications: 
  
 
  
 
  
 
  
 
  
 
  
 
  
 
  
 
Therapeutic Exercise: 
  
Functional Measure: 
Barthel Index: 
 
Bathin Bladder: 5 Bowels: 10 
 Groomin Dressin Feedin Mobility: 0 Stairs: 0 Toilet Use: 5 Transfer (Bed to Chair and Back): 5 Total: 35/100 The Barthel ADL Index: Guidelines 1. The index should be used as a record of what a patient does, not as a record of what a patient could do. 2. The main aim is to establish degree of independence from any help, physical or verbal, however minor and for whatever reason. 3. The need for supervision renders the patient not independent. 4. A patient's performance should be established using the best available evidence. Asking the patient, friends/relatives and nurses are the usual sources, but direct observation and common sense are also important. However direct testing is not needed. 5. Usually the patient's performance over the preceding 24-48 hours is important, but occasionally longer periods will be relevant. 6. Middle categories imply that the patient supplies over 50 per cent of the effort. 7. Use of aids to be independent is allowed. Leona Herzog., Barthel, D.W. (5756). Functional evaluation: the Barthel Index. [facility] (14)2. KAREN Cantor, Melonie Ramos., Marcus Mcgrath., [place], [address] [place] [address] (). Measuring the change indisability after inpatient rehabilitation; comparison of the responsiveness of the Barthel Index and Functional [place] Measure. Journal of Neurology, Neurosurgery, and Psychiatry, 66(4), 412-606. COCO Love, KIMANI Biggs, & Deane Saint, M.A. (2004.) Assessment of post-stroke quality of life in cost-effectiveness studies: The usefulness of the Barthel Index and the EuroQoL-5D. [facility], 13, 698-53 Pain Rating: 
R UE but no number provided Activity Tolerance:  
requires frequent rest breaks Vitals:  
 20 1000 20 1115 20 1203 20 1215 BP:  115/76 113/75 117/69 BP 1 Location:  Left arm BP Patient Position:  Supine Supine Supine; Post activity Pulse: 92 95 (!) 101 (!) 111 Resp:  14 21 26 Temp:  98 °F (36.7 °C) SpO2:  96% 98% 95% Weight:      
 
 
Please refer to the flowsheet for vital signs taken during this treatment. After treatment patient left in no apparent distress:   
Supine in bed, Call bell within reach and Side rails x 3 
 
COMMUNICATION/EDUCATION:  
The patients plan of care was discussed with: Registered nurse. Home safety education was provided and the patient/caregiver indicated understanding., Patient/family have participated as able in goal setting and plan of care. and Patient/family agree to work toward stated goals and plan of care. This patients plan of care is appropriate for delegation to [facility]. Thank you for this referral. 
Vel Christine Time Calculation: 31 mins

## 2020-09-28 NOTE — ROUTINE PROCESS
TRANSFER - OUT REPORT: 
 
Verbal report given to Denise Harrison RN(name) on Otto Car  being transferred to NSTU(unit) for routine progression of care Report consisted of patients Situation, Background, Assessment and  
Recommendations(SBAR). Information from the following report(s) SBAR, Kardex, ED Summary, [facility] and Recent Results was reviewed with the receiving nurse. Lines:  
Peripheral IV 09/27/20 Left Antecubital (Active) Site Assessment Clean, dry, & intact 09/28/20 1000 Phlebitis Assessment 0 09/28/20 1000 Infiltration Assessment 0 09/28/20 1000 Dressing Status Clean, dry, & intact 09/28/20 1000 Dressing Type Transparent;Tape 09/28/20 1000 Hub Color/Line Status Pink; Infusing 09/28/20 1000 Opportunity for questions and clarification was provided. Patient transported with: 
 Brilig

## 2020-09-28 NOTE — PROGRESS NOTES
Care Management:   
Transition of Care Plan: · RUR: not listed · Disposition: home with [facility] (PT, OT, SN, aid) · Transportation: family Spoke with patient in room. Upon entering the room, patient was asleep. Asked if she felt like speaking with CM to answer a few questions. Patient said no. Asked if CM could speak with her son. She said yes. Called only number on chart which was for daughter-in-law Joellyn Holstein ([phone]). She is  to patient's middle son Juwan Polk ([phone]). Isacc Velasco said address listed in [address] [place] [address] is patient's home address. Patient fell about a month ago. She hurt her right shoulder. She went to [facility] for 3 weeks. She was discharged on 9-22-20 to son and daughter-in-law's house at [address], [place], [address] [facility]. She is open to [address] [facility]. She has been seen by PT and SN once each. OT was scheduled to see her today. She also had an order for an aid. Son's home has steps to enter the home, but son has been taking her up them in a wheelchair. Isacc Velasco has been providing care for patient. She assists patient with bathing and dressing. Patient is able to get up and walk to bathroom with assistance of Isacc Velasco. Patient does use the wheelchair in and out of the house. Patient does come to table to eat with family even if she only drinks Ensure. Patient is incontinent at times, especially at night. Patient prefers to walk to bathroom to use the bedside commode instead of having it by her bed. Patient does wear dentures, but has said they are not fitting her correctly at this time. Patient saw her PCP last week and is scheduled to have a virtual visit 9-29-20. Spoke with patient's sons Stephane Shin (middle son), who then conferenced in Myla Romero (oldest son). Patient's youngest son is Nilda Willett. Discussed AMD (see ACP note).  They would like to speak with patient's MD to get results of tests and find out diagnosis. (CM asked hospitalist to call one of the sons.) Observation notice provided in writing to patient and/or caregiver as well as verbal explanation of the policy. Patients who are in outpatient status also receive the Observation notice. 11:45 AM Spoke with Alecia Chang from [address] She will check to see which liaison is following patient and will update them about patient's status. DME: walker, cane, wheelchair, bedside commode ADLs: Patient needs assist with most all of her ADLs. Pharmacy: [facility] Previous HH/SNF/rehab: [address] (open), [facility] Insurance: [facility] Contacts:  
Primary Decision Maker: Isela Bah - Son - [phone] Secondary Decision Maker: Diana Saenz - Son - [phone] 
  
  
Reason for Admission:   TIA 
                
RUR Score:          NA 
        
Plan for utilizing home health:      Patient was open to [address] for PT, OT, SN, and aid. She was seen by PT and SN and OT was to come out today. Family would like for this to resume at discharge. PCP: First and Last name:  Dr. Martin Savage 
 Name of Practice:  
 Are you a current patient: Yes/No: yes Approximate date of last visit: 9- Can you participate in a virtual visit with your PCP: yes Current Advanced Directive/Advance Care Plan: On file. See ACP note dated today. Care Management Interventions PCP Verified by CM: Yes(Dr. Martin Savage) Last Visit to PCP: 09/24/20 Palliative Care Criteria Met (RRAT>21 & CHF Dx)?: No 
Mode of Transport at Discharge: Other (see comment) Transition of Care Consult (CM Consult): Home Health [address] Yes Discharge Durable Medical Equipment: Yes Physical Therapy Consult: Yes Occupational Therapy Consult: Yes Speech Therapy Consult: Yes Current Support Network: Lives with Caregiver Confirm Follow Up Transport: Family The Plan for Transition of Care is Related to the Following Treatment Goals : Home Health(Patient was already open to [facility] - INPATIENT.) The Patient and/or Patient Representative was Provided with a Choice of Provider and Agrees with the Discharge Plan?: Yes Name of the Patient Representative Who was Provided with a Choice of Provider and Agrees with the Discharge Plan: Alan Awad Freedom of Choice List was Provided with Basic Dialogue that Supports the Patient's Individualized Plan of Care/Goals, Treatment Preferences and Shares the Quality Data Associated with the Providers?: No 
 Resource Information Provided?: No 
Discharge Location Discharge Placement: Home [facility]

## 2020-09-28 NOTE — PROGRESS NOTES
Bedside and Verbal shift change report given to [address] (oncoming nurse) by Holger Alcaraz (offgoing nurse). Report included the following information SBAR, Kardex, Intake/Output and MAR.

## 2020-09-28 NOTE — ED NOTES
Bedside and Verbal shift change report given to [address] [place] [address] (oncoming nurse) by [employer] (offgoing nurse). Report included the following information SBAR, Kardex, ED Summary, [facility] and Recent Results. Report immediately called to floor as bed assigned.

## 2020-09-28 NOTE — CONSULTS
Neurology Consult Note Caroline Schrader NP Date of admission: 9/27/2020 Patient: Tony Elliott MRN: [id number]  SSN: [id number] YOB: 1927  Age: 80 y.o. Sex: female Chief complaint: Tremor of all extremities Subjective: HPI: Tony Elliott is a 80 y.o. female with a history of atrial fibrillation on Eliquis 2.5 mg bid, albuterol treatments, hyperlipidemia, coronary disease. Patient previously was living independently until a little less than 4 weeks ago when she fell and fractured her right humerus. She had an open reduction and internal fixation surgery. She has been living with her son. She is using a wheelchair and sometimes holds onto a family member and walks. She is taking her Eliquis twice a day. 9/26, patient had a tremor of all extremities. She also had slurred speech. It was not clearly a rigor and she has no knowledge of having a fever. Apparently, EMS thought the patient had right facial droop but family did not notice that and she does not have any facial symptoms or signs in the ER. Patient reported that this is her third time within the past few days having recurrent symptoms, was in same ED on 9/24 with slurred speech and tremors of her extremities. She had CT/CTA/CTP at that time which were negative for acute findings. Labs significant for UTI, TSH 8.34, Free T4 1.3, WBC 7.8. Vitals with transient hypothermia in ED of 95 degrees. Repeated this visit, CTH, CTP and CTA H&N unremarkable. MRI without evidence of acute ischemia, mild to moderate chronic small vessel ischemic disease. Interval 09/28/20:  
Pt feels back to baseline, though very tired. Pt reports decline in strength even prior to humerus fx, though likely not the best historian. Pt with non-focal neurological exam. Mild dysarthria likely baseline. No acute stroke on MRI. Tremors likely rigors or chills from hypothermia. UTI being treated. Past Medical History: Diagnosis Date  CAD (coronary artery disease)  Gastrointestinal disorder History reviewed. No pertinent surgical history. History reviewed. No pertinent family history. Social History Tobacco Use  Smoking status: Never Smoker  Smokeless tobacco: Never Used Substance Use Topics  Alcohol use: Not Currently Prior to Admission medications Medication Sig Start Date End Date Taking? Authorizing Provider  
multivitamins-minerals-lutein (MEN'S CENTRUM SILVER WITH LUTEIN) tab tablet Take 1 Tab by mouth daily. Tariq Edwards MD  
acetaminophen (TYLENOL) 500 mg tablet Take 500 mg by mouth every eight (8) hours. Tariq Edwards MD  
apixaban (Eliquis) 2.5 mg tablet Take 2.5 mg by mouth two (2) times a day. Tariq Edwards MD  
metoprolol tartrate (LOPRESSOR) 50 mg tablet Take 50 mg by mouth every evening. Tariq Edwards MD  
pravastatin (PRAVACHOL) 10 mg tablet Take 10 mg by mouth nightly. Tariq Edwards MD  
albuterol (PROVENTIL HFA, VENTOLIN HFA, PROAIR HFA) 90 mcg/actuation inhaler Take 1 Puff by inhalation three (3) times daily as needed for Shortness of Breath. Tariq Edwards MD  
amiodarone (CORDARONE) 200 mg tablet Take 200 mg by mouth daily. Tariq Edwards MD  
calcium carbonate (TUMS) 200 mg calcium (500 mg) chew Take 1 Tab by mouth two (2) times a day. Tariq Edwards MD  
timolol (TIMOPTIC) 0.5 % ophthalmic solution Administer 1 Drop to both eyes two (2) times a day. Tariq Edwards MD  
latanoprost (XALATAN) 0.005 % ophthalmic solution Administer 1 Drop to both eyes nightly. Tariq Edwards MD  
 
Current Facility-Administered Medications Medication Dose Route Frequency Provider Last Rate Last Dose  cefTRIAXone (ROCEPHIN) 1 g in 0.9% sodium chloride (MBP/ADV) 50 mL  1 g IntraVENous Q24H Marybel Ram  mL/hr at 09/28/20 0901 1 g at 09/28/20 0901  0.9% sodium chloride infusion  100 mL/hr IntraVENous CONTINUOUS Stacey Garcia  mL/hr at 09/27/20 2303 100 mL/hr at 09/27/20 2303  amiodarone (CORDARONE) tablet 200 mg  200 mg Oral DAILY Vahe Dave MD   200 mg at 09/28/20 2454  apixaban (ELIQUIS) tablet 2.5 mg  2.5 mg Oral BID Stacey Garcia MD   2.5 mg at 09/28/20 2171  latanoprost (XALATAN) 0.005 % ophthalmic solution 1 Drop  1 Drop Both Eyes QHS Vahe Dave MD      
 metoprolol tartrate (LOPRESSOR) tablet 50 mg  50 mg Oral QPM Stacey Garcia MD   50 mg at 09/27/20 2301  pravastatin (PRAVACHOL) tablet 10 mg  10 mg Oral QHS Stacey Garcia MD   10 mg at 09/27/20 2301  timolol (TIMOPTIC) 0.5 % ophthalmic solution 1 Drop  1 Drop Both Eyes BID Vahe Dave MD      
 acetaminophen (TYLENOL) tablet 500 mg  500 mg Oral Q8H Vahe Dave MD      
 acetaminophen (TYLENOL) tablet 650 mg  650 mg Oral Q4H PRN Vahe Dave MD      
 Or  
 acetaminophen (TYLENOL) solution 650 mg  650 mg Per NG tube Q4H PRN Vahe Dave MD      
 Or  
 acetaminophen (TYLENOL) suppository 650 mg  650 mg Rectal Q4H PRN Vahe Dave MD      
 aspirin chewable tablet 81 mg  81 mg Oral DAILY tSacey Garcia MD   81 mg at 09/28/20 3757  labetaloL (NORMODYNE;TRANDATE) injection 5 mg  5 mg IntraVENous Q10MIN PRN Vahe Dave MD      
 
Current Outpatient Medications Medication Sig Dispense Refill  multivitamins-minerals-lutein (MEN'S CENTRUM SILVER WITH LUTEIN) tab tablet Take 1 Tab by mouth daily.  acetaminophen (TYLENOL) 500 mg tablet Take 500 mg by mouth every eight (8) hours.  apixaban (Eliquis) 2.5 mg tablet Take 2.5 mg by mouth two (2) times a day.  metoprolol tartrate (LOPRESSOR) 50 mg tablet Take 50 mg by mouth every evening.  pravastatin (PRAVACHOL) 10 mg tablet Take 10 mg by mouth nightly.  albuterol (PROVENTIL HFA, VENTOLIN HFA, PROAIR HFA) 90 mcg/actuation inhaler Take 1 Puff by inhalation three (3) times daily as needed for Shortness of Breath.  amiodarone (CORDARONE) 200 mg tablet Take 200 mg by mouth daily.  calcium carbonate (TUMS) 200 mg calcium (500 mg) chew Take 1 Tab by mouth two (2) times a day.  timolol (TIMOPTIC) 0.5 % ophthalmic solution Administer 1 Drop to both eyes two (2) times a day.  latanoprost (XALATAN) 0.005 % ophthalmic solution Administer 1 Drop to both eyes nightly. Allergies Allergen Reactions  Ambien [Zolpidem] Unknown (comments)  Fosamax [Alendronate] Unknown (comments) Review of systems Comprehensive review of systems performed and negative except for as documented above. Objective:  
 
Vitals:  
 20 0500 20 0600 20 0700 20 0800 BP: 122/76 129/87 (!) 146/85 (!) 149/90 Pulse: 100 (!) 102 (!) 107 (!) 108 Resp: (!) 3 17 18 21 Temp:    97.8 °F (36.6 °C) SpO2: 98% 97% 96% 96% Temp (24hrs), Av °F (36.1 °C), Min:95 °F (35 °C), Max:98.3 °F (36.8 °C) O2 Device: Room air No intake or output data in the 24 hours ending 20 0956 General: In NAD. Cardiac: Afib ~ 100 Lungs: Unlabored breathing. Abdomen: Soft/NT/non-distended. : No Murphy. Otherwise deferred Extremities. No edema. Neurologic Exam: 
Mental Status:  Somnolent. Easily aroused with voice. Oriented x 3: year 2019, otherwise month, place, situation correct. Calm. Cooperative. Language:    Normal fluency and comprehension Cranial Nerves:   Pupils equal, round and reactive to light. Visual fields intact. Extraocular movements intact w/o nystagmus Facial sensation intact to LT Facial activation symmetric. Hearing intact bilaterally. Mild dysarthria. Shoulder shrug 5/5 bilaterally.  
 
Motor:    Bulk and tone normal.  
 Generalized weakness. 4/5 strength in all extremities, R arm exam limited by sling and recent fx. Strong  bilaterally. No involuntary movements. Sensation:    Sensation intact throughout to light touch. Coordination & Gait: No ataxia with finger to nose. Gait deferred LABS: 
Recent Labs  
  09/28/20 0442 09/27/20 1722 09/26/20 
7519 WBC 7.8 8.9 9.4 HGB 13.4 12.7 13.4 HCT 39.7 38.1 40.7  270 278 Recent Labs  
  09/28/20 0442 09/27/20 1722 09/26/20 
5715 * 131* 132* K 4.1 4.3 3.9  101 100 CO2 27 26 27 BUN 13 17 18 CREA 0.84 1.00 1.13* * 134* 157* CA 8.5 8.6 8.7 Recent Labs  
  09/27/20 1722 09/26/20 
9384 ALT 25 24 AP 96 95 TBILI 0.8 1.3* TP 6.2* 6.7 ALB 2.5* 2.8*  
GLOB 3.7 3.9 Recent Labs  
  09/27/20 1722 09/26/20 
9263 INR 1.1 1.2* PTP 11.5* 11.8* No results for input(s): PHI, PCO2I, PO2I, HCO3I in the last 72 hours. Recent Labs  
  09/27/20 1722 TROIQ <0.05 Lab Results Component Value Date/Time Cholesterol, total 123 09/28/2020 04:42 AM  
 HDL Cholesterol 53 09/28/2020 04:42 AM  
 LDL, calculated 55.4 09/28/2020 04:42 AM  
 Triglyceride 73 09/28/2020 04:42 AM  
 CHOL/HDL Ratio 2.3 09/28/2020 04:42 AM  
 
No results found for: Leydi Colon Lab Results Component Value Date/Time  Color YELLOW/STRAW 09/27/2020 11:10 PM  
 Appearance CLOUDY (A) 09/27/2020 11:10 PM  
 Specific gravity <1.005 09/27/2020 11:10 PM  
 pH (UA) 7.5 09/27/2020 11:10 PM  
 Protein TRACE (A) 09/27/2020 11:10 PM  
 Glucose Negative 09/27/2020 11:10 PM  
 Ketone Negative 09/27/2020 11:10 PM  
 Bilirubin Negative 09/27/2020 11:10 PM  
 Urobilinogen 1.0 09/27/2020 11:10 PM  
 Nitrites Positive (A) 09/27/2020 11:10 PM  
 Leukocyte Esterase MODERATE (A) 09/27/2020 11:10 PM  
 Epithelial cells FEW 09/27/2020 11:10 PM  
 Bacteria 3+ (A) 09/27/2020 11:10 PM  
 WBC >100 (H) 09/27/2020 11:10 PM  
 RBC 10-20 09/27/2020 11:10 PM  
 
 
 No results for input(s): FE, TIBC, PSAT, FERR in the last 72 hours. No results found for: FOL, RBCF No results for input(s): PH, PCO2, PO2 in the last 72 hours. Recent Labs  
  09/27/20 
1722 TROIQ <0.05 Imaging: 
Xr Shoulder Rt 1v Result Date: 9/27/2020 IMPRESSION: Recent ORIF right humerus as above, with no acute process. Cta Code Neuro Head And Neck W Cont Result Date: 9/27/2020 IMPRESSION: No acute large vessel occlusion. No change since MRI and CT examination of yesterday. Ct Code Neuro Head Wo Contrast 
 
Result Date: 9/27/2020 IMPRESSION: No acute changes. Ct Code Neuro Perf W Cbf Result Date: 9/27/2020 IMPRESSION: No acute large vessel occlusion. No change since MRI and CT examination of yesterday. Mri Code Neuro Brain Wo Cont Result Date: 9/27/2020 IMPRESSION: Chronic white matter disease with no acute infarction. CT Results: 
Results from Hospital Encounter encounter on 09/27/20 CT CODE NEURO PERF W CBF Narrative INDICATION: cva EXAMINATION:  CT ANGIOGRAPHY HEAD AND NECK COMPARISON: CTA September 26, 2020 and brain MRI September 26, 2020 TECHNIQUE:  Following the uneventful administration of  intravenous contrast, 
axial CT angiography of the head and neck was performed. 3D image 
postprocessing was performed. CT dose reduction was achieved through use of a 
standardized protocol tailored for this examination and automatic exposure 
control for dose modulation. Cerebral perfusion analysis using computed tomography with contrast 
administration, including post processing of parametric maps with the 
termination of cerebral blood flow, cerebral blood volume, and mean transit 
time. FINDINGS: 
 
CTA NECK Aortic Arch: Patent. Right Common Carotid Artery: Moderate calcified plaque at the bifurcation 
without significant stenosis. Right Internal Carotid Artery: Moderate calcified plaque along the posterior wall of the internal carotid artery origin and proximal cervical segment without 
significant stenosis. NASCET Right: 0-25%. Left Common Carotid Artery: Mild calcific plaque in the bifurcation without 
significant stenosis. Left Internal Carotid Artery: Moderate calcific plaque at the origin and 
proximal cervical segment without significant stenosis. NASCET Left: 0-25%. Carotid stenosis determined using NASCET criteria. Right Vertebral Artery: Patent. Left Vertebral Artery: Patent. Cervical Soft Tissues: 2.6 cm cystic nodule right thyroid lobe. Lung Apices: Small pleural effusions and mild interstitial edema partly 
visualized in the lung apices. Biapical pleural and parenchymal scarring. Bones: No destructive bone lesion. Additional Comments: N/A. CTA HEAD Posterior Circulation: No flow limiting stenosis or occlusion. Anterior Circulation: No flow limiting stenosis or occlusion. Additional Comments: Probable small infundibulum left supraclinoid internal 
carotid artery at the expected location of the posterior communicating artery, 
unchanged. CT PERFUSION: 
There are no regional areas of elevated Tmax, decreased cerebral blood flow or 
blood volume. RCBF < 30% = 0 cc. Tmax > 6 seconds = 0 cc. Mismatch volume = 0 cc. Impression IMPRESSION: 
No acute large vessel occlusion. No change since MRI and CT examination of 
yesterday. CTA CODE NEURO HEAD AND NECK W CONT Narrative INDICATION: cva EXAMINATION:  CT ANGIOGRAPHY HEAD AND NECK COMPARISON: CTA September 26, 2020 and brain MRI September 26, 2020 TECHNIQUE:  Following the uneventful administration of  intravenous contrast, 
axial CT angiography of the head and neck was performed. 3D image 
postprocessing was performed. CT dose reduction was achieved through use of a 
standardized protocol tailored for this examination and automatic exposure 
control for dose modulation. Cerebral perfusion analysis using computed tomography with contrast 
administration, including post processing of parametric maps with the 
termination of cerebral blood flow, cerebral blood volume, and mean transit 
time. FINDINGS: 
 
CTA NECK Aortic Arch: Patent. Right Common Carotid Artery: Moderate calcified plaque at the bifurcation 
without significant stenosis. Right Internal Carotid Artery: Moderate calcified plaque along the posterior 
wall of the internal carotid artery origin and proximal cervical segment without 
significant stenosis. NASCET Right: 0-25%. Left Common Carotid Artery: Mild calcific plaque in the bifurcation without 
significant stenosis. Left Internal Carotid Artery: Moderate calcific plaque at the origin and 
proximal cervical segment without significant stenosis. NASCET Left: 0-25%. Carotid stenosis determined using NASCET criteria. Right Vertebral Artery: Patent. Left Vertebral Artery: Patent. Cervical Soft Tissues: 2.6 cm cystic nodule right thyroid lobe. Lung Apices: Small pleural effusions and mild interstitial edema partly 
visualized in the lung apices. Biapical pleural and parenchymal scarring. Bones: No destructive bone lesion. Additional Comments: N/A. CTA HEAD Posterior Circulation: No flow limiting stenosis or occlusion. Anterior Circulation: No flow limiting stenosis or occlusion. Additional Comments: Probable small infundibulum left supraclinoid internal 
carotid artery at the expected location of the posterior communicating artery, 
unchanged. CT PERFUSION: 
There are no regional areas of elevated Tmax, decreased cerebral blood flow or 
blood volume. RCBF < 30% = 0 cc. Tmax > 6 seconds = 0 cc. Mismatch volume = 0 cc. Impression IMPRESSION: 
No acute large vessel occlusion. No change since MRI and CT examination of 
yesterday. CT CODE NEURO HEAD WO CONTRAST Narrative EXAM: CT CODE NEURO HEAD WO CONTRAST INDICATION: Code Stroke slurred speech  onset weakness COMPARISON: September 26. CONTRAST: None. TECHNIQUE: Unenhanced CT of the head was performed using 5 mm images. Brain and 
bone windows were generated. Coronal and sagittal reformats. CT dose reduction 
was achieved through use of a standardized protocol tailored for this 
examination and automatic exposure control for dose modulation. FINDINGS: 
There is atrophy and nonspecific white matter changes stable. There is no 
extra-axial fluid collection hemorrhage shift or masses. Impression IMPRESSION: No acute changes. MRI Results: 
Results from Hospital Encounter encounter on 09/27/20 MRI CODE NEURO BRAIN WO CONT Narrative INDICATION:   cva EXAMINATION:  MRI BRAIN WO CONTRAST 
 
COMPARISON:  CT earlier today TECHNIQUE:  Multiplanar multisequence acquisition without contrast of the brain. FINDINGS:   
 
Ventricles:  Midline, no hydrocephalus. Brain Parenchyma/Brainstem:  Mild chronic white matter disease throughout the 
supratentorial brain. Mild generalized atrophy. No acute infarction. Intracranial Hemorrhage:  None. Basal Cisterns:  Normal.  
Flow Voids:  Normal. 
Additional Comments:  N/A. Impression IMPRESSION: 
Chronic white matter disease with no acute infarction. Results from [facility] [place] Encounter encounter on 09/26/20 MRI BRAIN WO CONT Narrative EXAM: MRI BRAIN WO CONT INDICATION: Episode of slurred speech today COMPARISON: None. CONTRAST: None. TECHNIQUE:   
Multiplanar multisequence acquisition without contrast of the brain. FINDINGS: 
Evaluation is limited by motion and artifact from dental hardware. . Ventricles 
are normal in size. Subcortical and deep white matter T2 hyperintense foci are 
present. No evidence of acute hemorrhage.  Vague area of increased signal on 
diffusion-weighted imaging the central midbrain is likely artifactual given the 
 extensive motion. No definite diffusion restriction is present. No mass effect 
or midline shift, or cerebral edema. There is no cerebellar tonsillar 
herniation. Expected arterial flow-voids are present. The paranasal sinuses, mastoid air cells, and middle ears are clear. The orbital 
contents are within normal limits. No significant osseous or scalp lesions are 
identified. Impression IMPRESSION:  
Limitations as above. No evidence of acute ischemia. Mild to moderate chronic 
small vessel ischemic disease XR Results [place] [facility] [place] XR SHOULDER RT 1V Impression IMPRESSION: 
Recent ORIF right humerus as above, with no acute process. [place] [facility] [place]20 XR CHEST PORT Impression IMPRESSION: Stable chest x-ray appearance [place] [facility] [place]20 XR CHEST PA LAT Impression IMPRESSION: Cardiomegaly and small left pleural effusion. No pneumonia or 
pneumothorax. ORIF of right proximal humeral fracture. Please see full report. VAS/US Results (maximum last 3): No results found for this or any previous visit. TTE 
  
 
 
EKG Results for orders placed or performed during the hospital encounter of 09/27/20 EKG, 12 LEAD, INITIAL Result Value Ref Range Ventricular Rate 102 BPM  
 Atrial Rate 156 BPM  
 QRS Duration 96 ms  
 Q-T Interval 382 ms QTC Calculation (Bezet) 497 ms Calculated R Axis 37 degrees Calculated T Axis 20 degrees Diagnosis Atrial fibrillation Nonspecific ST abnormality When compared with ECG of 26-SEP-2020 09:34, 
MANUAL COMPARISON REQUIRED, DATA IS UNCONFIRMED Hospital Problems  Never Reviewed Codes Class Noted POA  
 TIA (transient ischemic attack) ICD-10-CM: G45.9 ICD-9-CM: 435.9  9/27/2020 Unknown Assessment/Plan:  
 
Tristian Ayala is a 80 y.o. female  atrial fibrillation on Eliquis 2.5 mg bid, albuterol treatments, hyperlipidemia, coronary disease. Patient fell and fractured her right humerus about 4 weeks ago. S/p ORIF. She has been living with her son. She is using a wheelchair and sometimes holds onto a family member and walks. She presented on 9/24 and 9/26, with similar symptoms. W/u 9/24 w/o MRI or urinalysis. Patient reported that this is her third time within the past few days having recurrent symptoms. CT/CTA/CTP 9/26 and 9/24 were negative for acute findings. Labs 9/26 significant for UTI, TSH 8.34, Free T4 1.3, WBC 7.8. Vitals with transient hypothermia in ED of 95 degrees. MRI without evidence of acute ischemia, mild to moderate chronic small vessel ischemic disease. MRI brain on 9/26 and 9/27 both without acute ischemia. Pt feels back to baseline, though very tired. Pt reports decline in strength even prior to humerus fx, though likely not the best historian. Pt with non-focal neurological exam. Mild dysarthria likely baseline. No acute stroke on MRI. Tremors likely UTI related rigors and/or chills from hypothermia. 
 
- LDL 55 - continue home pravastatin 
- A1c 5.6 
- Continue home apixaban 2.5 mg bid - Treat UTI per primary She can discharge from neurological perspective. Thank you for this consult. Signed By: Felisha Hays NP  September 28, 2020 9:56 AM

## 2020-09-28 NOTE — H&P
History and Physical 
                                               
 
Primary Care Provider: Nadia Torres MD 
 
 
NAME: Mady Ferris :  5/15/1927 MRN:  [id number] Date/Time:  2020 8:03 PM 
 
Patient PCP: Nadia Torres MD 
 
Subjective:  
 
 
Mady Ferris is a 80 y.o. female who presents with Slurred speech and weakness. Patient was seen and ER yesterday for she was worked up including had an MRI which was negative for acute stroke. She was discharged home and she presents back with persistent slurred speech and weakness. There was no report of facial asymmetry or droop, focal hand or leg weakness. There is more like generalized weakness. While in the ER blood sugar per EMS was 177. Vital signs in ED showed temperature 98.3 pulse rate of 100 respiratory 23 pressure 114/71 O2 sats 97% on room air When she was in the ED, EKG showed A. fib rate controlled She had a CT scan code neuro which was negative acute stroke or large vessel occlusion She was deemed not a tpa candidate per tele neuro. An MRI of the brain has been ordered and is pending at this time I saw the patient she was in the room with her son. She feels like nothing is wrong with her. She had a recent right shoulder fracture for which she had been to rehab. She reports that the right shoulder still hurtful and also she is having nausea associated with this. Pts son reports that she never had stroke in past. Denies Diabetes history. Denies fevers or chills. Patient allows participation of the people present in the room to discuss their medical care. Review of Systems: Total of 12 systems reviewed as follows:   
Review of Systems Constitutional: Negative. HENT: Negative. Eyes: Negative. Respiratory: Negative. Cardiovascular: Negative. Gastrointestinal: Negative. Genitourinary: Negative. Musculoskeletal: Negative. Skin: Negative. Neurological: Positive for dizziness and speech change. Endo/Heme/Allergies: Negative. Psychiatric/Behavioral: Negative. Past Medical History:  
Diagnosis Date  CAD (coronary artery disease)  Gastrointestinal disorder History reviewed. No pertinent surgical history. Prior to Admission medications Medication Sig Start Date End Date Taking? Authorizing Provider  
multivitamins-minerals-lutein (MEN'S CENTRUM SILVER WITH LUTEIN) tab tablet Take 1 Tab by mouth daily. Suze Mcgraw MD  
acetaminophen (TYLENOL) 500 mg tablet Take 500 mg by mouth every eight (8) hours. Suze Mcgraw MD  
apixaban (Eliquis) 2.5 mg tablet Take 2.5 mg by mouth two (2) times a day. Suze Mcgraw MD  
metoprolol tartrate (LOPRESSOR) 50 mg tablet Take 50 mg by mouth every evening. Suze Mcgraw MD  
pravastatin (PRAVACHOL) 10 mg tablet Take 10 mg by mouth nightly. Suze Mcgraw MD  
albuterol (PROVENTIL HFA, VENTOLIN HFA, PROAIR HFA) 90 mcg/actuation inhaler Take 1 Puff by inhalation three (3) times daily as needed for Shortness of Breath. Suze Mcgraw MD  
amiodarone (CORDARONE) 200 mg tablet Take 200 mg by mouth daily. Suze Mcgraw MD  
calcium carbonate (TUMS) 200 mg calcium (500 mg) chew Take 1 Tab by mouth two (2) times a day. Suze Mcgraw MD  
timolol (TIMOPTIC) 0.5 % ophthalmic solution Administer 1 Drop to both eyes two (2) times a day. Suze Mcgraw MD  
latanoprost (XALATAN) 0.005 % ophthalmic solution Administer 1 Drop to both eyes nightly. Suze Mcgraw MD  
 
Allergies Allergen Reactions  Ambien [Zolpidem] Unknown (comments)  Fosamax [Alendronate] Unknown (comments) History reviewed. No pertinent family history. SOCIAL HISTORY: 
Patient resides at Home. Patient ambulates with Abcam. Smoking history: never Alcohol history: Denies Objective:  
 
 
Physical Exam:  
Visit Vitals /77 Pulse (!) 105 Temp 98.3 °F (36.8 °C) Resp 20 Wt 55.7 kg (122 lb 12.7 oz) SpO2 96% BMI 22.46 kg/m² General:  Alert, cooperative, no distress, appears stated age. Head:  Normocephalic, without obvious abnormality, atraumatic. Eyes:  Conjunctivae/corneas clear. PERRL, EOMs intact. Fundi benign. Ears:  Normal TMs and external ear canals both ears. Nose: Nares normal. Septum midline. Mucosa normal. No drainage or sinus tenderness. Throat: Lips, mucosa, and tongue normal. Teeth and gums normal.  
Neck: Supple, symmetrical, trachea midline, no adenopathy, thyroid: no enlargement/tenderness/nodules, no carotid bruit and no JVD. Back:   Symmetric, no curvature. ROM normal. No CVA tenderness. Lungs:   Clear to auscultation bilaterally. Chest wall:  No tenderness or deformity. Heart:  Regular rate and rhythm, S1, S2 normal, no murmur, click, rub or gallop. Abdomen:   Soft, non-tender. Bowel sounds normal. No masses,  No organomegaly. Extremities: Extremities normal, atraumatic, no cyanosis or edema. Right hip in sling Pulses: 2+ and symmetric all extremities. Skin: Skin color, texture, turgor normal. No rashes or lesions. Lymph nodes: Cervical, supraclavicular, and axillary nodes normal.  
Neurologic: CNII-XII intact. Normal strength, sensation and reflexes throughout. Cap refill: Brisk, less than 3 seconds Pulses: 2+, symmetric in all extremities Data Review:  
 
Recent Results (from the past 24 hour(s)) CBC WITH AUTOMATED DIFF Collection Time: 09/27/20  5:22 PM  
Result Value Ref Range WBC 8.9 3.6 - 11.0 K/uL  
 RBC 3.84 3.80 - 5.20 M/uL  
 HGB 12.7 11.5 - 16.0 g/dL HCT 38.1 35.0 - 47.0 % MCV 99.2 (H) 80.0 - 99.0 FL  
 MCH 33.1 26.0 - 34.0 PG  
 MCHC 33.3 30.0 - 36.5 g/dL  
 RDW 17.2 (H) 11.5 - 14.5 % PLATELET 525 842 - 562 K/uL MPV 10.6 8.9 - 12.9 FL  
 NRBC 0.0 0  WBC ABSOLUTE NRBC 0.00 0.00 - 0.01 K/uL NEUTROPHILS 71 32 - 75 % LYMPHOCYTES 12 12 - 49 % MONOCYTES 12 5 - 13 % EOSINOPHILS 3 0 - 7 % BASOPHILS 1 0 - 1 % IMMATURE GRANULOCYTES 1 (H) 0.0 - 0.5 % ABS. NEUTROPHILS 6.4 1.8 - 8.0 K/UL  
 ABS. LYMPHOCYTES 1.1 0.8 - 3.5 K/UL  
 ABS. MONOCYTES 1.1 (H) 0.0 - 1.0 K/UL  
 ABS. EOSINOPHILS 0.3 0.0 - 0.4 K/UL  
 ABS. BASOPHILS 0.1 0.0 - 0.1 K/UL  
 ABS. IMM. GRANS. 0.1 (H) 0.00 - 0.04 K/UL  
 DF AUTOMATED METABOLIC PANEL, COMPREHENSIVE Collection Time: 09/27/20  5:22 PM  
Result Value Ref Range Sodium 131 (L) 136 - 145 mmol/L Potassium 4.3 3.5 - 5.1 mmol/L Chloride 101 97 - 108 mmol/L  
 CO2 26 21 - 32 mmol/L Anion gap 4 (L) 5 - 15 mmol/L Glucose 134 (H) 65 - 100 mg/dL BUN 17 6 - 20 MG/DL Creatinine 1.00 0.55 - 1.02 MG/DL  
 BUN/Creatinine ratio 17 12 - 20 GFR est AA >60 >60 ml/min/1.73m2 GFR est non-AA 52 (L) >60 ml/min/1.73m2 Calcium 8.6 8.5 - 10.1 MG/DL Bilirubin, total 0.8 0.2 - 1.0 MG/DL  
 ALT (SGPT) 25 12 - 78 U/L  
 AST (SGOT) 30 15 - 37 U/L Alk. phosphatase 96 45 - 117 U/L Protein, total 6.2 (L) 6.4 - 8.2 g/dL Albumin 2.5 (L) 3.5 - 5.0 g/dL Globulin 3.7 2.0 - 4.0 g/dL A-G Ratio 0.7 (L) 1.1 - 2.2 PROTHROMBIN TIME + INR Collection Time: 09/27/20  5:22 PM  
Result Value Ref Range INR 1.1 0.9 - 1.1 Prothrombin time 11.5 (H) 9.0 - 11.1 sec TROPONIN I Collection Time: 09/27/20  5:22 PM  
Result Value Ref Range Troponin-I, Qt. <0.05 <0.05 ng/mL SAMPLES BEING HELD Collection Time: 09/27/20  5:22 PM  
Result Value Ref Range SAMPLES BEING HELD 1 RED, 1 BLUE   
 COMMENT Add-on orders for these samples will be processed based on acceptable specimen integrity and analyte stability, which may vary by analyte. EKG, 12 LEAD, INITIAL Collection Time: 09/27/20  5:40 PM  
Result Value Ref Range Ventricular Rate 102 BPM  
 Atrial Rate 156 BPM  
 QRS Duration 96 ms  
 Q-T Interval 382 ms QTC Calculation (Bezet) 497 ms Calculated R Axis 37 degrees Calculated T Axis 20 degrees Diagnosis Atrial fibrillation Nonspecific ST abnormality When compared with ECG of 26-SEP-2020 09:34, 
MANUAL COMPARISON REQUIRED, DATA IS UNCONFIRMED Cta Code Neuro Head And Neck W Cont Result Date: 9/27/2020 IMPRESSION: No acute large vessel occlusion. No change since MRI and CT examination of yesterday. Ct Code Neuro Head Wo Contrast 
 
Result Date: 9/27/2020 IMPRESSION: No acute changes. Ct Code Neuro Perf W Cbf Result Date: 9/27/2020 IMPRESSION: No acute large vessel occlusion. No change since MRI and CT examination of yesterday. Chest x-ray was negative for infiltrate, effusion, pneumothorax, or wide mediastinum. ECG:  atrial fibrillation, rate 90 Assessment:  
 
Given the patient's current clinical presentation, I have a high level of concern for decompensation if discharged from the emergency department. Complex decision making was performed, which includes reviewing the patient's available past medical records, laboratory results, and x-ray films. My assessment of this patient's clinical condition and my plan of care is as follows. Active Problems: 
  TIA (transient ischemic attack) (9/27/2020) Plan:  
Slurred speech MRI yesterday negative for stroke Code Stroke Imaging today negative for stroke MRI pending ? Complex migrainous stroke Check EEG Check A1c Check Echo Check Orthostatics Thyroid cyst 
Unsure of significance of cystic lesion, could be cold nodule Check TSH Chronic Afib Monitor on Tele C/w Amiodrone Could need Zio patch on D/c Elevated Blood sugars on BMP Pt reports no h.o Diabetes Check A1c Prior Rt Shoilder fracture Check Xrays Code Status: DNR Surrogate Decision Maker:Son DVT Prophylaxis: on Eliquis GI Prophylaxis: not indicated FUNCTIONAL STATUS PRIOR TO ADMISSION: Ambulatory with Use of Assistive Devices Patient is from: Home Care Plan discussed with: Patient/Family Anticipated Disposition: SNF/LTC and  PT, OT, RN Anticipated Discharge: 24 hours to 48 hours Patient was explained about the risk associated with hospitalization including (and not a complete) risk of falls,fractures,blood clots,Bleeding from medications (including anticoagulants used for DVT ppx), Sepsis,allergic reactions,infections,radiation risk from the imaging studies performed,transfusions of blood products,Renal failure  and also risk of death. Patient also understands and agrees to the treatment plan including medications and also understand the risk with radiation while undergoing imaging studies. The patient  And  family  (after permission given by the patient) understands the need to be hospitalized and follow medical orders, agree with the admission plan. Thank You Dr General Halle MD for taking care of your patient. Please call if you have any questions. Signed By: Usha Rubalcava MD   
 September 27, 2020 Please note that this dictation was completed with [employer], the computer voice recognition software. Quite often unanticipated grammatical, syntax, homophones, and other interpretive errors are inadvertently transcribed by the computer software. Please disregard these errors. Please excuse any errors that have escaped final proofreading. Thank you.

## 2020-09-29 PROBLEM — N39.0 UTI (URINARY TRACT INFECTION): Status: ACTIVE | Noted: 2020-01-01

## 2020-09-29 NOTE — PROGRESS NOTES
Problem: Falls - Risk of 
Goal: *Absence of Falls Description: Document Chelo Avila Fall Risk and appropriate interventions in the flowsheet. Outcome: Progressing Towards Goal 
Note: Fall Risk Interventions: 
Mobility Interventions: Patient to call before getting OOB, PT Consult for mobility concerns, PT Consult for assist device competence, Strengthening exercises (ROM-active/passive), Bed/chair exit alarm Mentation Interventions: Adequate sleep, hydration, pain control, Bed/chair exit alarm, Door open when patient unattended, Reorient patient Medication Interventions: Teach patient to arise slowly, Bed/chair exit alarm Elimination Interventions: Stay With Me (per policy), Toileting schedule/hourly rounds History of Falls Interventions: Bed/chair exit alarm, Door open when patient unattended Problem: Urinary Tract Infection - Adult Goal: *Absence of infection signs and symptoms Outcome: Progressing Towards Goal

## 2020-09-29 NOTE — PROGRESS NOTES
TRANSFER - OUT REPORT: 
 
Verbal report given to [id number] VANNESSA Serrano RN(name) on Elizabeth Douglass  being transferred to [facility](unit) for routine progression of care Report consisted of patients Situation, Background, Assessment and  
Recommendations(SBAR). Information from the following report(s) SBAR, Kardex, Intake/Output, MAR, Recent Results, Med Rec Status and Cardiac Rhythm A-fib was reviewed with the receiving nurse. Lines:  
Peripheral IV 09/29/20 Left Antecubital (Active) Site Assessment Clean, dry, & intact 09/29/20 1200 Phlebitis Assessment 0 09/29/20 1200 Infiltration Assessment 0 09/29/20 1200 Dressing Status Clean, dry, & intact 09/29/20 1200 Dressing Type Tape;Transparent 09/29/20 1200 Hub Color/Line Status Blue; Infusing 09/29/20 1200 Action Taken Open ports on tubing capped 09/29/20 1200 Alcohol Cap Used Yes 09/29/20 1200 Opportunity for questions and clarification was provided. Patient transported with: 
 Patient-specific medications from Pharmacy Registered Nurse Tech 
 
1554: Son, Lyudmila Lorenzo, notified via telephone @ [phone] of patient transfer.

## 2020-09-29 NOTE — PROGRESS NOTES
Problem: Self Care Deficits Care Plan (Adult) Goal: *Acute Goals and Plan of Care (Insert Text) Description:  
FUNCTIONAL STATUS PRIOR TO ADMISSION: Patient was independent with ADLs. [facility] and R humeral fx with ORIF. The patient was functional at the wheelchair level and HHA for sit<>stand and functional mobility. Patient required moderate assistance for basic and total assist instrumental ADLs. Was about to have aid setup and HHOT when this episode occurred. HOME SUPPORT: The patient lived alone. However, then [facility]. Occupational Therapy Goals Initiated 9/28/2020 1. Patient will perform self-feeding with supervision/set-up within 7 day(s). 2.  Patient will perform grooming with supervision/set-up within 7 day(s). 3.  Patient will perform sitting unsupported ADLs 4 mins with minimal assistance/contact guard assist within 7 day(s). 4.  Patient will perform BSC toilet transfers with moderate assistance  within 7 day(s). 5.  Patient will perform all aspects of toileting with moderate assistance  within 7 day(s). 6.  Patient will perform upper body dressing with max A within 7 days. Outcome: Progressing Towards Goal 
  
OCCUPATIONAL THERAPY TREATMENT Patient: Anup Miller (89 y.o. female) Date: 9/29/2020 Diagnosis: TIA (transient ischemic attack) [G45.9] UTI (urinary tract infection) [N39.0] <principal problem not specified> Precautions: Fall(RUE sling s/p ORIF approx 4 wks ago) Chart, occupational therapy assessment, plan of care, and goals were reviewed. ASSESSMENT Patient continues with skilled OT services and is progressing towards goals. Patient is received in bed with supportive son present. Pt requires increased encouragement to participate with therapy due to report of increased fatigue.  She requires max A to manage UB dressing task and total A to manage R UE sling. Son present reporting pt had been performing AROM at [facility], but no exercises at shoulder to his knowledge. Once pt at EOB, she requires near constant support due to posterior lean/LOB. She is further limited by fear of falling and prefers to hold onto therapist's hand for security. Pt tends to keep eyes mostly closed during session but does participate in some UE ROM (except R elbow) and head/neck ROM. Pt reporting increased fatigue, therefore returned to bed and placed in chair position. Pt will benefit from SNF-level rehab at discharge. Current Level of Function Impacting Discharge (ADLs): max A UB dressing/bathing; max to total A LB dressing and toileting; set up to min A self feeding and grooming; A x 2 for mobility. Other factors to consider for discharge: recent R ORIF with sling; high fall risk; advanced age PLAN : 
Patient continues to benefit from skilled intervention to address the above impairments. Continue treatment per established plan of care. to address goals. Recommend with staff: chair position in bed 3x/day for 60 minutes each time; chair position for meals; encourage participation in ADLs. Recommend next OT session: continue with ADLs as able at EOB; standing trials for ADL transfers Recommendation for discharge: (in order for the patient to meet his/her long term goals) Therapy up to 5 days/week in SNF setting This discharge recommendation: 
Has been made in collaboration with the attending provider and/or case management IF patient discharges home will need the following DME: bedside commode, gait belt and wheelchair SUBJECTIVE:  
Patient stated I am so tired.  OBJECTIVE DATA SUMMARY:  
Cognitive/Behavioral Status: 
Neurologic State: Alert(intermittently drowsy, closing eyes throughout session) Orientation Level: Oriented to person;Oriented to place;Oriented to time Cognition: Decreased attention/concentration Perception: Cues to maintain midline in sitting; Tactile;Verbal;Visual(pt tends to lean or lose balance posteriorly) Perseveration: No perseveration noted Safety/Judgement: Decreased awareness of environment Functional Mobility and Transfers for ADLs: 
Bed Mobility: 
Supine to Sit: Maximum assistance;Assist x2 Sit to Supine: Maximum assistance;Assist x2 Scooting: Maximum assistance Transfers: 
Sit to Stand: (NT) 
 
Balance: 
Sitting: Impaired Sitting - Static: Fair (occasional); Poor (constant support) Sitting - Dynamic: Poor (constant support) Standing: (NT) 
 
ADL Intervention: 
 
Upper Body Dressing Assistance Orthotics(Brace): Total assistance (dependent)(to manage R UE sling) Hospital Gown: Maximum assistance(due to R shoulder immobilized and weak; L UE weak) Cues: Physical assistance; Tactile cues provided;Verbal cues provided;Visual cues provided Cognitive Retraining Safety/Judgement: Decreased awareness of environment Therapeutic Exercises: Pt performs AROM at elbow, wrist, and hand at R UE (R shoulder kept immobilized due to recent ORIF) both in bed and while seated EOB. Pain: 
Pt reporting minimal to no pain in R shoulder Activity Tolerance:  
Fair and Poor Please refer to the flowsheet for vital signs taken during this treatment. After treatment patient left in no apparent distress:  
Call bell within reach, Caregiver / family present, Side rails x 3 and in chair position in bed COMMUNICATION/COLLABORATION:  
The patients plan of care was discussed with: Physical therapist and Registered nurse. Kary Whatley OT Time Calculation: 30 mins

## 2020-09-29 NOTE — PROGRESS NOTES
CM received call from [facility] at [facility]. CM notified plan to fax this CM admission forms for completion and assessment scheduled for later tonight or early tomorrow morning. CM will continue to follow. Maribell Spear RN, BSN Care Management Department

## 2020-09-29 NOTE — PROGRESS NOTES
Primary Nurse Lucille Almazan and Priyanka Alvarez RN performed a dual skin assessment on this patient Impairment noted- see wound doc flow sheet Josue score is 15. Patient have some bruising under her right breast, nonblanchable sacrum, and incision site on right shoulder.

## 2020-09-29 NOTE — PROGRESS NOTES
TRANSFER - IN REPORT: 
 
Verbal report received from [facility]) on Jackye Door  being received from [facility](unit) for routine progression of care Report consisted of patients Situation, Background, Assessment and  
Recommendations(SBAR). Information from the following report(s) SBAR was reviewed with the receiving nurse. Opportunity for questions and clarification was provided. Assessment completed upon patients arrival to unit and care assumed.

## 2020-09-29 NOTE — PROGRESS NOTES
Bedside and Verbal shift change report given to Alfonso Whiting RN (oncoming nurse) by Keyanna Schmitz RN (offgoing nurse). Report included the following information SBAR, Kardex, Recent Results, Cardiac Rhythm A-fib and Quality Measures.

## 2020-09-29 NOTE — PROGRESS NOTES
TRANSITION OF CARE PLAN  
RUR 11% LOW RISK Chart reviewed. SNF placement recommended. Met with patient's son, Magdalena Pac [phone] to introduce self and offer freedom of choice. Provided list of SNF placement per request. Prefers to discuss options with other family members first. Provided contact information to follow-up. Encouraged family to provide at least 3 options for SNF placement. COVID-19 test conducted today; Results pending. Notified Attending MD of plan; Agreeable. CM to follow. LILIA Mitchell,CRM

## 2020-09-29 NOTE — PROGRESS NOTES
Problem: Mobility Impaired (Adult and Pediatric) Goal: *Acute Goals and Plan of Care (Insert Text) Description: FUNCTIONAL STATUS PRIOR TO ADMISSION: Patient was independent and active without the use of DME prior to her shoulder injury (R humeral fx w/ ORIF) about a month ago. Since that time, the patient has relied on a manual wheelchair pushed by caregivers for ambulation. She is able to walk short distances with considerable assistance from caregivers. She was not able to sit unsupported. HOME SUPPORT PRIOR TO ADMISSION: The patient lived alone with no support required prior to her shoulder injury. She discharged from [facility] to her son and daughter in [address] [place] [address]. Caregivers assist with adls and mobility. Patient is open to home health for PT/ OT. Physical Therapy Goals Initiated 9/28/2020 1. Patient will move from supine to sit and sit to supine , scoot up and down, and roll side to side in bed with moderate assistance  within 7 day(s). 2.  Patient will transfer from bed to chair and chair to bed with moderate assistance  using the least restrictive device within 7 day(s). 3.  Patient will perform sit to stand with moderate assistance  within 7 day(s). 4.  Patient will ambulate with moderate assistance  for 25 feet with the least restrictive device within 7 day(s). Outcome: Progressing Towards Goal 
 PHYSICAL THERAPY TREATMENT Patient: Katharine Correia (23 y.o. female) Date: 9/29/2020 Diagnosis: TIA (transient ischemic attack) [G45.9] UTI (urinary tract infection) [N39.0] <principal problem not specified> Precautions: Fall(RUE sling s/p ORIF approx 4 wks ago) Chart, physical therapy assessment, plan of care and goals were reviewed. ASSESSMENT Patient continues with skilled PT services and is slowly progressing towards goals. Pt received supine in bed with son at bedside.  Pt initially reporting fatigue and disagreeable to therapy, but with encouragement pt agreeable to participate. Pt continues to be limited by generalized weakness, decreased functional mobility, impaired seated balance and decreased tolerance to activity. Pt required max A x 2 supine to sit EOB and needed max assist to scoot hips forward to allow bilateral feet on the floor. Pt practiced obtaining and maintaining sitting balance; required min-max assist for posterior trunk support. Pt tolerated seated EOB ~ 10 min but eyes were kept closed that majority of the time. Pt returned to supine position with all needs met. Pt will benefit from SNF placement upon discharge. .  
 
Current Level of Function Impacting Discharge (mobility/balance): max A x 2 supine<>sit EOB, impaired seated balance and decr tolerance to activity PLAN : 
Patient continues to benefit from skilled intervention to address the above impairments. Continue treatment per established plan of care. to address goals. Recommendation for discharge: (in order for the patient to meet his/her long term goals) Therapy up to 5 days/week in SNF setting This discharge recommendation: 
Has been made in collaboration with the attending provider and/or case management SUBJECTIVE:  
Patient stated I am so tired.  OBJECTIVE DATA SUMMARY:  
Critical Behavior: 
Neurologic State: Confused, Alert, Eyes open spontaneously Orientation Level: Oriented X4(POC) Cognition: Decreased attention/concentration, Decreased command following Functional Mobility Training: 
Bed Mobility: 
  
Supine to Sit: Maximum assistance;Assist x2 Sit to Supine: Maximum assistance;Assist x2 Scooting: Maximum assistance Transfers: 
Sit to Stand: (NT) 
  
 
  
Balance: 
Sitting: Impaired Sitting - Static: Fair (occasional); Poor (constant support) Sitting - Dynamic: Poor (constant support) Standing: (NT) Ambulation/Gait Training: Therapeutic Exercises: Ankle pumps, knee flexion Pain Rating: 
Pt c/o minimal R shoulder pain Activity Tolerance:  
Fair Please refer to the flowsheet for vital signs taken during this treatment. After treatment patient left in no apparent distress:  
Supine in bed, Call bell within reach, Bed / chair alarm activated, Caregiver / family present, and Side rails x 3 
 
COMMUNICATION/COLLABORATION:  
The patients plan of care was discussed with: Occupational therapist and Registered nurse. Ministerio Tabares, PT, DPT Time Calculation: 28 mins

## 2020-09-29 NOTE — PROGRESS NOTES
[facility] Adult  Hospitalist Group Hospitalist Progress Note Valerio Vyas MD 
Answering service: [phone] OR [phone] from in house phone Date of Service:  2020 NAME:  Tony Elliott :  5/15/1927 MRN:  [id number] Admission Summary:  
 
Tony Elliott is a 80 y.o. female who presents with slurred speech and weakness. Patient was seen in ER one day ago for stroke symptoms and worked up including had an MRI which was negative for acute stroke. She was discharged home and she presents back with persistent slurred speech and weakness. There was no report of facial asymmetry or droop, focal hand or leg weakness. There is more like generalized weakness. While in the ER blood sugar per EMS was 177. Vital signs in ED showed temperature 98.3 pulse rate of 100 respiratory 23 pressure 114/71 O2 sats 97% on room air When she was in the ED, EKG showed A. fib rate controlled She had a CT scan code neuro which was negative acute stroke or large vessel occlusion She was deemed not a tpa candidate per tele neuro. An MRI of the brain has been ordered and was pending The patient was in the room with her son. She feels like nothing is wrong with her. She had a recent right shoulder fracture for which she had been to rehab. She reports that the right shoulder still hurtful and also she is having nausea associated with this. Interval history / Subjective: She sleepy but wakeful, answer questions, she has on and off confusion Not eating or drinking enough Assessment & Plan:  
 
Slurred speech and generalized weakness possible due to TIA -her symptoms resolved in 10 minutes 
-on Eliquis, aspirin and pravachol 
-CT head no acute change 
-CTA head/CT perfusion no acute large vessel occlusion 
-MRI chronic white matter disease with no acute infarction.  
-lipid panel is normal 
 -Echo pending 
-troponin <0.05 
-Seen by Neurology and cleared her for Thyroid cyst and abnormal thyroid function 
-2.6 cm cystic nodule right thyroid lobe on CTA head, not sure clinical significance -TSH elevated, free T4 normal, patient on amiodarone  
-repeat thyroid function in 4 weeks UTI POA 
-on Ceftriaxone  
-UA wbc >100, LE moderate, nitrites positive and bacteria 3+ 
-afebrile, no leukocytosis 
-urine cx grow gram negative jerry and identification is pending Chronic A Fib, rate controlled 
-EKG atrial fibrillation vent rate 102 non specific st t wave 
-continue on amiodarone, metoprolol and eliquis Hyperglycemia  
-A1c 5.6 
-no further intervention Hx of right humerus fracture s/p ORIF 
-has right shoulder pain 
-continue prn tylenol COVID 19 test on 9/29 Code status: DNR 
DVT prophylaxis: Eliquis Care Plan discussed with: Patient/Family, Nurse and  Anticipated Disposition: SNF Anticipated Discharge: 24 hours to 48 hours I called her son, Rashard Lim at , updated the clinical condition and plan of care and answered questions 9/28 I discussed with her son, Franklyn Rodriges  at bedside, questions answered Hospital Problems  Never Reviewed Codes Class Noted POA  
 UTI (urinary tract infection) ICD-10-CM: N39.0 ICD-9-CM: 599.0  9/29/2020 Unknown  
   
 TIA (transient ischemic attack) ICD-10-CM: G45.9 ICD-9-CM: 435.9  9/27/2020 Unknown Vital Signs:  
 Last 24hrs VS reviewed since prior progress note. Most recent are: 
Visit Vitals /73 (BP 1 Location: Left arm, BP Patient Position: At rest) Pulse 100 Temp 97.7 °F (36.5 °C) Resp 13 Wt 55.7 kg (122 lb 12.7 oz) SpO2 96% Breastfeeding No  
BMI 22.46 kg/m² Intake/Output Summary (Last 24 hours) at 9/29/2020 1311 Last data filed at 9/29/2020 0000 Gross per 24 hour Intake 120 ml Output  Net 120 ml Physical Examination: Constitutional:  No acute distress, cooperative, pleasant ENT:  Oral mucosa moist, oropharynx benign. Resp:  Decrease bronchial breath sound bilaterally. No wheezing/rhonchi/rales. No accessory muscle use CV:  Irregular rhythm, normal rate, no murmurs, gallops, rubs GI:  Soft, non distended, non tender. normoactive bowel sounds, no hepatosplenomegaly Musculoskeletal:  No edema, right arm sling Neurologic:  Conscious and alert, oriented to place and person, moves all extremities CN II-XII reviewed Skin:  Good turgor, no rashes or ulcers Data Review:  
 Review and/or order of clinical lab test 
Review and/or order of tests in the radiology section of CPT Review and/or order of tests in the medicine section of CPT Labs:  
 
Recent Labs  
  09/28/20 
0442 09/27/20 
1722 WBC 7.8 8.9 HGB 13.4 12.7 HCT 39.7 38.1  270 Recent Labs  
  09/29/20 
0135 09/28/20 
0442 09/27/20 
1722 * 134* 131* K 4.6 4.1 4.3  101 101 CO2 28 27 26 BUN 14 13 17 CREA 0.98 0.84 1.00 * 115* 134* CA 8.5 8.5 8.6 Recent Labs  
  09/27/20 
1722 ALT 25 AP 96 TBILI 0.8 TP 6.2* ALB 2.5*  
GLOB 3.7 Recent Labs  
  09/27/20 
1722 INR 1.1 PTP 11.5* No results for input(s): FE, TIBC, PSAT, FERR in the last 72 hours. No results found for: FOL, RBCF No results for input(s): PH, PCO2, PO2 in the last 72 hours. Recent Labs  
  09/27/20 
1722 TROIQ <0.05 Lab Results Component Value Date/Time Cholesterol, total 123 09/28/2020 04:42 AM  
 HDL Cholesterol 53 09/28/2020 04:42 AM  
 LDL, calculated 55.4 09/28/2020 04:42 AM  
 Triglyceride 73 09/28/2020 04:42 AM  
 CHOL/HDL Ratio 2.3 09/28/2020 04:42 AM  
 
No results found for: Uriel Reyes Lab Results Component Value Date/Time  Color YELLOW/STRAW 09/27/2020 11:10 PM  
 Appearance CLOUDY (A) 09/27/2020 11:10 PM  
 Specific gravity <1.005 09/27/2020 11:10 PM  
 pH (UA) 7.5 09/27/2020 11:10 PM  
 Protein TRACE (A) 09/27/2020 11:10 PM  
 Glucose Negative 09/27/2020 11:10 PM  
 Ketone Negative 09/27/2020 11:10 PM  
 Bilirubin Negative 09/27/2020 11:10 PM  
 Urobilinogen 1.0 09/27/2020 11:10 PM  
 Nitrites Positive (A) 09/27/2020 11:10 PM  
 Leukocyte Esterase MODERATE (A) 09/27/2020 11:10 PM  
 Epithelial cells FEW 09/27/2020 11:10 PM  
 Bacteria 3+ (A) 09/27/2020 11:10 PM  
 WBC >100 (H) 09/27/2020 11:10 PM  
 RBC 10-20 09/27/2020 11:10 PM  
 
 
 
Medications Reviewed:  
 
Current Facility-Administered Medications Medication Dose Route Frequency  cefTRIAXone (ROCEPHIN) 1 g in 0.9% sodium chloride (MBP/ADV) 50 mL  1 g IntraVENous Q24H  
 albuterol (PROVENTIL VENTOLIN) nebulizer solution 2.5 mg  2.5 mg Nebulization TID PRN  
 ondansetron (ZOFRAN) injection 4 mg  4 mg IntraVENous Q6H PRN  
 influenza vaccine 2020-21 (6 mos+)(PF) (FLUARIX/FLULAVAL/FLUZONE QUAD) injection 0.5 mL  0.5 mL IntraMUSCular PRIOR TO DISCHARGE  amiodarone (CORDARONE) tablet 200 mg  200 mg Oral DAILY  apixaban (ELIQUIS) tablet 2.5 mg  2.5 mg Oral BID  latanoprost (XALATAN) 0.005 % ophthalmic solution 1 Drop  1 Drop Both Eyes QHS  metoprolol tartrate (LOPRESSOR) tablet 50 mg  50 mg Oral QPM  
 pravastatin (PRAVACHOL) tablet 10 mg  10 mg Oral QHS  timolol (TIMOPTIC) 0.5 % ophthalmic solution 1 Drop  1 Drop Both Eyes BID  acetaminophen (TYLENOL) tablet 500 mg  500 mg Oral Q8H  
 acetaminophen (TYLENOL) tablet 650 mg  650 mg Oral Q4H PRN Or  
 acetaminophen (TYLENOL) solution 650 mg  650 mg Per NG tube Q4H PRN Or  
 acetaminophen (TYLENOL) suppository 650 mg  650 mg Rectal Q4H PRN  
 aspirin chewable tablet 81 mg  81 mg Oral DAILY  labetaloL (NORMODYNE;TRANDATE) injection 5 mg  5 mg IntraVENous Q10MIN PRN  
 
______________________________________________________________________ EXPECTED LENGTH OF STAY: - - - 
ACTUAL LENGTH OF STAY:          0 
 
            
 David Meraz MD

## 2020-09-29 NOTE — CONSULTS
Comprehensive Nutrition Assessment Type and Reason for Visit: Consult Nutrition Recommendations/Plan: 1. Review goals of care based on pt discussion today. May want to consider options to avoid readmission to hospital once completed with therapy at SNF? 
 
2. Encourage PO intake as accepted. Nutrition Assessment:   
Pt admitted for TIA. Past Medical History:  
Diagnosis Date  CAD (coronary artery disease)  Gastrointestinal disorder UTI noted with abx in place. Plans for d/c to SNF for rehab when ready. Poor PO intake this admit with some lethargy and confusion. Severe orbital and upper extremity wasting. Pt not a good historian \"I've been at [facility] for a year and I'm not getting any better. I just want to go home. I'm 81 yo and I don't want to be in the hospital, I just want to be home with my family. \" 
 
Spoke with nursing student who notes pt did not like some of foods today. Son reported to RN that pt will drinking Ensure. Ensure Compact added TID (660kcal, 27g protein). Encourage PO as accepted, with advanced age would not recommend more aggressive nutrition intervention even if oral intake inadequate. Malnutrition Assessment: 
Malnutrition Status:  Severe malnutrition Context:  Chronic illness Findings of the 6 clinical characteristics of malnutrition:  
Energy Intake:  7 - 75% or less est energy requirements for 1 month or longer Weight Loss: Body Fat Loss:  7 - Severe body fat loss, Orbital  
Muscle Mass Loss:  7 - Severe muscle mass loss, Hand (interosseous) Fluid Accumulation:   ,   
 Strength:     
 
Estimated Daily Nutrient Needs: 
Energy (kcal):  3760-5804([id number]) Protein (g):  67 - 1.2g/kg Fluid (ml/day):  1500 - 1ml/kcal 
 
Nutrition Related Findings:  see note BM: PTA Edema: none Wounds:  None Current Nutrition Therapies:  
Diet: cardiac Supplements: none Additional Caloric Sources: no   
Meal intake: No data found. Anthropometric Measures: 
· Height:  5' 2\" (157.5 cm) · Current Body Wt:  55.3 kg (122 lb) · Admission Body Wt:      
· Usual Body Wt: Wt Readings from Last 10 Encounters:  
09/27/20 55.7 kg (122 lb 12.7 oz) 09/26/20 55.7 kg (122 lb 12.7 oz) 09/24/20 56.7 kg (125 lb)  
09/23/20 56.7 kg (125 lb) Nutrition Diagnosis: · Severe malnutrition related to inadequate protein-energy intake as evidenced by severe loss of subcutaneous fat, severe muscle loss Nutrition Interventions:  
Food and/or Nutrient Delivery: Modify current diet, Start oral nutrition supplement Nutrition Education and Counseling: No recommendations at this time Coordination of Nutrition Care: No recommendation at this time Goals: 
Consumption of at leats 50% meals; wt maintenance Nutrition Monitoring and Evaluation:  
Behavioral-Environmental Outcomes:   
Food/Nutrient Intake Outcomes: Food and nutrient intake, Supplement intake Physical Signs/Symptoms Outcomes: Biochemical data, Weight Discharge Planning:   
Continue oral nutrition supplement, Continue current diet Lisa Corbett, RD  [address] , Pager #[phone] or via [employer]

## 2020-09-29 NOTE — PROGRESS NOTES
Problem: Falls - Risk of 
Goal: *Absence of Falls Description: Document Marcie Talbot Fall Risk and appropriate interventions in the flowsheet. Outcome: Progressing Towards Goal 
Note: Fall Risk Interventions: 
Mobility Interventions: Patient to call before getting OOB, PT Consult for mobility concerns, PT Consult for assist device competence, Strengthening exercises (ROM-active/passive), Bed/chair exit alarm Mentation Interventions: Adequate sleep, hydration, pain control, Bed/chair exit alarm, Door open when patient unattended, Reorient patient Medication Interventions: Teach patient to arise slowly, Bed/chair exit alarm Elimination Interventions: Stay With Me (per policy), Toileting schedule/hourly rounds History of Falls Interventions: Bed/chair exit alarm, Door open when patient unattended Problem: Pressure Injury - Risk of 
Goal: *Prevention of pressure injury Description: Document Josue Scale and appropriate interventions in the flowsheet. Outcome: Progressing Towards Goal 
Note: Pressure Injury Interventions: 
Sensory Interventions: Turn and reposition approx. every two hours (pillows and wedges if needed), Minimize linen layers Moisture Interventions: Check for incontinence Q2 hours and as needed, Limit adult briefs, Maintain skin hydration (lotion/cream), Minimize layers Activity Interventions: Increase time out of bed, Pressure redistribution bed/mattress(bed type), PT/OT evaluation Mobility Interventions: Float heels, HOB 30 degrees or less, Pressure redistribution bed/mattress (bed type) Nutrition Interventions: Document food/fluid/supplement intake Friction and Shear Interventions: Lift sheet, Minimize layers, HOB 30 degrees or less Problem: Urinary Tract Infection - Adult Goal: *Absence of infection signs and symptoms Outcome: Progressing Towards Goal

## 2020-09-30 NOTE — PROGRESS NOTES
[facility] Hospitalist Progress Note Joey Carlisle MD 
Answering service: [phone] OR [phone] from in house phone Date of Service:  2020 NAME:  Elizabeth Douglass :  5/15/1927 MRN:  [id number] Admission Summary:  
 
Elizabeth Douglass is a 80 y.o. female who presents with slurred speech and weakness. Patient was seen in ER one day ago for stroke symptoms and worked up including had an MRI which was negative for acute stroke. She was discharged home and she presents back with persistent slurred speech and weakness. There was no report of facial asymmetry or droop, focal hand or leg weakness. There is more like generalized weakness. While in the ER blood sugar per EMS was 177. Vital signs in ED showed temperature 98.3 pulse rate of 100 respiratory 23 pressure 114/71 O2 sats 97% on room air When she was in the ED, EKG showed A. fib rate controlled She had a CT scan code neuro which was negative acute stroke or large vessel occlusion She was deemed not a tpa candidate per tele neuro. An MRI of the brain has been ordered and was pending The patient was in the room with her son. She feels like nothing is wrong with her. She had a recent right shoulder fracture for which she had been to rehab. She reports that the right shoulder still hurtful and also she is having nausea associated with this. Interval history / Subjective:  
 
Patient mental status and slurred speech is fluctuating, has nausea, Not eating or drinking enough Per family her strength also declined Assessment & Plan:  
 
Slurred speech and generalized weakness possible due to TIA vs UTI 
-has slurred speech, possible due to dry tongue from dehydratin 
-continue on Eliquis, aspirin and pravachol 
-CT head no acute change 
-CTA head/CT perfusion no acute large vessel occlusion -MRI chronic white matter disease with no acute infarction. -lipid panel is normal 
-troponin <0.05 
-continue nuero check 
-consult to speech therapist  
-Seen by Neurology Thyroid cyst and abnormal thyroid function 
-2.6 cm cystic nodule right thyroid lobe on CTA head, not sure clinical significance -TSH elevated, free T4 normal, patient on amiodarone  
-repeat thyroid function in 4 weeks Dehydration due to poor oral intake and UTI 
-encourage po intake, normal saline at 100 ml/34 
-add multivitamin  
-monitor electrolyte UTI POA 
-on Ceftriaxone, continue normal saline  
-UA wbc >100, LE moderate, nitrites positive and bacteria 3+ 
-afebrile, no leukocytosis 
-urine cx grow gram negative jerry and identification is pending Chronic A Fib, rate controlled 
-EKG atrial fibrillation vent rate 102 non specific st t wave 
-continue on amiodarone, metoprolol and eliquis Hyperglycemia  
-A1c 5.6 
-no further intervention Hx of right humerus fracture s/p ORIF 
-has right shoulder pain 
-continue prn tylenol COVID 19 test on 9/29 result pending Code status: DNR 
DVT prophylaxis: Eliquis Care Plan discussed with: Patient/Family, Nurse and  Anticipated Disposition: SNF Anticipated Discharge: 24 hours to 48 hours I called her son, Gladys Ibarra at , updated the clinical condition and plan of care and answered questions 9/28 I discussed with her son, Leslye Lamas  at bedside, questions answered Hospital Problems  Never Reviewed Codes Class Noted POA  
 UTI (urinary tract infection) ICD-10-CM: N39.0 ICD-9-CM: 599.0  9/29/2020 Unknown  
   
 TIA (transient ischemic attack) ICD-10-CM: G45.9 ICD-9-CM: 435.9  9/27/2020 Unknown Vital Signs:  
 Last 24hrs VS reviewed since prior progress note. Most recent are: 
Visit Vitals /75 (BP 1 Location: Left arm, BP Patient Position: At rest) Pulse 100 Temp 97.2 °F (36.2 °C) Resp 18 Ht 5' 2\" (1.575 m) Wt 55.7 kg (122 lb 12.7 oz) SpO2 96% Breastfeeding No  
BMI 22.46 kg/m² Intake/Output Summary (Last 24 hours) at 9/30/2020 1211 Last data filed at 9/29/2020 1400 Gross per 24 hour Intake  Output 200 ml Net -200 ml Physical Examination:  
 
 
     
Constitutional:  No acute distress, cooperative, pleasant ENT:  Oral mucosa moist, oropharynx benign. Resp:  Decrease bronchial breath sound bilaterally. No wheezing/rhonchi/rales. No accessory muscle use CV:  Irregular rhythm, normal rate, no murmurs, gallops, rubs GI:  Soft, non distended, non tender. normoactive bowel sounds, no hepatosplenomegaly Musculoskeletal:  No edema, right arm sling Neurologic:  Conscious and alert, oriented to place and person, moves all extremities CN II-XII reviewed Skin:  Good turgor, no rashes or ulcers Data Review:  
 Review and/or order of clinical lab test 
Review and/or order of tests in the radiology section of CPT Review and/or order of tests in the medicine section of CPT Labs:  
 
Recent Labs  
  09/28/20 
0442 09/27/20 
1722 WBC 7.8 8.9 HGB 13.4 12.7 HCT 39.7 38.1  270 Recent Labs  
  09/30/20 
0242 09/29/20 
0135 09/28/20 
0408  134* 134* K 3.6 4.6 4.1  101 101 CO2 24 28 27 BUN 16 14 13 CREA 0.89 0.98 0.84 GLU 94 103* 115* CA 7.9* 8.5 8.5 Recent Labs  
  09/27/20 
1722 ALT 25 AP 96 TBILI 0.8 TP 6.2* ALB 2.5*  
GLOB 3.7 Recent Labs  
  09/27/20 
1722 INR 1.1 PTP 11.5* No results for input(s): FE, TIBC, PSAT, FERR in the last 72 hours. No results found for: FOL, RBCF No results for input(s): PH, PCO2, PO2 in the last 72 hours. Recent Labs  
  09/27/20 
1722 TROIQ <0.05 Lab Results Component Value Date/Time  Cholesterol, total 123 09/28/2020 04:42 AM  
 HDL Cholesterol 53 09/28/2020 04:42 AM  
 LDL, calculated 55.4 09/28/2020 04:42 AM  
 Triglyceride 73 09/28/2020 04:42 AM  
 CHOL/HDL Ratio 2.3 09/28/2020 04:42 AM  
 
No results found for: [place] Lab Results Component Value Date/Time Color YELLOW/STRAW 09/27/2020 11:10 PM  
 Appearance CLOUDY (A) 09/27/2020 11:10 PM  
 Specific gravity <1.005 09/27/2020 11:10 PM  
 pH (UA) 7.5 09/27/2020 11:10 PM  
 Protein TRACE (A) 09/27/2020 11:10 PM  
 Glucose Negative 09/27/2020 11:10 PM  
 Ketone Negative 09/27/2020 11:10 PM  
 Bilirubin Negative 09/27/2020 11:10 PM  
 Urobilinogen 1.0 09/27/2020 11:10 PM  
 Nitrites Positive (A) 09/27/2020 11:10 PM  
 Leukocyte Esterase MODERATE (A) 09/27/2020 11:10 PM  
 Epithelial cells FEW 09/27/2020 11:10 PM  
 Bacteria 3+ (A) 09/27/2020 11:10 PM  
 WBC >100 (H) 09/27/2020 11:10 PM  
 RBC 10-20 09/27/2020 11:10 PM  
 
 
 
Medications Reviewed:  
 
Current Facility-Administered Medications Medication Dose Route Frequency  influenza vaccine 2020-21 (6 mos+)(PF) (FLUARIX/FLULAVAL/FLUZONE QUAD) injection 0.5 mL  0.5 mL IntraMUSCular ONCE  
 0.9% sodium chloride infusion  75 mL/hr IntraVENous CONTINUOUS  
 cefTRIAXone (ROCEPHIN) 1 g in 0.9% sodium chloride (MBP/ADV) 50 mL  1 g IntraVENous Q24H  
 albuterol (PROVENTIL VENTOLIN) nebulizer solution 2.5 mg  2.5 mg Nebulization TID PRN  
 ondansetron (ZOFRAN) injection 4 mg  4 mg IntraVENous Q6H PRN  
 amiodarone (CORDARONE) tablet 200 mg  200 mg Oral DAILY  apixaban (ELIQUIS) tablet 2.5 mg  2.5 mg Oral BID  latanoprost (XALATAN) 0.005 % ophthalmic solution 1 Drop  1 Drop Both Eyes QHS  metoprolol tartrate (LOPRESSOR) tablet 50 mg  50 mg Oral QPM  
 pravastatin (PRAVACHOL) tablet 10 mg  10 mg Oral QHS  timolol (TIMOPTIC) 0.5 % ophthalmic solution 1 Drop  1 Drop Both Eyes BID  acetaminophen (TYLENOL) tablet 500 mg  500 mg Oral Q8H  
 acetaminophen (TYLENOL) tablet 650 mg  650 mg Oral Q4H PRN Or  
 acetaminophen (TYLENOL) solution 650 mg  650 mg Per NG tube Q4H PRN  Or  
  acetaminophen (TYLENOL) suppository 650 mg  650 mg Rectal Q4H PRN  
 aspirin chewable tablet 81 mg  81 mg Oral DAILY  labetaloL (NORMODYNE;TRANDATE) injection 5 mg  5 mg IntraVENous Q10MIN PRN  
 
______________________________________________________________________ EXPECTED LENGTH OF STAY: 2d 0h 
ACTUAL LENGTH OF STAY:          1 David Meraz MD

## 2020-09-30 NOTE — PROGRESS NOTES
TRANSITION OF CARE PLAN  
RUR 13% Disposition: To be Determined and possible snf Chart reviewed. SNF placement recommended. ROGERS received call from Yessicaan Moritz  [phone] ([facility]). She plans to visit patient today to determine if patient is appropriate for  [facility]. CM askd that she come after Felicia Damian 14, [facility], [address] [facility] Ria Moritz here from [facility] [facility] [place] [address]. Caryl Fonseca is requesting clinical information from the EMR to complete her assessment. 12:40. CM called [facility] and left message on  to return call. CM called Daughter -in-law Cornelia Coyle to confirm plan for [facility]. She states Jaycee Denton is parking his car and will be up to room shortly. She states family decided last night to bring patient home with son and d-I-l Lee Martinsantos and Portageviry Coyle). CM met with Jaycee Denton at bedside. He was not aware of referral being sent to The [address] [place] [address]. CM advised that CM needed to clarify plan as per yesterday the plan was snf. He responded that possibly a family had contacted [facility] at [facility]. He confirms that plan now is SNF vs return to home with family as he feels patient has declined since yesterday.

## 2020-09-30 NOTE — WOUND CARE
WOCN Note:  
 
 
New consult placed by RN for DTI to sacrum. Spoke with Nitin Roe RN and sacrum is free from [address]. None noted. Teagan NIEVES RN Wound Care Department Office: [phone] Pager: [phone]

## 2020-09-30 NOTE — PROGRESS NOTES
SPEECH PATHOLOGY BEDSIDE SWALLOW EVALUATION/DISCHARGE Patient: Omari Easmtan (76 y.o. female) Date: 9/30/2020 Primary Diagnosis: TIA (transient ischemic attack) [G45.9] UTI (urinary tract infection) [N39.0] Precautions:   Fall(RUE sling s/p ORIF approx 4 wks ago) ASSESSMENT : 
Based on the objective data described below, the patient with limited intake of PO. Patient agreed to dimple crackers and water; she declined applesauce. Patient tolerated all PO trials without overt s/s aspiration. Patient was slow to chew but mastication and oral clearance was complete. Suspect prolonged chewing related to lack of desire for PO rather than mechanical difficulty. Patient reports \"no appetite\" and nausea. Son reports patient required max encouragement to consume 16 ounces of fluid yesterday. Oral and pharyngeal swallow grossly intact. Risk of prandial aspiration is low. If patient nauseous and vomiting then patient at risk for post prandial aspiration. Unfortunately little to offer to improve intake - suspect if diet downgraded patient would be further limited in food optionis. Skilled acute therapy provided by a speech-language pathologist is not indicated at this time. PLAN : 
Recommendations: 
Regular diet Thin liquids Sit up for all PO Medication as tolerated Discharge Recommendations: None SUBJECTIVE:  
Patient stated I just want to fade off into the sunset. . Patient reports no appetite and nausea. Son reports poor intake for past month. OBJECTIVE:  
 
Past Medical History:  
Diagnosis Date  CAD (coronary artery disease)  Gastrointestinal disorder History reviewed. No pertinent surgical history. Prior Level of Function/Home Situation:  
Home Situation Home Environment: Private residence # Steps to Enter: 5 Rails to Enter: Yes One/Two Story Residence: Two story, live on 1st floor Living Alone: No 
Support Systems: Family member(s), Home care staff Patient Expects to be Discharged to[de-identified] Private residence Current DME Used/Available at Home: [employer]) Tub or Shower Type: Shower Diet prior to admission: Regular Current Diet:  Regular Cognitive and Communication Status: 
Neurologic State: Alert, Confused Orientation Level: Oriented to person, Disoriented to time, Disoriented to situation, Disoriented to place Cognition: Impaired decision making, Decreased attention/concentration Perception: Cues to maintain midline in sitting, Tactile, Verbal, Visual(pt tends to lean or lose balance posteriorly) Perseveration: No perseveration noted Safety/Judgement: Decreased awareness of environment Oral Assessment: 
Oral Assessment Labial: No impairment Dentition: Lower dentures; Natural 
Oral Hygiene: Clean, mildly dry oral mucosa. Lingual: No impairment Velum: No impairment Mandible: No impairment P.O. Trials: 
Patient Position: Upright in bed Vocal quality prior to P.O.: No impairment Consistency Presented: Solid; Thin liquid How Presented: Self-fed/presented;Straw Bolus Acceptance: No impairment Bolus Formation/Control: Impaired Type of Impairment: Delayed;Mastication Propulsion: Delayed (# of seconds) Oral Residue: None Initiation of Swallow: Delayed (# of seconds) Laryngeal Elevation: Functional 
Aspiration Signs/Symptoms: None NOMS:  
The NOMS functional outcome measure was used to quantify this patient's level of swallowing impairment. Based on the NOMS, the patient was determined to be at level 6 for swallow function NOMS Swallowing Levels: 
Level 1 (CN): NPO Level 2 (CM): NPO but takes consistency in therapy Level 3 (CL): Takes less than 50% of nutrition p.o. and continues with nonoral feedings; and/or safe with mod cues; and/or max diet restriction Level 4 (CK): Safe swallow but needs mod cues; and/or mod diet restriction; and/or still requires some nonoral feeding/supplements Level 5 (CJ): Safe swallow with min diet restriction; and/or needs min cues Level 6 (CI): Independent with p.o.; rare cues; usually self cues; may need to avoid some foods or needs extra time Level 7 (CH): Independent for all p.o. DAVID. (2003). National Outcomes Measurement System (NOMS): Adult Speech-Language Pathology User's Guide. After treatment:  
Patient left in no apparent distress in bed, Call bell within reach, Nursing notified and Caregiver / family present COMMUNICATION/EDUCATION:  
Patient was educated regarding role of SLP, rationale for consult, diet and POC. Patient nodded. Patient's son indicated understanding. The patient's plan of care including recommendations, planned interventions, and recommended diet changes were discussed with: Registered nurse. Thank you for this referral. 
Rhoda Benites, SLP Time Calculation: 20 mins

## 2020-10-01 NOTE — PROGRESS NOTES
patients daughter stated that the patient had been in and out of the hospital, she had not returned home yet but as of this am, doctors were thinking she would be discharged end of day, awaiting to hear from the family when patient can return home and have a EUGENIA.  will evaluate patient then

## 2020-10-01 NOTE — PROGRESS NOTES
Physician Progress Note Carlos Marquez 
CSN #:                  [id number] :                       5/15/1927 ADMIT DATE:       2020 4:55 PM 
[address] [place] DATE: 
RESPONDING 
PROVIDER #:        Thalia Perry MD 
 
 
 
 
QUERY TEXT: 
 
Pt admitted with TIA and has malnutrition documented by RD PN -20. Please further specify type of malnutrition with documentation in the medical record. The medical record reflects the following: 
Risk Factors: Poor PO intake this admit with some lethargy and confusion. Severe orbital and upper extremity wasting. Clinical Indicators: 20 RD PNMalnutrition Assessment: 
Malnutrition Status:  Severe malnutrition Context:  Chronic illness Energy Intake:  7 - 75% or less est energy requirements for 1 month or longer Body Fat Loss:  7 - Severe body fat loss, Orbital 
Muscle Mass Loss:  7 - Severe muscle mass loss, Hand (interosseous Ht: 5' 2\" (1.575 m) Wt: 55.7 kg (122 lb 12.7 oz) BMI: 22.46 kg/m 2 
20 Nutrition Diagnosis: 
? Severe malnutrition related to inadequate protein-energy intake as evidenced by severe loss of subcutaneous fat, severe muscle loss Treatment: RD consult, regular diet, ensure compact TID, encourage PO, I/O's, pt weights, Pt/OT consult, Cheryle J Rilee RN,BSN Clinical Documentation Integrity [phone] Options provided: 
-- Chronic, Severe Protein calorie Malnutrition -- Severe malnutrition -- Other - I will add my own diagnosis -- Disagree - Not applicable / Not valid -- Disagree - Clinically unable to determine / Unknown 
-- Refer to Clinical Documentation Reviewer PROVIDER RESPONSE TEXT: 
 
This patient has severe malnutrition.  
 
Query created by: Nandini An on 10/1/2020 1:43 PM 
 
 
Electronically signed by:  Thalia Perry MD 10/1/2020 6:51 PM

## 2020-10-01 NOTE — PROGRESS NOTES
Problem: Mobility Impaired (Adult and Pediatric) Goal: *Acute Goals and Plan of Care (Insert Text) Description: FUNCTIONAL STATUS PRIOR TO ADMISSION: Patient was independent and active without the use of DME prior to her shoulder injury (R humeral fx w/ ORIF) about a month ago. Since that time, the patient has relied on a manual wheelchair pushed by caregivers for ambulation. She is able to walk short distances with considerable assistance from caregivers. She was not able to sit unsupported. HOME SUPPORT PRIOR TO ADMISSION: The patient lived alone with no support required prior to her shoulder injury. She discharged [facility] to her son and daughter in [address] [place] [address]. Caregivers assist with adls and mobility. Patient is open to home health for PT/ OT. Physical Therapy Goals Initiated 9/28/2020 1. Patient will move from supine to sit and sit to supine , scoot up and down, and roll side to side in bed with moderate assistance  within 7 day(s). 2.  Patient will transfer from bed to chair and chair to bed with moderate assistance  using the least restrictive device within 7 day(s). 3.  Patient will perform sit to stand with moderate assistance  within 7 day(s). 4.  Patient will ambulate with moderate assistance  for 25 feet with the least restrictive device within 7 day(s). Outcome: Progressing Towards Goal 
 PHYSICAL THERAPY TREATMENT Patient: Arvind Walton (50 y.o. female) Date: 10/1/2020 Diagnosis: TIA (transient ischemic attack) [G45.9] UTI (urinary tract infection) [N39.0] <principal problem not specified> Precautions: Fall(RUE sling s/p ORIF approx 4 wks ago) Chart, physical therapy assessment, plan of care and goals were reviewed. ASSESSMENT Patient continues with skilled PT services and is progressing towards goals. Pt requires maximal encouragement from son to participate with therapy. Pt fearful of pain and falling.  Eventually agreeable to transfer to EOB(Mod A) and attempt one stand with Min A x 2. Pt requires constant posterior support in sitting d/t fatigue and for increased pt comfort/confidence. Pt reporting increased nausea and with small clear emesis once back to supine. Will benefit from continued rehab at discharge. Current Level of Function Impacting Discharge (mobility/balance): Mod A for bed mobility, does not ambulate Other factors to consider for discharge: fear of falling, nausea today, limited PO intake PLAN : 
Patient continues to benefit from skilled intervention to address the above impairments. Continue treatment per established plan of care. to address goals. Recommendation for discharge: (in order for the patient to meet his/her long term goals) Therapy up to 5 days/week in SNF setting This discharge recommendation: 
Has been made in collaboration with the attending provider and/or case management IF patient discharges home will need the following DME: patient owns DME required for discharge SUBJECTIVE:  
Patient stated Emmett Odanah you leave me alone if I do it.  OBJECTIVE DATA SUMMARY:  
Critical Behavior: 
Neurologic State: Alert, Confused Orientation Level: Oriented to person, Oriented to place, Oriented to situation, Disoriented to time Cognition: Follows commands Safety/Judgement: Decreased awareness of environment Functional Mobility Training: 
Bed Mobility: 
  
Supine to Sit: Moderate assistance;Assist x1 Sit to Supine: Maximum assistance Scooting: Moderate assistance Transfers: 
Sit to Stand: Assist x2;Minimum assistance Balance: 
Sitting: Impaired Sitting - Static: Fair (occasional) Sitting - Dynamic: Poor (constant support) Standing: Impaired Standing - Static: Poor Standing - Dynamic : Poor Ambulation/Gait Training: 
  
  
  
  
  
  
  
  
 
   
 
Therapeutic Exercises: Ankle pumps, heel slides, hip abduction, quad sets Activity Tolerance: Fair 
Please refer to the flowsheet for vital signs taken during this treatment. After treatment patient left in no apparent distress:  
Supine in bed, Call bell within reach, Bed / chair alarm activated, Caregiver / family present, and Side rails x 3 
 
COMMUNICATION/COLLABORATION:  
The patients plan of care was discussed with: Registered nurse. Wesley Bhardwaj, PT Time Calculation: 30 mins

## 2020-10-01 NOTE — PROGRESS NOTES
Problem: Falls - Risk of 
Goal: *Absence of Falls Description: Document Yvette Guerrero Fall Risk and appropriate interventions in the flowsheet. Outcome: Progressing Towards Goal 
Note: Fall Risk Interventions: 
Mobility Interventions: Bed/chair exit alarm, Communicate number of staff needed for ambulation/transfer, Patient to call before getting OOB Mentation Interventions: Adequate sleep, hydration, pain control, Bed/chair exit alarm, Door open when patient unattended Medication Interventions: Evaluate medications/consider consulting pharmacy, Patient to call before getting OOB Elimination Interventions: Call light in reach, Toileting schedule/hourly rounds, Patient to call for help with toileting needs History of Falls Interventions: Bed/chair exit alarm, Door open when patient unattended Problem: Patient Education: Go to Patient Education Activity Goal: Patient/Family Education Outcome: Progressing Towards Goal 
  
Problem: Pressure Injury - Risk of 
Goal: *Prevention of pressure injury Description: Document Josue Scale and appropriate interventions in the flowsheet. Outcome: Progressing Towards Goal 
Note: Pressure Injury Interventions: 
Sensory Interventions: Assess changes in LOC Moisture Interventions: Absorbent underpads, Apply protective barrier, creams and emollients Activity Interventions: Increase time out of bed, Pressure redistribution bed/mattress(bed type), PT/OT evaluation Mobility Interventions: Float heels, HOB 30 degrees or less, Pressure redistribution bed/mattress (bed type) Nutrition Interventions: Document food/fluid/supplement intake Friction and Shear Interventions: HOB 30 degrees or less, Lift sheet Problem: Patient Education: Go to Patient Education Activity Goal: Patient/Family Education Outcome: Progressing Towards Goal 
  
Problem: Urinary Tract Infection - Adult Goal: *Absence of infection signs and symptoms Outcome: Progressing Towards Goal 
  
Problem: Patient Education: Go to Patient Education Activity Goal: Patient/Family Education Outcome: Progressing Towards Goal

## 2020-10-01 NOTE — PROGRESS NOTES
Problem: Pressure Injury - Risk of 
Goal: *Prevention of pressure injury Description: Document Josue Scale and appropriate interventions in the flowsheet. Outcome: Progressing Towards Goal 
Note: Pressure Injury Interventions: 
Sensory Interventions: Assess changes in LOC, Check visual cues for pain, Float heels, Discuss PT/OT consult with provider, Turn and reposition approx. every two hours (pillows and wedges if needed), Minimize linen layers, Maintain/enhance activity level, Keep linens dry and wrinkle-free Moisture Interventions: Absorbent underpads, Apply protective barrier, creams and emollients, Check for incontinence Q2 hours and as needed, Moisture barrier, Minimize layers, Maintain skin hydration (lotion/cream) Activity Interventions: Increase time out of bed Mobility Interventions: Float heels, HOB 30 degrees or less, Turn and reposition approx. every two hours(pillow and wedges) Nutrition Interventions: Document food/fluid/supplement intake Friction and Shear Interventions: HOB 30 degrees or less, Minimize layers Problem: Patient Education: Go to Patient Education Activity Goal: Patient/Family Education Outcome: Progressing Towards Goal 
  
Problem: Urinary Tract Infection - Adult Goal: *Absence of infection signs and symptoms Outcome: Progressing Towards Goal

## 2020-10-01 NOTE — PROGRESS NOTES
[address] [facility] Adult  Hospitalist Group Hospitalist Progress Note Raiza Young MD 
Answering service: [phone] OR [phone] from in house phone Date of Service:  10/1/2020 NAME:  Mary Franks :  5/15/1927 MRN:  [id number] Admission Summary:  
 
Mary Franks is a 80 y.o. female who presents with slurred speech and weakness. Patient was seen in ER one day ago for stroke symptoms and worked up including had an MRI which was negative for acute stroke. She was discharged home and she presents back with persistent slurred speech and weakness. There was no report of facial asymmetry or droop, focal hand or leg weakness. There is more like generalized weakness. While in the ER blood sugar per EMS was 177. Vital signs in ED showed temperature 98.3 pulse rate of 100 respiratory 23 pressure 114/71 O2 sats 97% on room air When she was in the ED, EKG showed A. fib rate controlled She had a CT scan code neuro which was negative acute stroke or large vessel occlusion She was deemed not a tpa candidate per tele neuro. An MRI of the brain has been ordered and was pending The patient was in the room with her son. She feels like nothing is wrong with her. She had a recent right shoulder fracture for which she had been to rehab. She reports that the right shoulder still hurtful and also she is having nausea associated with this. Interval history / Subjective:  
 
Pt seen in room denies fevers or chills. Assessment & Plan:  
 
Slurred speech and generalized weakness possible due to TIA vs UTI 
-has slurred speech, possible due to dry tongue from dehydratin 
-continue on Eliquis, aspirin and pravachol 
-CT head no acute change 
-CTA head/CT perfusion no acute large vessel occlusion 
-MRI chronic white matter disease with no acute infarction.  
-lipid panel is normal 
-troponin <0.05 
 -continue neuro checks 
-consult to speech therapist  
-Seen by Neurology Thyroid cyst and abnormal thyroid function 
-2.6 cm cystic nodule right thyroid lobe on CTA head, not sure clinical significance -TSH elevated, free T4 normal, patient on amiodarone  
-repeat thyroid function in 4 weeks Dehydration due to poor oral intake and UTI 
-encourage po intake, normal saline at 100 ml/34 
-add multivitamin  
-monitor electrolyte UTI POA 
-urine cx showing pseudomonas - switch to zosyn and plan to change to oral if sensitivities allow. Called Micro . Chronic A Fib, rate controlled 
-EKG atrial fibrillation vent rate 102 non specific st t wave 
-continue on amiodarone, metoprolol and eliquis Hyperglycemia  
-A1c 5.6 
-no further intervention Hx of right humerus fracture s/p ORIF 
-has right shoulder pain 
-continue prn tylenol COVID 19 test on 9/29 negative Code status: DNR 
DVT prophylaxis: Eliquis Care Plan discussed with: Patient/Family, Nurse and  Anticipated Disposition: SNF Anticipated Discharge: 24 hours to 48 hours I called her son, Lindsay Vera at , updated the clinical condition and plan of care and answered questions 9/28 I discussed with her son at bedside, questions answered [place]  Hospital Problems  Never Reviewed Codes Class Noted POA  
 UTI (urinary tract infection) ICD-10-CM: N39.0 ICD-9-CM: 599.0  9/29/2020 Unknown  
   
 TIA (transient ischemic attack) ICD-10-CM: G45.9 ICD-9-CM: 435.9  9/27/2020 Unknown Vital Signs:  
 Last 24hrs VS reviewed since prior progress note. Most recent are: 
Visit Vitals /73 (BP Patient Position: At rest) Pulse (!) 104 Temp 97.8 °F (36.6 °C) Resp 14 Ht 5' 2\" (1.575 m) Wt 55.7 kg (122 lb 12.7 oz) SpO2 93% Breastfeeding No  
BMI 22.46 kg/m² No intake or output data in the 24 hours ending 10/01/20 1056 Physical Examination: Constitutional:  No acute distress, cooperative, pleasant ENT:  Oral mucosa moist, oropharynx benign. Resp:  Decrease bronchial breath sound bilaterally. No wheezing/rhonchi/rales. No accessory muscle use CV:  Irregular rhythm, normal rate, no murmurs, gallops, rubs GI:  Soft, non distended, non tender. normoactive bowel sounds, no hepatosplenomegaly Musculoskeletal:  No edema, right arm sling Neurologic:  Conscious and alert, oriented to place and person, moves all extremities CN II-XII reviewed Skin:  Good turgor, no rashes or ulcers Data Review:  
 Review and/or order of clinical lab test 
Review and/or order of tests in the radiology section of CPT Review and/or order of tests in the medicine section of CPT Labs:  
 
Recent Labs 10/01/20 
0113 WBC 9.3 HGB 13.0 HCT 39.0  Recent Labs 10/01/20 
0113 09/30/20 
0242 09/29/20 
5239 * 136 134* K 3.8 3.6 4.6  105 101 CO2 23 24 28 BUN 13 16 14 CREA 0.78 0.89 0.98  
* 94 103* CA 8.0* 7.9* 8.5 Recent Labs 10/01/20 
0113 ALT 24 AP 88 TBILI 0.7 TP 5.8* ALB 2.4*  
GLOB 3.4 No results for input(s): INR, PTP, APTT, INREXT, INREXT in the last 72 hours. No results for input(s): FE, TIBC, PSAT, FERR in the last 72 hours. No results found for: FOL, RBCF No results for input(s): PH, PCO2, PO2 in the last 72 hours. No results for input(s): CPK, CKNDX, TROIQ in the last 72 hours. No lab exists for component: CPKMB Lab Results Component Value Date/Time Cholesterol, total 123 09/28/2020 04:42 AM  
 HDL Cholesterol 53 09/28/2020 04:42 AM  
 LDL, calculated 55.4 09/28/2020 04:42 AM  
 Triglyceride 73 09/28/2020 04:42 AM  
 CHOL/HDL Ratio 2.3 09/28/2020 04:42 AM  
 
No results found for: [facility] Lab Results Component Value Date/Time  Color YELLOW/STRAW 09/27/2020 11:10 PM  
 Appearance CLOUDY (A) 09/27/2020 11:10 PM  
 Specific gravity <1.005 09/27/2020 11:10 PM  
 pH (UA) 7.5 09/27/2020 11:10 PM  
 Protein TRACE (A) 09/27/2020 11:10 PM  
 Glucose Negative 09/27/2020 11:10 PM  
 Ketone Negative 09/27/2020 11:10 PM  
 Bilirubin Negative 09/27/2020 11:10 PM  
 Urobilinogen 1.0 09/27/2020 11:10 PM  
 Nitrites Positive (A) 09/27/2020 11:10 PM  
 Leukocyte Esterase MODERATE (A) 09/27/2020 11:10 PM  
 Epithelial cells FEW 09/27/2020 11:10 PM  
 Bacteria 3+ (A) 09/27/2020 11:10 PM  
 WBC >100 (H) 09/27/2020 11:10 PM  
 RBC 10-20 09/27/2020 11:10 PM  
 
 
 
Medications Reviewed:  
 
Current Facility-Administered Medications Medication Dose Route Frequency  0.9% sodium chloride infusion  100 mL/hr IntraVENous CONTINUOUS  therapeutic multivitamin (THERAGRAN) tablet 1 Tab  1 Tab Oral DAILY  polyethylene glycol (MIRALAX) packet 17 g  17 g Oral DAILY  docusate sodium (COLACE) capsule 100 mg  100 mg Oral BID PRN  
 cefTRIAXone (ROCEPHIN) 1 g in 0.9% sodium chloride (MBP/ADV) 50 mL  1 g IntraVENous Q24H  
 albuterol (PROVENTIL VENTOLIN) nebulizer solution 2.5 mg  2.5 mg Nebulization TID PRN  
 ondansetron (ZOFRAN) injection 4 mg  4 mg IntraVENous Q6H PRN  
 amiodarone (CORDARONE) tablet 200 mg  200 mg Oral DAILY  apixaban (ELIQUIS) tablet 2.5 mg  2.5 mg Oral BID  latanoprost (XALATAN) 0.005 % ophthalmic solution 1 Drop  1 Drop Both Eyes QHS  metoprolol tartrate (LOPRESSOR) tablet 50 mg  50 mg Oral QPM  
 pravastatin (PRAVACHOL) tablet 10 mg  10 mg Oral QHS  timolol (TIMOPTIC) 0.5 % ophthalmic solution 1 Drop  1 Drop Both Eyes BID  acetaminophen (TYLENOL) tablet 650 mg  650 mg Oral Q4H PRN  
 aspirin chewable tablet 81 mg  81 mg Oral DAILY  labetaloL (NORMODYNE;TRANDATE) injection 5 mg  5 mg IntraVENous Q10MIN PRN  
 
______________________________________________________________________ EXPECTED LENGTH OF STAY: 2d 0h 
ACTUAL LENGTH OF STAY:          2 
 
            
Jessica Bowman MD

## 2020-10-01 NOTE — PROGRESS NOTES
Day #1 of levofloxacin Indication:  pseudomonas UTI Current regimen:  500 mg Q24h Abx regimen: levofloxacin Recent Labs 10/01/20 
0113 20 
0242 20 
8282 WBC 9.3  --   --   
CREA 0.78 0.89 0.98 BUN 13 16 14 Est CrCl: 35 ml/min Temp (24hrs), Av.7 °F (36.5 °C), Min:97.5 °F (36.4 °C), Max:97.9 °F (36.6 °C) Cultures: urine > 100K pseudomonas (S= levofloxacin) Plan: Change to levofloxacin 750 mg Q48H for severe UTI and crcl < 50 ml/min

## 2020-10-02 NOTE — PROGRESS NOTES
[facility] [facility] Adult  Hospitalist Group Hospitalist Progress Note Dominga Chávez MD 
Answering service: [phone] OR [phone] from in house phone Date of Service:  10/2/2020 NAME:  Monique Vines :  5/15/1927 MRN:  [id number] Admission Summary:  
 
Monique Vines is a 80 y.o. female who presents with slurred speech and weakness. Patient was seen in ER one day ago for stroke symptoms and worked up including had an MRI which was negative for acute stroke. She was discharged home and she presents back with persistent slurred speech and weakness. There was no report of facial asymmetry or droop, focal hand or leg weakness. There is more like generalized weakness. While in the ER blood sugar per EMS was 177. Vital signs in ED showed temperature 98.3 pulse rate of 100 respiratory 23 pressure 114/71 O2 sats 97% on room air When she was in the ED, EKG showed A. fib rate controlled She had a CT scan code neuro which was negative acute stroke or large vessel occlusion She was deemed not a tpa candidate per tele neuro. An MRI of the brain has been ordered and was pending The patient was in the room with her son. She feels like nothing is wrong with her. She had a recent right shoulder fracture for which she had been to rehab. She reports that the right shoulder still hurtful and also she is having nausea associated with this. Interval history / Subjective:  
 
Pt seen in room denies fevers or chills. She was seen in room, Son was in room with her. Assessment & Plan:  
 
Slurred speech and generalized weakness possible due to TIA vs UTI 
-has slurred speech, possible due to dry tongue from dehydratin 
-continue on Eliquis, aspirin and pravachol 
-CT head no acute change 
-CTA head/CT perfusion no acute large vessel occlusion -MRI chronic white matter disease with no acute infarction. -lipid panel is normal 
-troponin <0.05 
-continue neuro checks 
-consult to speech therapist  
-Seen by Neurology - thinks from UTI and not realy a true seizure Thyroid cyst and abnormal thyroid function 
-2.6 cm cystic nodule right thyroid lobe on CTA head, not sure clinical significance -TSH elevated, free T4 normal, patient on amiodarone  
-repeat thyroid function in 4 weeks Dehydration due to poor oral intake and UTI 
-encourage po intake, normal saline at 100 ml/34 
-add multivitamin  
-monitor electrolyte UTI POA 
-urine cx showing pseudomonas - switch to zosyn Can use cipro however she has prolonged qtc and hence limited in options,Plan for 3 day course. Chronic A Fib, rate controlled 
-EKG atrial fibrillation vent rate 102 non specific st t wave 
-continue on amiodarone, metoprolol and eliquis Hyperglycemia  
-A1c 5.6 
-no further intervention Hx of right humerus fracture s/p ORIF 
-has right shoulder pain 
-continue prn tylenol COVID 19 test on 9/29 negative Code status: DNR 
DVT prophylaxis: Eliquis Care Plan discussed with: Patient/Family, Nurse and  Anticipated Disposition: SNF Anticipated Discharge: 24 hours to 48 hours   On IV abx I discussed with her son at bedside, questions answered Guzman Malachi , no answer. Hospital Problems  Never Reviewed Codes Class Noted POA  
 UTI (urinary tract infection) ICD-10-CM: N39.0 ICD-9-CM: 599.0  9/29/2020 Unknown  
   
 TIA (transient ischemic attack) ICD-10-CM: G45.9 ICD-9-CM: 435.9  9/27/2020 Unknown Vital Signs:  
 Last 24hrs VS reviewed since prior progress note. Most recent are: 
Visit Vitals /78 (BP 1 Location: Left arm, BP Patient Position: At rest) Pulse 71 Temp 97.9 °F (36.6 °C) Resp 17 Ht 5' 2\" (1.575 m) Wt 55.7 kg (122 lb 12.7 oz) SpO2 98% Breastfeeding No  
 BMI 22.46 kg/m² Intake/Output Summary (Last 24 hours) at 10/2/2020 1504 Last data filed at 10/2/2020 1919 Gross per 24 hour Intake 60 ml Output  Net 60 ml Physical Examination:  
 
 
     
Constitutional:  No acute distress, cooperative, pleasant ENT:  Oral mucosa moist, oropharynx benign. Resp:  Decrease bronchial breath sound bilaterally. No wheezing/rhonchi/rales. No accessory muscle use CV:  Irregular rhythm, normal rate, no murmurs, gallops, rubs GI:  Soft, non distended, non tender. normoactive bowel sounds, no hepatosplenomegaly Musculoskeletal:  No edema, right arm sling Neurologic:  Conscious and alert, oriented to place and person, moves all extremities CN II-XII reviewed Skin:  Good turgor, no rashes or ulcers Data Review:  
 Review and/or order of clinical lab test 
Review and/or order of tests in the radiology section of CPT Review and/or order of tests in the medicine section of CPT Labs:  
 
Recent Labs 10/01/20 
0113 WBC 9.3 HGB 13.0 HCT 39.0  Recent Labs 10/01/20 
0113 09/30/20 
7194 * 136  
K 3.8 3.6  105 CO2 23 24 BUN 13 16 CREA 0.78 0.89 * 94  
CA 8.0* 7.9* Recent Labs 10/01/20 
0113 ALT 24 AP 88 TBILI 0.7 TP 5.8* ALB 2.4*  
GLOB 3.4 No results for input(s): INR, PTP, APTT, INREXT, INREXT in the last 72 hours. No results for input(s): FE, TIBC, PSAT, FERR in the last 72 hours. No results found for: FOL, RBCF No results for input(s): PH, PCO2, PO2 in the last 72 hours. No results for input(s): CPK, CKNDX, TROIQ in the last 72 hours. No lab exists for component: CPKMB Lab Results Component Value Date/Time Cholesterol, total 123 09/28/2020 04:42 AM  
 HDL Cholesterol 53 09/28/2020 04:42 AM  
 LDL, calculated 55.4 09/28/2020 04:42 AM  
 Triglyceride 73 09/28/2020 04:42 AM  
 CHOL/HDL Ratio 2.3 09/28/2020 04:42 AM  
 
No results found for: [facility] Lab Results Component Value Date/Time Color YELLOW/STRAW 09/27/2020 11:10 PM  
 Appearance CLOUDY (A) 09/27/2020 11:10 PM  
 Specific gravity <1.005 09/27/2020 11:10 PM  
 pH (UA) 7.5 09/27/2020 11:10 PM  
 Protein TRACE (A) 09/27/2020 11:10 PM  
 Glucose Negative 09/27/2020 11:10 PM  
 Ketone Negative 09/27/2020 11:10 PM  
 Bilirubin Negative 09/27/2020 11:10 PM  
 Urobilinogen 1.0 09/27/2020 11:10 PM  
 Nitrites Positive (A) 09/27/2020 11:10 PM  
 Leukocyte Esterase MODERATE (A) 09/27/2020 11:10 PM  
 Epithelial cells FEW 09/27/2020 11:10 PM  
 Bacteria 3+ (A) 09/27/2020 11:10 PM  
 WBC >100 (H) 09/27/2020 11:10 PM  
 RBC 10-20 09/27/2020 11:10 PM  
 
 
 
Medications Reviewed:  
 
Current Facility-Administered Medications Medication Dose Route Frequency  prochlorperazine (COMPAZINE) with saline injection 10 mg  10 mg IntraVENous Q6H PRN  piperacillin-tazobactam (ZOSYN) 3.375 g in 0.9% sodium chloride (MBP/ADV) 100 mL  3.375 g IntraVENous Q8H  
 therapeutic multivitamin (THERAGRAN) tablet 1 Tab  1 Tab Oral DAILY  polyethylene glycol (MIRALAX) packet 17 g  17 g Oral DAILY  docusate sodium (COLACE) capsule 100 mg  100 mg Oral BID PRN  
 albuterol (PROVENTIL VENTOLIN) nebulizer solution 2.5 mg  2.5 mg Nebulization TID PRN  
 ondansetron (ZOFRAN) injection 4 mg  4 mg IntraVENous Q6H PRN  
 amiodarone (CORDARONE) tablet 200 mg  200 mg Oral DAILY  apixaban (ELIQUIS) tablet 2.5 mg  2.5 mg Oral BID  latanoprost (XALATAN) 0.005 % ophthalmic solution 1 Drop  1 Drop Both Eyes QHS  metoprolol tartrate (LOPRESSOR) tablet 50 mg  50 mg Oral QPM  
 pravastatin (PRAVACHOL) tablet 10 mg  10 mg Oral QHS  timolol (TIMOPTIC) 0.5 % ophthalmic solution 1 Drop  1 Drop Both Eyes BID  acetaminophen (TYLENOL) tablet 650 mg  650 mg Oral Q4H PRN  
 aspirin chewable tablet 81 mg  81 mg Oral DAILY  labetaloL (NORMODYNE;TRANDATE) injection 5 mg  5 mg IntraVENous Q10MIN PRN  
 
 ______________________________________________________________________ EXPECTED LENGTH OF STAY: 2d 0h 
ACTUAL LENGTH OF STAY:          3 
 
            
Albino Industry, MD

## 2020-10-02 NOTE — PROGRESS NOTES
Occupational Therapy: chart reviewed. Noted Physical Therapy note:pt sleeping and unable to arouse for participation with multimodal stim this AM.  RN aware and states pt has been sleeping all day. Will follow and see as able and appropriate.  
MAGDALENE Tiwari/VISHAL

## 2020-10-02 NOTE — PROGRESS NOTES
Physical Therapy PT intervention deferred due to pt sleeping and unable to arouse for participation with multimodal stim. RN aware and states pt has been sleeping all day. Will continue to follow.  
 
Priyanka Chase, PT, MPT

## 2020-10-02 NOTE — PROGRESS NOTES
Spiritual Care Assessment/Progress Note [address] 
 
 
NAME: Mike Olea      MRN: [id number] AGE: 80 y.o. SEX: female Pentecostal Affiliation: Jew Language: English  
 
10/2/2020     Total Time (in minutes): 20 Spiritual Assessment begun in [facility] through conversation with: 
  
    [x]Patient        [x] Family    [] Friend(s) Reason for Consult: Initial/Spiritual assessment, patient floor Spiritual beliefs: (Please include comment if needed) [x] Identifies with a cherelle tradition:  Non-Baptist   
   [] Supported by a cherelle community:        
   [] Claims no spiritual orientation:       
   [] Seeking spiritual identity:            
   [] Adheres to an individual form of spirituality:       
   [] Not able to assess:                   
 
    
Identified resources for coping:  
   [x] Prayer                           
   [] Music                  [] Guided Imagery [x] Family/friends                 [] Pet visits [] Devotional reading                         [] Unknown 
   [] Other:                                      
 
 
Interventions offered during this visit: (See comments for more details) Patient Interventions: Affirmation of emotions/emotional suffering, Affirmation of cherelle, Iconic (affirming the presence of God/Higher Power), Initial/Spiritual assessment, patient floor, Prayer (assurance of), Referral to music therapy Family/Friend(s): Catharsis/review of pertinent events in supportive environment, Affirmation of cherelle, Iconic (affirming the presence of God/Higher Power), Prayer (assurance of), Pentecostal beliefs/image of God discussed Plan of Care: 
 
 [x] Support spiritual and/or cultural needs  
 [] Support AMD and/or advance care planning process    
 [] Support grieving process 
 [] Coordinate Rites and/or Rituals  
 [] Coordination with community clergy [] No spiritual needs identified at this time [] Detailed Plan of Care below (See Comments)  [x] Make referral to Music Therapy 
[] Make referral to Pet Therapy    
[] Make referral to Addiction services 
[] Make referral to [facility] 
[] Make referral to Spiritual Care Partner 
[] No future visits requested       
[x] Follow up visits as needed Comments:  Initial visit on Oncology unit for spiritual assessment. Patient's son was standing outside room as this  approached for visit. Engaged him initially to inquire about his mother. Patient's son expressed mild concern that patient has such a poor appetite. Patient has 3 sons; 1 in [place], one in [place], and one in [facility]. Patient was awake, alert, inclined in her bed. She appeared relaxed and in no distress. She expressed no spiritual needs or concerns, but was receptive to visit and assurance of prayer. Son shared she was raised [place], but now identifies more as [facility] bedside presence, supportive listening and words of encouragement. Advised them of  availability at which time patient indicated she would like a return visit. Will make referral for follow up visit as well as a referral to music therapy. Gilford Pae, MPS, [address] [facility], Staff  [facility]  Paging Service  [phone])

## 2020-10-03 NOTE — PROGRESS NOTES
Follow-up on CT scan report with patient and family. All questions answered. Results conveyed. Discussed case with neurology who will evaluate patient.

## 2020-10-03 NOTE — PROGRESS NOTES
[facility] Hospitalist Progress Note Raiza Young MD 
Answering service: [phone] OR [phone] from in house phone Date of Service:  10/3/2020 NAME:  Mary Franks :  5/15/1927 MRN:  [id number] Admission Summary:  
 
Mary Franks is a 80 y.o. female who presents with slurred speech and weakness. Patient was seen in ER one day ago for stroke symptoms and worked up including had an MRI which was negative for acute stroke. She was discharged home and she presents back with persistent slurred speech and weakness. There was no report of facial asymmetry or droop, focal hand or leg weakness. There is more like generalized weakness. While in the ER blood sugar per EMS was 177. Vital signs in ED showed temperature 98.3 pulse rate of 100 respiratory 23 pressure 114/71 O2 sats 97% on room air When she was in the ED, EKG showed A. fib rate controlled She had a CT scan code neuro which was negative acute stroke or large vessel occlusion She was deemed not a tpa candidate per tele neuro. An MRI of the brain has been ordered and was pending The patient was in the room with her son. She feels like nothing is wrong with her. She had a recent right shoulder fracture for which she had been to rehab. She reports that the right shoulder still hurtful and also she is having nausea associated with this. Interval history / Subjective:  
 
Pt seen in room denies fevers or chills. No complaints today. Went to see patient again at 1 PM.  Per family she was having slurring speech. Patient noted to have perceivable slurred speech. Did not notice facial droop. Strength was equal in all 4 extremities. She was so weak that she was unable to be getting up from bed and with 2 nurses assistance. Blood glucose checked and okay. Blood pressure was stable. Ordered another CT scan of the head for follow-up. Her slurred speech is slowly resolving at this time. Patient is now reporting that food is getting stuck in her chest. 
Encouraged her to take more food given loss of weight and poor p.o. intake. Sons were updated Assessment & Plan:  
 
Slurred speech and generalized weakness possible due to TIA vs UTI 
-has slurred speech, possible due to dry tongue from dehydratin 
-continue on Eliquis, aspirin and pravachol 
-CT head no acute change - 9/26 and 9/27 
-CTA head/CT perfusion no acute large vessel occlusion  - 9/26 and 9/27 
-MRI chronic white matter disease with no acute infarction. - 9/26 and 9/27 
-lipid panel is normal 
-troponin <0.05 
-continue neuro checks 
-consult to speech therapist  
-Seen by Neurology - thinks from UTI and not realy a true seizure or CVA. 
-10/3 -had another episode of slurred speech. Check CT scan of the head now. Check UA resolution of UTI. Blood glucose okay. Closely monitor with neuro checks. Will request neuro relook. Check Orthostatics Thyroid cyst and abnormal thyroid function 
-2.6 cm cystic nodule right thyroid lobe on CTA head, not sure clinical significance -TSH elevated, free T4 normal, patient on amiodarone  
-repeat thyroid function in 4 weeks Dehydration due to poor oral intake and UTI 
-encourage po intake, normal saline at 100 ml/34 
-add multivitamin  
-monitor electrolyte UTI POA 
-urine cx showing pseudomonas - switch to zosyn Can use cipro however she has prolonged qtc and hence limited in options,Plan for 3 day course. Chronic A Fib, rate controlled 
-EKG atrial fibrillation vent rate 102 non specific st t wave 
-continue on amiodarone, metoprolol and eliquis  
-10/3, EKG checked which showed A. fib. Hyperglycemia on admission -A1c 5.6 
-no further intervention Hx of right humerus fracture s/p ORIF 
-has right shoulder pain 
-continue prn tylenol COVID 19 test on 9/29 negative Code status: DNR 
DVT prophylaxis: Eliquis Care Plan discussed with: Patient/Family, Nurse and  Anticipated Disposition: SNF Anticipated Discharge: 24 hours to 48 hours   On IV abx I discussed with her son at bedside, questions answered POA -  [place] , no answer. Hospital Problems  Never Reviewed Codes Class Noted POA  
 UTI (urinary tract infection) ICD-10-CM: N39.0 ICD-9-CM: 599.0  9/29/2020 Unknown  
   
 TIA (transient ischemic attack) ICD-10-CM: G45.9 ICD-9-CM: 435.9  9/27/2020 Unknown Vital Signs:  
 Last 24hrs VS reviewed since prior progress note. Most recent are: 
Visit Vitals /88 (BP 1 Location: Left arm, BP Patient Position: At rest) Pulse 97 Temp 97.7 °F (36.5 °C) Resp 18 Ht 5' 2\" (1.575 m) Wt 55.7 kg (122 lb 12.7 oz) SpO2 96% Breastfeeding No  
BMI 22.46 kg/m² Intake/Output Summary (Last 24 hours) at 10/3/2020 1717 Last data filed at 10/2/2020 1901 Gross per 24 hour Intake 554 ml Output  Net 554 ml Physical Examination:  
     
Gen:  No distress, alert HEENT:  Normal cephalic atraumatic, extra-occular movements are intact. Neck:  Supple, No JVD Lungs:  Clear bilaterally, no wheeze, no rales, normal effort Heart:  Regular Rate and Rhythm, normal S1 and S2, no edema Abdomen:  Soft, non tender, normal bowel sounds, no guarding. Extremities:  Well perfused, no cyanosis or edema Neurological:  Awake and alert, CN's are intact, normal strength throughout extremities, Slurred speech Skin:  No rashes or moles Mental Status:  Normal thought process, does not appear anxious Data Review:  
 Review and/or order of clinical lab test 
Review and/or order of tests in the radiology section of CPT Review and/or order of tests in the medicine section of CPT Labs:  
 
Recent Labs 10/01/20 
0113 WBC 9.3 HGB 13.0 HCT 39.0  Recent Labs 10/01/20 
0113 * K 3.8  
 CO2 23 BUN 13  
CREA 0.78 * CA 8.0* Recent Labs 10/01/20 
0113 ALT 24 AP 88 TBILI 0.7 TP 5.8* ALB 2.4*  
GLOB 3.4 No results for input(s): INR, PTP, APTT, INREXT, INREXT in the last 72 hours. No results for input(s): FE, TIBC, PSAT, FERR in the last 72 hours. No results found for: FOL, RBCF No results for input(s): PH, PCO2, PO2 in the last 72 hours. No results for input(s): CPK, CKNDX, TROIQ in the last 72 hours. No lab exists for component: CPK[facility] Lab Results Component Value Date/Time Cholesterol, total 123 09/28/2020 04:42 AM  
 HDL Cholesterol 53 09/28/2020 04:42 AM  
 LDL, calculated 55.4 09/28/2020 04:42 AM  
 Triglyceride 73 09/28/2020 04:42 AM  
 CHOL/HDL Ratio 2.3 09/28/2020 04:42 AM  
 
No results found for: [facility] Lab Results Component Value Date/Time Color YELLOW/STRAW 09/27/2020 11:10 PM  
 Appearance CLOUDY (A) 09/27/2020 11:10 PM  
 Specific gravity <1.005 09/27/2020 11:10 PM  
 pH (UA) 7.5 09/27/2020 11:10 PM  
 Protein TRACE (A) 09/27/2020 11:10 PM  
 Glucose Negative 09/27/2020 11:10 PM  
 Ketone Negative 09/27/2020 11:10 PM  
 Bilirubin Negative 09/27/2020 11:10 PM  
 Urobilinogen 1.0 09/27/2020 11:10 PM  
 Nitrites Positive (A) 09/27/2020 11:10 PM  
 Leukocyte Esterase MODERATE (A) 09/27/2020 11:10 PM  
 Epithelial cells FEW 09/27/2020 11:10 PM  
 Bacteria 3+ (A) 09/27/2020 11:10 PM  
 WBC >100 (H) 09/27/2020 11:10 PM  
 RBC 10-20 09/27/2020 11:10 PM  
 
 
 
Medications Reviewed:  
 
Current Facility-Administered Medications Medication Dose Route Frequency  prochlorperazine (COMPAZINE) with saline injection 10 mg  10 mg IntraVENous Q6H PRN  piperacillin-tazobactam (ZOSYN) 3.375 g in 0.9% sodium chloride (MBP/ADV) 100 mL  3.375 g IntraVENous Q8H  
 therapeutic multivitamin (THERAGRAN) tablet 1 Tab  1 Tab Oral DAILY  polyethylene glycol (MIRALAX) packet 17 g  17 g Oral DAILY  docusate sodium (COLACE) capsule 100 mg  100 mg Oral BID PRN  
 albuterol (PROVENTIL VENTOLIN) nebulizer solution 2.5 mg  2.5 mg Nebulization TID PRN  
 ondansetron (ZOFRAN) injection 4 mg  4 mg IntraVENous Q6H PRN  
 amiodarone (CORDARONE) tablet 200 mg  200 mg Oral DAILY  apixaban (ELIQUIS) tablet 2.5 mg  2.5 mg Oral BID  latanoprost (XALATAN) 0.005 % ophthalmic solution 1 Drop  1 Drop Both Eyes QHS  metoprolol tartrate (LOPRESSOR) tablet 50 mg  50 mg Oral QPM  
 pravastatin (PRAVACHOL) tablet 10 mg  10 mg Oral QHS  timolol (TIMOPTIC) 0.5 % ophthalmic solution 1 Drop  1 Drop Both Eyes BID  acetaminophen (TYLENOL) tablet 650 mg  650 mg Oral Q4H PRN  
 aspirin chewable tablet 81 mg  81 mg Oral DAILY  labetaloL (NORMODYNE;TRANDATE) injection 5 mg  5 mg IntraVENous Q10MIN PRN  
 
______________________________________________________________________ EXPECTED LENGTH OF STAY: 2d 0h 
ACTUAL LENGTH OF STAY:          4 
 
            
Mariah Gray MD

## 2020-10-03 NOTE — PROGRESS NOTES
Called Dr Tamika Hyatt to assess patient at approximately 1430hr due to noted change in patient's speech with slight facial droop. Son Sae Santos) at bedside, unable to understand anything patient was saying. CT Scan, & EKG ordered and obtained, no changes noted on either one. Straight cath X 1 obtained for re-eval of UTI, results show improvement, CBC & BMP also ordered & drawn with stable results except K+ level 3.0, 20 MEQ oral replacement ordered and given without complication. Patient in bed stable with normal speech at this time, will continue with close monitoring.

## 2020-10-03 NOTE — PROGRESS NOTES
Attempted to see pt. Off floor for head CT. Recurrent slurred speech. Low suspicion for concerning neurological etiology with multiple neg evaluations for the same. ?dry mouth? Fatigue? 89yo? She is on [facility] and [employer] already. Will attempt to see again in AM.

## 2020-10-04 NOTE — PROGRESS NOTES
Neurology Progress Note NAME: Omari Eastman :  5/15/1927 MRN:  [id number] DATE:  10/4/2020 Assessment:  
 
Active Problems: 
  TIA (transient ischemic attack) (2020) UTI (urinary tract infection) (2020) Pt is a 81yo female with Afib, on Eliquis, HLD, CAD, right humerus fx in August due to fall, admitted 20 after representing with diffuse weakness, tremor of all extremities, and dysarthria with prior episode on 20 with CTH, CTA H/N, CTP, and MRI brain neg on 20 and again 20. She was seen by neurology on 20 felt to have a non-focal exam and mild dysarthria at baseline, tremors felt to be chills or rigors from hypothermia, and pt being treated for UTI with Zosyn. She has been started on ASA 81mg daily in addition to Eliquis. LDL 55 on home dose of pravastatin. I am asked to see the pt again due to episode of dysarthria on 10/3/20. Attempted to see pt yesterday, but was in CT for repeat [facility] which is again, unremarkable. Exam with dysarthria, no focal deficits. Dysarthria related to poor fitting dentures and dry mouth. Plan: No further recommendations. Please do not reimage pt for dysarthria, only for focal neurological deficits such as facial droop, sudden extremity weakness or numbness, sudden alteration in mental status. Please call if needed. Subjective:  
 Pt tells me that she thought she was having a stroke yesterday b/c she had uncontrollable full body shaking. She has lower dentures that she reports are a problem, not fitting well and she needs to go to the dentist to get them fixed. She also c/o severe dry mouth and tells me that she is being treated for thrush.  She is looking forward to getting out of the hospital so she can \"learn to walk again\", though she may give up driving at this point, \"I'm getting too old. \" 
 
Objective:  
Chart reviewed since last seen Current Facility-Administered Medications Medication Dose Route Frequency  ondansetron (ZOFRAN) injection 4 mg  4 mg IntraVENous Q6H PRN  
 lactated Ringers infusion  75 mL/hr IntraVENous CONTINUOUS  
 nystatin (MYCOSTATIN) 100,000 unit/mL oral suspension 500,000 Units  500,000 Units Swish and Swallow QID  piperacillin-tazobactam (ZOSYN) 3.375 g in 0.9% sodium chloride (MBP/ADV) 100 mL  3.375 g IntraVENous Q8H  
 therapeutic multivitamin (THERAGRAN) tablet 1 Tab  1 Tab Oral DAILY  polyethylene glycol (MIRALAX) packet 17 g  17 g Oral DAILY  docusate sodium (COLACE) capsule 100 mg  100 mg Oral BID PRN  
 albuterol (PROVENTIL VENTOLIN) nebulizer solution 2.5 mg  2.5 mg Nebulization TID PRN  
 amiodarone (CORDARONE) tablet 200 mg  200 mg Oral DAILY  apixaban (ELIQUIS) tablet 2.5 mg  2.5 mg Oral BID  latanoprost (XALATAN) 0.005 % ophthalmic solution 1 Drop  1 Drop Both Eyes QHS  metoprolol tartrate (LOPRESSOR) tablet 50 mg  50 mg Oral QPM  
 pravastatin (PRAVACHOL) tablet 10 mg  10 mg Oral QHS  timolol (TIMOPTIC) 0.5 % ophthalmic solution 1 Drop  1 Drop Both Eyes BID  acetaminophen (TYLENOL) tablet 650 mg  650 mg Oral Q4H PRN  
 aspirin chewable tablet 81 mg  81 mg Oral DAILY  labetaloL (NORMODYNE;TRANDATE) injection 5 mg  5 mg IntraVENous Q10MIN PRN Visit Vitals BP (!) 147/88 (BP 1 Location: Left arm, BP Patient Position: At rest) Pulse (!) 104 Temp 97.4 °F (36.3 °C) Resp 16 Ht 5' 2\" (1.575 m) Wt 55.7 kg (122 lb 12.7 oz) SpO2 97% Breastfeeding No  
BMI 22.46 kg/m² Temp (24hrs), Av.6 °F (36.4 °C), Min:97.3 °F (36.3 °C), Max:98 °F (36.7 °C) 10/04 07 - 10/04 1900 In: 474 [P.O.:120; I.V.:354] Out: -  
10/02 190 - 10/04 0700 In: 940 [P.O.:120; I.V.:820] Out: - Physical Exam: 
General: Well developed well nourished elderly patient in no apparent distress. HEENT: Tip of tongue with bruise, pt denies biting tongue, lower dentures in place, no oral lesions seen. Extremities: 2+ Radial pulses, no cyanosis or edema Neurological Exam: 
Mental Status: Oriented to time, place and person. Speech - dysarthria \"hot potato voice\", and language intact. Attention and fund of knowledge appropriate. Normal recent and remote memory. Cranial Nerves:   PERRL, EOMI, no nystagmus, no ptosis. Facial movement is symmetric. Palate is midline. Tongue is midline. Hearing is impaired to normal conversation. Motor:  5/5 strength in upper and lower proximal and distal muscles with limited testing of right UE in sling. Normal bulk and tone. No tremors Reflexes:     
Sensory:     
Gait:  Non-ambulatory Cerebellar:    
   
 
Lab Review Recent Results (from the past 24 hour(s)) GLUCOSE, POC Collection Time: 10/03/20 12:52 PM  
Result Value Ref Range Glucose (POC) 155 (H) 65 - 100 mg/dL Performed by Sincere Turner EKG, 12 LEAD, INITIAL Collection Time: 10/03/20  1:32 PM  
Result Value Ref Range Ventricular Rate 106 BPM  
 Atrial Rate 117 BPM  
 QRS Duration 96 ms  
 Q-T Interval 370 ms QTC Calculation (Bezet) 491 ms Calculated R Axis 10 degrees Calculated T Axis -102 degrees Diagnosis Atrial fibrillation with rapid ventricular response ST & T wave abnormality, consider anterior ischemia or digitalis effect When compared with ECG of 01-OCT-2020 19:12, 
ST less depressed in Inferior leads Nonspecific T wave abnormality, worse in Inferior leads Nonspecific T wave abnormality has replaced inverted T waves in Lateral leads CBC WITH AUTOMATED DIFF Collection Time: 10/03/20  4:10 PM  
Result Value Ref Range WBC 9.1 3.6 - 11.0 K/uL  
 RBC 4.16 3.80 - 5.20 M/uL  
 HGB 13.8 11.5 - 16.0 g/dL HCT 40.5 35.0 - 47.0 % MCV 97.4 80.0 - 99.0 FL  
 MCH 33.2 26.0 - 34.0 PG  
 MCHC 34.1 30.0 - 36.5 g/dL  
 RDW 17.5 (H) 11.5 - 14.5 % PLATELET 605 693 - 784 K/uL MPV 10.6 8.9 - 12.9 FL  
 NRBC 0.4 (H) 0  WBC ABSOLUTE NRBC 0.04 (H) 0.00 - 0.01 K/uL NEUTROPHILS 74 32 - 75 % LYMPHOCYTES 11 (L) 12 - 49 % MONOCYTES 11 5 - 13 % EOSINOPHILS 3 0 - 7 % BASOPHILS 0 0 - 1 % IMMATURE GRANULOCYTES 1 (H) 0.0 - 0.5 % ABS. NEUTROPHILS 6.8 1.8 - 8.0 K/UL  
 ABS. LYMPHOCYTES 1.0 0.8 - 3.5 K/UL  
 ABS. MONOCYTES 1.0 0.0 - 1.0 K/UL  
 ABS. EOSINOPHILS 0.2 0.0 - 0.4 K/UL  
 ABS. BASOPHILS 0.0 0.0 - 0.1 K/UL  
 ABS. IMM. GRANS. 0.1 (H) 0.00 - 0.04 K/UL  
 DF AUTOMATED METABOLIC PANEL, BASIC Collection Time: 10/03/20  4:10 PM  
Result Value Ref Range Sodium 137 136 - 145 mmol/L Potassium 3.0 (L) 3.5 - 5.1 mmol/L Chloride 104 97 - 108 mmol/L  
 CO2 26 21 - 32 mmol/L Anion gap 7 5 - 15 mmol/L Glucose 132 (H) 65 - 100 mg/dL BUN 12 6 - 20 MG/DL Creatinine 0.90 0.55 - 1.02 MG/DL  
 BUN/Creatinine ratio 13 12 - 20 GFR est AA >60 >60 ml/min/1.73m2 GFR est non-AA 58 (L) >60 ml/min/1.73m2 Calcium 8.2 (L) 8.5 - 10.1 MG/DL URINALYSIS W/MICROSCOPIC Collection Time: 10/03/20  4:18 PM  
Result Value Ref Range Color YELLOW/STRAW Appearance CLEAR CLEAR Specific gravity 1.013 1.003 - 1.030    
 pH (UA) 6.5 5.0 - 8.0 Protein Negative NEG mg/dL Glucose Negative NEG mg/dL Ketone Negative NEG mg/dL Bilirubin Negative NEG Blood MODERATE (A) NEG Urobilinogen 0.2 0.2 - 1.0 EU/dL Nitrites Negative NEG Leukocyte Esterase LARGE (A) NEG    
 WBC 20-50 0 - 4 /hpf  
 RBC 0-5 0 - 5 /hpf Epithelial cells FEW FEW /lpf Bacteria Negative NEG /hpf URINE CULTURE HOLD SAMPLE Collection Time: 10/03/20  4:18 PM  
 Specimen: Serum Result Value Ref Range Urine culture hold Urine on hold in Microbiology dept for 2 days. If unpreserved urine is submitted, it cannot be used for addtional testing after 24 hours, recollection will be required.   
METABOLIC PANEL, BASIC  
 Collection Time: 10/04/20  1:24 AM  
Result Value Ref Range Sodium 138 136 - 145 mmol/L Potassium 3.1 (L) 3.5 - 5.1 mmol/L Chloride 105 97 - 108 mmol/L  
 CO2 25 21 - 32 mmol/L Anion gap 8 5 - 15 mmol/L Glucose 104 (H) 65 - 100 mg/dL BUN 10 6 - 20 MG/DL Creatinine 0.79 0.55 - 1.02 MG/DL  
 BUN/Creatinine ratio 13 12 - 20 GFR est AA >60 >60 ml/min/1.73m2 GFR est non-AA >60 >60 ml/min/1.73m2 Calcium 8.3 (L) 8.5 - 10.1 MG/DL  
CBC WITH AUTOMATED DIFF Collection Time: 10/04/20  1:24 AM  
Result Value Ref Range WBC 8.8 3.6 - 11.0 K/uL  
 RBC 3.98 3.80 - 5.20 M/uL  
 HGB 13.1 11.5 - 16.0 g/dL HCT 39.2 35.0 - 47.0 % MCV 98.5 80.0 - 99.0 FL  
 MCH 32.9 26.0 - 34.0 PG  
 MCHC 33.4 30.0 - 36.5 g/dL  
 RDW 17.8 (H) 11.5 - 14.5 % PLATELET 363 385 - 586 K/uL MPV 10.6 8.9 - 12.9 FL  
 NRBC 0.2 (H) 0  WBC ABSOLUTE NRBC 0.02 (H) 0.00 - 0.01 K/uL NEUTROPHILS 73 32 - 75 % LYMPHOCYTES 12 12 - 49 % MONOCYTES 10 5 - 13 % EOSINOPHILS 3 0 - 7 % BASOPHILS 1 0 - 1 % IMMATURE GRANULOCYTES 1 (H) 0.0 - 0.5 % ABS. NEUTROPHILS 6.4 1.8 - 8.0 K/UL  
 ABS. LYMPHOCYTES 1.0 0.8 - 3.5 K/UL  
 ABS. MONOCYTES 0.9 0.0 - 1.0 K/UL  
 ABS. EOSINOPHILS 0.3 0.0 - 0.4 K/UL  
 ABS. BASOPHILS 0.0 0.0 - 0.1 K/UL  
 ABS. IMM. GRANS. 0.1 (H) 0.00 - 0.04 K/UL  
 DF AUTOMATED Additional comments: 
I have reviewed the patient's new clinical lab test results. I have personally reviewed the patient's radiographs. CT scan CT Results (most recent): 
Results from [facility] - MARIA ALEJANDRA Encounter encounter on 09/27/20 CT HEAD WO CONT Narrative EXAM:  CT HEAD WO CONT INDICATION:   speech difficulty COMPARISON: CT head 9/27/2020, MRI brain 9/27/2020. TECHNIQUE: Unenhanced CT of the head was performed using 5 mm images. Brain and 
bone windows were generated. CT dose reduction was achieved through use of a 
standardized protocol tailored for this examination and automatic exposure control for dose modulation. FINDINGS: 
Unchanged generalized parenchymal volume loss with commensurate dilation of the 
sulci and ventricular system. Unchanged periventricular and deep white matter 
hypodensities, consistent with chronic microangiopathic ischemic changes. Basilar cisterns are patent. No midline shift. There is no evidence of acute 
infarct, hemorrhage, or extraaxial fluid collection. The paranasal sinuses, mastoid air cells, and middle ears are clear. The orbital 
contents are within normal limits with bilateral lens implants. There are no 
significant osseous or extracranial soft tissue lesions. Impression IMPRESSION: 
1. No evidence of acute intracranial abnormality. 2. Unchanged generalized parenchymal volume loss and chronic microvascular 
ischemic disease. Care Plan discussed with: 
Patient x Family RN Care Manager Consultant/Specialist:    
 
Signed: Vargas Case MD

## 2020-10-04 NOTE — PROGRESS NOTES
Dr Clemente Snow spoke with Patient's Son Steven Carrillo) via telephone per patient's son's request to discuss patient's continued dysphagia for past month. Md discussed possible barium swallow for tomorrow to eval, no orders received at this time.

## 2020-10-04 NOTE — PROGRESS NOTES
[address] [facility] Adult  Hospitalist Group Hospitalist Progress Note Ke Barry MD 
Answering service: [phone] OR [phone] from in house phone Date of Service:  10/4/2020 NAME:  Valentino Ohs :  5/15/1927 MRN:  [id number] Admission Summary:  
 
Valentino Ohs is a 80 y.o. female who presents with slurred speech and weakness. Patient was seen in ER one day ago for stroke symptoms and worked up including had an MRI which was negative for acute stroke. She was discharged home and she presents back with persistent slurred speech and weakness. There was no report of facial asymmetry or droop, focal hand or leg weakness. There is more like generalized weakness. While in the ER blood sugar per EMS was 177. Vital signs in ED showed temperature 98.3 pulse rate of 100 respiratory 23 pressure 114/71 O2 sats 97% on room air When she was in the ED, EKG showed A. fib rate controlled She had a CT scan code neuro which was negative acute stroke or large vessel occlusion She was deemed not a tpa candidate per tele neuro. An MRI of the brain has been ordered and was pending The patient was in the room with her son. She feels like nothing is wrong with her. She had a recent right shoulder fracture for which she had been to rehab. She reports that the right shoulder still hurtful and also she is having nausea associated with this. Interval history / Subjective:  
 
Pt seen in room denies fevers or chills. No complaints today. She denies any pain or complaints today. Denies Slurred speech. Assessment & Plan:  
 
Slurred speech and generalized weakness possible due to TIA vs UTI 
-has slurred speech, possible due to dry tongue from dehydratin 
-continue on Eliquis, aspirin and pravachol 
-CT head no acute change -  and  -CTA head/CT perfusion no acute large vessel occlusion  - 9/26 and 9/27 
-MRI chronic white matter disease with no acute infarction. - 9/26 and 9/27 
-lipid panel is normal 
-troponin <0.05 
-continue neuro checks 
-consult to speech therapist  
-Seen by Neurology - thinks from UTI and not realy a true seizure or CVA. Thyroid cyst and abnormal thyroid function 
-2.6 cm cystic nodule right thyroid lobe on CTA head, not sure clinical significance -TSH elevated, free T4 normal, patient on amiodarone  
-repeat thyroid function in 4 weeks Dehydration due to poor oral intake and UTI 
-encourage po intake, normal saline at 100 ml/34 
-add multivitamin  
-monitor electrolyte UTI POA 
-urine cx showing pseudomonas - switch to zosyn - Recheck Culture per family request 10/4 Can use cipro however she has prolonged qtc and hence limited in options,Plan for 3 day course. Chronic A Fib, rate controlled 
-EKG atrial fibrillation vent rate 102 non specific st t wave 
-continue on amiodarone, metoprolol and eliquis  
-10/3, EKG checked which showed A. fib. Hyperglycemia on admission -A1c 5.6 
-no further intervention Hx of right humerus fracture s/p ORIF 
-has right shoulder pain 
-continue prn tylenol COVID 19 test on 9/29 negative Code status: DNR 
DVT prophylaxis: Eliquis Care Plan discussed with: Patient/Family, Nurse and  Anticipated Disposition: SNF Anticipated Discharge: 24 hours to 48 hours   On IV abx 
 
10/1,10/2,3,4 D/w Jose Mendez  
 
-10/3 -had another episode of slurred speech. Check CT scan of the head now. Check UA resolution of UTI. Blood glucose okay. Closely monitor with neuro checks. Will request neuro relook. Check Orthostatics  
-10/4 - Seen By neurology doesn't think stroke. Seen by GI , D/w Son he would like a Barium study - ordered. Also check Urine culture for resolution of UTI. C/w ABX. Son would like increased appetite - discussed about remeron in gareement after discussing side effect profile Hospital Problems  Never Reviewed Codes Class Noted POA  
 UTI (urinary tract infection) ICD-10-CM: N39.0 ICD-9-CM: 599.0  9/29/2020 Unknown  
   
 TIA (transient ischemic attack) ICD-10-CM: G45.9 ICD-9-CM: 435.9  9/27/2020 Unknown Vital Signs:  
 Last 24hrs VS reviewed since prior progress note. Most recent are: 
Visit Vitals BP (!) 147/88 (BP 1 Location: Left arm, BP Patient Position: At rest) Pulse (!) 104 Temp 97.4 °F (36.3 °C) Resp 16 Ht 5' 2\" (1.575 m) Wt 55.7 kg (122 lb 12.7 oz) SpO2 97% Breastfeeding No  
BMI 22.46 kg/m² Intake/Output Summary (Last 24 hours) at 10/4/2020 1420 Last data filed at 10/4/2020 7430 Gross per 24 hour Intake 920 ml Output  Net 920 ml Physical Examination:  
     
Gen:  No distress, alert HEENT:  Normal cephalic atraumatic, extra-occular movements are intact. Neck:  Supple, No JVD Lungs:  Clear bilaterally, no wheeze, no rales, normal effort Heart:  Regular Rate and Rhythm, normal S1 and S2, no edema Abdomen:  Soft, non tender, normal bowel sounds, no guarding. Extremities:  Well perfused, no cyanosis or edema Neurological:  Awake and alert, CN's are intact, normal strength throughout extremities, Slurred speech Skin:  No rashes or moles Mental Status:  Normal thought process, does not appear anxious Data Review:  
 Review and/or order of clinical lab test 
Review and/or order of tests in the radiology section of CPT Review and/or order of tests in the medicine section of CPT Labs:  
 
Recent Labs 10/04/20 
0124 10/03/20 
1610 WBC 8.8 9.1 HGB 13.1 13.8 HCT 39.2 40.5  202 Recent Labs 10/04/20 
0124 10/03/20 
1610  137  
K 3.1* 3.0*  
 104 CO2 25 26 BUN 10 12 CREA 0.79 0.90 * 132* CA 8.3* 8.2*  
 
 No results for input(s): ALT, AP, TBIL, TBILI, TP, ALB, GLOB, GGT, AML, LPSE in the last 72 hours. No lab exists for component: SGOT, GPT, AMYP, HLPSE No results for input(s): INR, PTP, APTT, INREXT, INREXT in the last 72 hours. No results for input(s): FE, TIBC, PSAT, FERR in the last 72 hours. No results found for: FOL, RBCF No results for input(s): PH, PCO2, PO2 in the last 72 hours. No results for input(s): CPK, CKNDX, TROIQ in the last 72 hours. No lab exists for component: CPKMB Lab Results Component Value Date/Time Cholesterol, total 123 09/28/2020 04:42 AM  
 HDL Cholesterol 53 09/28/2020 04:42 AM  
 LDL, calculated 55.4 09/28/2020 04:42 AM  
 Triglyceride 73 09/28/2020 04:42 AM  
 CHOL/HDL Ratio 2.3 09/28/2020 04:42 AM  
 
Lab Results Component Value Date/Time Glucose (POC) 166 (H) 10/04/2020 11:04 AM  
 Glucose (POC) 155 (H) 10/03/2020 12:52 PM  
 
Lab Results Component Value Date/Time Color YELLOW/STRAW 10/03/2020 04:18 PM  
 Appearance CLEAR 10/03/2020 04:18 PM  
 Specific gravity 1.013 10/03/2020 04:18 PM  
 Specific gravity <1.005 09/27/2020 11:10 PM  
 pH (UA) 6.5 10/03/2020 04:18 PM  
 Protein Negative 10/03/2020 04:18 PM  
 Glucose Negative 10/03/2020 04:18 PM  
 Ketone Negative 10/03/2020 04:18 PM  
 Bilirubin Negative 10/03/2020 04:18 PM  
 Urobilinogen 0.2 10/03/2020 04:18 PM  
 Nitrites Negative 10/03/2020 04:18 PM  
 Leukocyte Esterase LARGE (A) 10/03/2020 04:18 PM  
 Epithelial cells FEW 10/03/2020 04:18 PM  
 Bacteria Negative 10/03/2020 04:18 PM  
 WBC 20-50 10/03/2020 04:18 PM  
 RBC 0-5 10/03/2020 04:18 PM  
 
 
 
Medications Reviewed:  
 
Current Facility-Administered Medications Medication Dose Route Frequency  potassium chloride SR (KLOR-CON 10) tablet 20 mEq  20 mEq Oral NOW  ondansetron (ZOFRAN) injection 4 mg  4 mg IntraVENous Q6H PRN  
 lactated Ringers infusion  75 mL/hr IntraVENous CONTINUOUS  
  nystatin (MYCOSTATIN) 100,000 unit/mL oral suspension 500,000 Units  500,000 Units Swish and Swallow QID  piperacillin-tazobactam (ZOSYN) 3.375 g in 0.9% sodium chloride (MBP/ADV) 100 mL  3.375 g IntraVENous Q8H  
 therapeutic multivitamin (THERAGRAN) tablet 1 Tab  1 Tab Oral DAILY  polyethylene glycol (MIRALAX) packet 17 g  17 g Oral DAILY  docusate sodium (COLACE) capsule 100 mg  100 mg Oral BID PRN  
 albuterol (PROVENTIL VENTOLIN) nebulizer solution 2.5 mg  2.5 mg Nebulization TID PRN  
 amiodarone (CORDARONE) tablet 200 mg  200 mg Oral DAILY  apixaban (ELIQUIS) tablet 2.5 mg  2.5 mg Oral BID  latanoprost (XALATAN) 0.005 % ophthalmic solution 1 Drop  1 Drop Both Eyes QHS  metoprolol tartrate (LOPRESSOR) tablet 50 mg  50 mg Oral QPM  
 pravastatin (PRAVACHOL) tablet 10 mg  10 mg Oral QHS  timolol (TIMOPTIC) 0.5 % ophthalmic solution 1 Drop  1 Drop Both Eyes BID  acetaminophen (TYLENOL) tablet 650 mg  650 mg Oral Q4H PRN  
 aspirin chewable tablet 81 mg  81 mg Oral DAILY  labetaloL (NORMODYNE;TRANDATE) injection 5 mg  5 mg IntraVENous Q10MIN PRN  
 
______________________________________________________________________ EXPECTED LENGTH OF STAY: 2d 0h 
ACTUAL LENGTH OF STAY:          5 
 
            
Ke Barry MD

## 2020-10-04 NOTE — CONSULTS
[facility] [facility] 
[address] [facility], [address] GASTROENTEROLOGY CONSULTATION NOTE 
   
 
NAME:  Roxana De León :   5/15/1927 MRN:   [id number] Consult Date: 10/4/2020 10:27 AM 
 
 
History of Present Illness:  Patient is a 80 y.o. who is seen in consultation at the request of Dr. Minor Clark for dysphagia. She has a PMH as below. She presented with symptoms of a stroke and work up was negative for stroke. Consult request is for dysphagia. She reports that she had trouble swallowing in the past, but not currently. Per SLP, regular diet with thin liquids was recommended. Nutrition has evaluated patient and she meets criteria for chronic, severe, protein calori malnutrition. PMH: 
Past Medical History:  
Diagnosis Date  CAD (coronary artery disease)  Gastrointestinal disorder PSH: 
History reviewed. No pertinent surgical history. Allergies: Allergies Allergen Reactions  Ambien [Zolpidem] Unknown (comments)  Fosamax [Alendronate] Unknown (comments) Home Medications: 
Prior to Admission Medications Prescriptions Last Dose Informant Patient Reported? Taking?  
acetaminophen (TYLENOL) 500 mg tablet   Yes No  
Sig: Take 500 mg by mouth every eight (8) hours. albuterol (PROVENTIL HFA, VENTOLIN HFA, PROAIR HFA) 90 mcg/actuation inhaler   Yes No  
Sig: Take 1 Puff by inhalation three (3) times daily as needed for Shortness of Breath. amiodarone (CORDARONE) 200 mg tablet   Yes No  
Sig: Take 200 mg by mouth daily. apixaban (Eliquis) 2.5 mg tablet   Yes No  
Sig: Take 2.5 mg by mouth two (2) times a day. calcium carbonate (TUMS) 200 mg calcium (500 mg) chew   Yes No  
Sig: Take 1 Tab by mouth two (2) times a day. latanoprost (XALATAN) 0.005 % ophthalmic solution   Yes No  
Sig: Administer 1 Drop to both eyes nightly. metoprolol tartrate (LOPRESSOR) 50 mg tablet   Yes No  
Sig: Take 50 mg by mouth every evening. multivitamins-minerals-lutein (MEN'S CENTRUM SILVER WITH LUTEIN) tab tablet   Yes No  
Sig: Take 1 Tab by mouth daily. pravastatin (PRAVACHOL) 10 mg tablet   Yes No  
Sig: Take 10 mg by mouth nightly. timolol (TIMOPTIC) 0.5 % ophthalmic solution   Yes No  
Sig: Administer 1 Drop to both eyes two (2) times a day. Facility-Administered Medications: None Hospital Medications: 
Current Facility-Administered Medications Medication Dose Route Frequency  ondansetron (ZOFRAN) injection 4 mg  4 mg IntraVENous Q6H PRN  
 lactated Ringers infusion  75 mL/hr IntraVENous CONTINUOUS  
 nystatin (MYCOSTATIN) 100,000 unit/mL oral suspension 500,000 Units  500,000 Units Swish and Swallow QID  piperacillin-tazobactam (ZOSYN) 3.375 g in 0.9% sodium chloride (MBP/ADV) 100 mL  3.375 g IntraVENous Q8H  
 therapeutic multivitamin (THERAGRAN) tablet 1 Tab  1 Tab Oral DAILY  polyethylene glycol (MIRALAX) packet 17 g  17 g Oral DAILY  docusate sodium (COLACE) capsule 100 mg  100 mg Oral BID PRN  
 albuterol (PROVENTIL VENTOLIN) nebulizer solution 2.5 mg  2.5 mg Nebulization TID PRN  
 amiodarone (CORDARONE) tablet 200 mg  200 mg Oral DAILY  apixaban (ELIQUIS) tablet 2.5 mg  2.5 mg Oral BID  latanoprost (XALATAN) 0.005 % ophthalmic solution 1 Drop  1 Drop Both Eyes QHS  metoprolol tartrate (LOPRESSOR) tablet 50 mg  50 mg Oral QPM  
 pravastatin (PRAVACHOL) tablet 10 mg  10 mg Oral QHS  timolol (TIMOPTIC) 0.5 % ophthalmic solution 1 Drop  1 Drop Both Eyes BID  acetaminophen (TYLENOL) tablet 650 mg  650 mg Oral Q4H PRN  
 aspirin chewable tablet 81 mg  81 mg Oral DAILY  labetaloL (NORMODYNE;TRANDATE) injection 5 mg  5 mg IntraVENous Q10MIN PRN Social History: 
Social History Tobacco Use  Smoking status: Never Smoker  Smokeless tobacco: Never Used Substance Use Topics  Alcohol use: Not Currently Family History: 
History reviewed. No pertinent family history. Review of Systems: 
Constitutional: negative fever, negative chills, negative weight loss Eyes:   negative visual changes ENT:   negative sore throat, tongue or lip swelling Respiratory:  negative cough, negative dyspnea Cards:  negative for chest pain, palpitations, lower extremity edema GI:   See HPI 
:  negative for frequency, dysuria Integument:  negative for rash and pruritus Heme:  negative for easy bruising and gum/nose bleeding Musculoskel: negative for myalgias,  back pain and muscle weakness Neuro: negative for headaches, dizziness, vertigo Psych:  negative for feelings of anxiety, depression Objective:  
 
Patient Vitals for the past 8 hrs: 
 BP Temp Pulse Resp SpO2  
10/04/20 0751 (!) 147/88 97.4 °F (36.3 °C) (!) 104 16 97 % 10/04 0701 - 10/04 1900 In: 474 [P.O.:120; I.V.:354] Out: -  
10/02 1901 - 10/04 0700 In: 940 [P.O.:120; I.V.:820] Out: - PHYSICAL EXAM: 
General: WD, WN. Alert, cooperative, no acute distress   
HEENT: NC, Atraumatic. Anicteric sclerae. Lungs:  CTA Bilaterally. No Wheezing/Rhonchi/Rales. Heart:  Regular  rhythm,  No murmur (), No Rubs, No Gallops Abdomen: Soft, Non distended, Non tender.  +Bowel sounds, no HSM Extremities: No c/c/e Neurologic:  CN 2-12 gi, Alert and oriented X 3. No acute neurological distress Psych:   Good insight. Not anxious nor agitated. Data Review Recent Labs 10/04/20 
0124 10/03/20 
1610 WBC 8.8 9.1 HGB 13.1 13.8 HCT 39.2 40.5  202 Recent Labs 10/04/20 
0124 10/03/20 
1610  137  
K 3.1* 3.0*  
 104 CO2 25 26 BUN 10 12 CREA 0.79 0.90 * 132* CA 8.3* 8.2* No results for input(s): AP, TBIL, TP, ALB, GLOB, GGT, AML, LPSE in the last 72 hours. No lab exists for component: SGOT, GPT, AMYP, HLPSE No results for input(s): INR, PTP, APTT, INREXT in the last 72 hours. Assessment: · Dysphagia - unclear history regarding this.  She reports that she had dysphagia in the past, but not currently. Per SLP, diet recommendations have been made. I discussed option for barium esophagram/UGI series vs EGD. She is declining further work up at this time. Plan: · Diet per SLP · Please call with any questions. We will sign off for now. Signed By: Priscilla Sanz. Elsy Hall MD   
 10/4/2020  10:27 AM 
  
 
 
Addendum: I was contacted earlier today after seeing the patient by the nurse who conveyed that the patient's son would like to speak to me. He was not initially present when I met the patient. I spoke to the son on the phone and he has requested further work up for her dysphagia. We agreed to proceed with UGI series first considering her dysphagia, nausea and vomiting. Based on results, we can consider EGD vs NM GES next. Please keep NPO after midnight. Davi Hall MD

## 2020-10-05 NOTE — PROGRESS NOTES
[address] [facility] Adult  Hospitalist Group Hospitalist Progress Note Francisco Pollard MD 
Answering service: [phone] OR [phone] from in house phone Date of Service:  10/5/2020 NAME:  Morgan North :  5/15/1927 MRN:  [id number] Admission Summary:  
 
Morgan North is a 80 y.o. female who presents with slurred speech and weakness. Patient was seen in ER one day ago for stroke symptoms and worked up including had an MRI which was negative for acute stroke. She was discharged home and she presents back with persistent slurred speech and weakness. There was no report of facial asymmetry or droop, focal hand or leg weakness. There is more like generalized weakness. While in the ER blood sugar per EMS was 177. Vital signs in ED showed temperature 98.3 pulse rate of 100 respiratory 23 pressure 114/71 O2 sats 97% on room air When she was in the ED, EKG showed A. fib rate controlled She had a CT scan code neuro which was negative acute stroke or large vessel occlusion She was deemed not a tpa candidate per tele neuro. An MRI of the brain has been ordered and was pending The patient was in the room with her son. She feels like nothing is wrong with her. She had a recent right shoulder fracture for which she had been to rehab. She reports that the right shoulder still hurtful and also she is having nausea associated with this. Interval history / Subjective:  
 
Pt seen in room denies fevers or chills. No complaints today. She denies any pain or complaints today. Denies Slurred speech. Trying to eat, bur reports poor appetite. Denies Fevers Back from Barium swallow Assessment & Plan:  
 
Slurred speech and generalized weakness possible due to TIA vs UTI 
-has slurred speech, possible due to dry tongue from dehydratin 
-continue on Eliquis, aspirin and pravachol -CT head no acute change - 9/26 and 9/27 
-CTA head/CT perfusion no acute large vessel occlusion  - 9/26 and 9/27 
-MRI chronic white matter disease with no acute infarction. - 9/26 and 9/27 
-lipid panel is normal 
-troponin <0.05 
-continue neuro checks 
-consult to speech therapist  
-Seen by Neurology - thinks from UTI and not realy a true seizure or CVA. -Continue to monitor Thyroid cyst and abnormal thyroid function 
-2.6 cm cystic nodule right thyroid lobe on CTA head, not sure clinical significance -TSH elevated, free T4 normal, patient on amiodarone  
-repeat thyroid function in 4 weeks Dehydration due to poor oral intake and UTI 
-encourage po intake, normal saline at 100 ml/34 
-add multivitamin  
-monitor electrolyte UTI POA 
-urine cx showing pseudomonas - switch to zosyn - Recheck Culture per family request 10/4 Can use cipro however she has prolonged qtc and hence limited in options,Plan for 3 day course. Chronic A Fib, rate controlled 
-EKG atrial fibrillation vent rate 102 non specific st t wave 
-continue on amiodarone, metoprolol and eliquis  
-10/3, EKG checked which showed A. fib. Hyperglycemia on admission -A1c 5.6 
-no further intervention Hx of right humerus fracture s/p ORIF 
-has right shoulder pain 
-continue prn tylenol COVID 19 test on 9/29 negative Code status: DNR 
DVT prophylaxis: Eliquis Care Plan discussed with: Patient/Family, Nurse and  Anticipated Disposition: SNF Anticipated Discharge: 24 hours to 48 hours   On IV abx 
 
10/1,10/2,3,4 D/w Jose Slater [phone] 
 
-10/3 -had another episode of slurred speech. Check CT scan of the head now. Check UA resolution of UTI. Blood glucose okay. Closely monitor with neuro checks. Will request neuro relook. Check Orthostatics  
-10/4 - Seen By neurology doesn't think stroke. Seen by ABBY D/w Son he would like a Barium study - ordered.  Also check Urine culture for resolution of UTI. C/w ABX. Son would like increased appetite - discussed about remeron in gareement after discussing side effect profile 10/5- Had [address] study today. GI following Request palliative care eval to discuss about goals of care. D/w Son at bedside Hospital Problems  Never Reviewed Codes Class Noted POA  
 UTI (urinary tract infection) ICD-10-CM: N39.0 ICD-9-CM: 599.0  9/29/2020 Unknown  
   
 TIA (transient ischemic attack) ICD-10-CM: G45.9 ICD-9-CM: 435.9  9/27/2020 Unknown Vital Signs:  
 Last 24hrs VS reviewed since prior progress note. Most recent are: 
Visit Vitals /79 Pulse (!) 102 Temp 98 °F (36.7 °C) Resp 19 Ht 5' 2\" (1.575 m) Wt 55.7 kg (122 lb 12.7 oz) SpO2 94% Breastfeeding No  
BMI 22.46 kg/m² Intake/Output Summary (Last 24 hours) at 10/5/2020 1234 Last data filed at 10/4/2020 1908 Gross per 24 hour Intake 595 ml Output  Net 595 ml Physical Examination:  
     
Gen:  No distress, alert HEENT:  Normal cephalic atraumatic, extra-occular movements are intact. Neck:  Supple, No JVD Lungs:  Clear bilaterally, no wheeze, no rales, normal effort Heart:  Regular Rate and Rhythm, normal S1 and S2, no edema Abdomen:  Soft, non tender, normal bowel sounds, no guarding. Extremities:  Well perfused, no cyanosis or edema Neurological:  Awake and alert, CN's are intact, normal strength throughout extremities, Slurred speech Skin:  No rashes or moles Mental Status:  Normal thought process, does not appear anxious Data Review:  
 Review and/or order of clinical lab test 
Review and/or order of tests in the radiology section of CPT Review and/or order of tests in the medicine section of CPT Labs:  
 
Recent Labs 10/04/20 
0124 10/03/20 
1610 WBC 8.8 9.1 HGB 13.1 13.8 HCT 39.2 40.5  202 Recent Labs 10/04/20 
0124 10/03/20 
1610  137  
K 3.1* 3.0*  
 104 CO2 25 26 BUN 10 12 CREA 0.79 0.90 * 132* CA 8.3* 8.2* No results for input(s): ALT, AP, TBIL, TBILI, TP, ALB, GLOB, GGT, AML, LPSE in the last 72 hours. No lab exists for component: SGOT, GPT, AMYP, HLPSE No results for input(s): INR, PTP, APTT, INREXT, INREXT in the last 72 hours. No results for input(s): FE, TIBC, PSAT, FERR in the last 72 hours. No results found for: FOL, RBCF No results for input(s): PH, PCO2, PO2 in the last 72 hours. No results for input(s): CPK, CKNDX, TROIQ in the last 72 hours. No lab exists for component: CPKMB Lab Results Component Value Date/Time Cholesterol, total 123 09/28/2020 04:42 AM  
 HDL Cholesterol 53 09/28/2020 04:42 AM  
 LDL, calculated 55.4 09/28/2020 04:42 AM  
 Triglyceride 73 09/28/2020 04:42 AM  
 CHOL/HDL Ratio 2.3 09/28/2020 04:42 AM  
 
Lab Results Component Value Date/Time Glucose (POC) 166 (H) 10/04/2020 11:04 AM  
 Glucose (POC) 155 (H) 10/03/2020 12:52 PM  
 
Lab Results Component Value Date/Time Color YELLOW/STRAW 10/03/2020 04:18 PM  
 Appearance CLEAR 10/03/2020 04:18 PM  
 Specific gravity 1.013 10/03/2020 04:18 PM  
 Specific gravity <1.005 09/27/2020 11:10 PM  
 pH (UA) 6.5 10/03/2020 04:18 PM  
 Protein Negative 10/03/2020 04:18 PM  
 Glucose Negative 10/03/2020 04:18 PM  
 Ketone Negative 10/03/2020 04:18 PM  
 Bilirubin Negative 10/03/2020 04:18 PM  
 Urobilinogen 0.2 10/03/2020 04:18 PM  
 Nitrites Negative 10/03/2020 04:18 PM  
 Leukocyte Esterase LARGE (A) 10/03/2020 04:18 PM  
 Epithelial cells FEW 10/03/2020 04:18 PM  
 Bacteria Negative 10/03/2020 04:18 PM  
 WBC 20-50 10/03/2020 04:18 PM  
 RBC 0-5 10/03/2020 04:18 PM  
 
 
 
Medications Reviewed:  
 
Current Facility-Administered Medications Medication Dose Route Frequency  mirtazapine (REMERON) tablet 7.5 mg  7.5 mg Oral QHS  lactobac ac& pc-s.therm-b.anim (ISABELLE Q/RISAQUAD)  1 Cap Oral DAILY  ondansetron (ZOFRAN) injection 4 mg  4 mg IntraVENous Q6H PRN  
 lactated Ringers infusion  75 mL/hr IntraVENous CONTINUOUS  
 nystatin (MYCOSTATIN) 100,000 unit/mL oral suspension 500,000 Units  500,000 Units Swish and Swallow QID  piperacillin-tazobactam (ZOSYN) 3.375 g in 0.9% sodium chloride (MBP/ADV) 100 mL  3.375 g IntraVENous Q8H  
 therapeutic multivitamin (THERAGRAN) tablet 1 Tab  1 Tab Oral DAILY  polyethylene glycol (MIRALAX) packet 17 g  17 g Oral DAILY  docusate sodium (COLACE) capsule 100 mg  100 mg Oral BID PRN  
 albuterol (PROVENTIL VENTOLIN) nebulizer solution 2.5 mg  2.5 mg Nebulization TID PRN  
 amiodarone (CORDARONE) tablet 200 mg  200 mg Oral DAILY  apixaban (ELIQUIS) tablet 2.5 mg  2.5 mg Oral BID  latanoprost (XALATAN) 0.005 % ophthalmic solution 1 Drop  1 Drop Both Eyes QHS  metoprolol tartrate (LOPRESSOR) tablet 50 mg  50 mg Oral QPM  
 pravastatin (PRAVACHOL) tablet 10 mg  10 mg Oral QHS  timolol (TIMOPTIC) 0.5 % ophthalmic solution 1 Drop  1 Drop Both Eyes BID  acetaminophen (TYLENOL) tablet 650 mg  650 mg Oral Q4H PRN  
 aspirin chewable tablet 81 mg  81 mg Oral DAILY  labetaloL (NORMODYNE;TRANDATE) injection 5 mg  5 mg IntraVENous Q10MIN PRN  
 
______________________________________________________________________ EXPECTED LENGTH OF STAY: 2d 0h 
ACTUAL LENGTH OF STAY:          6 
 
            
Anjum Mcmanus MD

## 2020-10-05 NOTE — PROGRESS NOTES
Problem: Dysphagia (Adult) Goal: *Acute Goals and Plan of Care (Insert Text) Description: Speech therapy goals Initiated 10/5/2020 1. Patient will participate in re-evaluation of swallow function within 7 days Outcome: Progressing Towards Goal 
 SPEECH LANGUAGE PATHOLOGY BEDSIDE SWALLOW EVALUATION Patient: Anup Miller (68 y.o. female) Date: 10/5/2020 Primary Diagnosis: TIA (transient ischemic attack) [G45.9] UTI (urinary tract infection) [N39.0] Precautions:   Fall(RUE sling s/p ORIF approx 4 wks ago) ASSESSMENT : 
Based on the objective data described below, the patient presents with oropharyngeal swallow appearing essentially functional for limited trials, however, suspected esophageal dysphagia with patient reporting the feeling of thing getting stuck in the middle of her esophagus and then backing up and coming back up. Audible regurgitation was noted on 1/2 trials of thin liquids with patient belching and coughing afterwards. Son reports this did not happen with lunch tray which was finished just before SLP entry, but only took ~4bites of soup before reporting it was stuck and declining further intake. Note aspiration was observed on UGI study, however, study was very limited per radiologist report \"The examination is significantly limited by patient immobility and inability to drink. Effervescent crystals prompted a gurgling cough, the patient aspirated 
thin barium, and the examination was terminated. Limited evaluation of the 
esophagus and stomach reveals no gross gastroesophageal abnormality. \" Question esophageal etiology to deficits in light of audible regurgitation at bedside combined with patient complaints of food stopping mid-esophagus and then backing up and causing cough. Patient will benefit from skilled intervention to address the above impairments. Patients rehabilitation potential is considered to be Guarded PLAN : 
 Recommendations and Planned Interventions: 
--recommend sips of clear liquids as tolerated. Stop with any s/s of aspiration. Recommend further workup per GI regarding suspected esophageal dysphagia. Will follow after further GI workup is complete to determine need for any further evaluation. Frequency/Duration: Patient will be followed by speech-language pathology 2 times a week to address goals. Discharge Recommendations: To Be Determined SUBJECTIVE:  
Patient stated it stops right here and then starts backing up. Pointing mid chest 
 
OBJECTIVE:  
 
Past Medical History:  
Diagnosis Date CAD (coronary artery disease) Gastrointestinal disorder History reviewed. No pertinent surgical history. Prior Level of Function/Home Situation:  
Home Situation Home Environment: Private residence # Steps to Enter: 5 Rails to Enter: Yes One/Two Story Residence: Two story, live on 1st floor Living Alone: No 
Support Systems: Family member(s), Home care staff Patient Expects to be Discharged to[de-identified] Private residence Current DME Used/Available at Home: [address] Tub or Shower Type: Shower Diet prior to admission: regular Current Diet:  regular Cognitive and Communication Status: 
Neurologic State: Alert Orientation Level: Oriented to person Cognition: Follows commands Perception: Cues to maintain midline in sitting Perseveration: No perseveration noted Safety/Judgement: Decreased awareness of environment Oral Assessment: P.O. Trials: 
Patient Position: upright in bed Vocal quality prior to P.O.: No impairment Consistency Presented: Thin liquid How Presented: Self-fed/presented;Straw Bolus Acceptance: No impairment Bolus Formation/Control: No impairment Propulsion: No impairment Oral Residue: None Initiation of Swallow: Delayed (# of seconds)(suspect mild - functional for age ) Laryngeal Elevation: Functional(appears functional for age ) Aspiration Signs/Symptoms: Strong cough(after audible regurgitation ) Pharyngeal Phase Characteristics: Other (comment)(suspected esophageal dysphagia ) Effective Modifications: Small sips and bites Comments: patient reports feeling of food getting stuck in her esophagus and not moving and then coming back up Oral Phase Severity: No impairment Pharyngeal Phase Severity : No impairment(appears appropriate for age) NOMS:  
The NOMS functional outcome measure was used to quantify this patient's level of swallowing impairment. Based on the NOMS, the patient was determined to be at level 3 for swallow function NOMS Swallowing Levels: 
Level 1 (CN): NPO Level 2 (CM): NPO but takes consistency in therapy Level 3 (CL): Takes less than 50% of nutrition p.o. and continues with nonoral feedings; and/or safe with mod cues; and/or max diet restriction Level 4 (CK): Safe swallow but needs mod cues; and/or mod diet restriction; and/or still requires some nonoral feeding/supplements Level 5 (CJ): Safe swallow with min diet restriction; and/or needs min cues Level 6 (CI): Independent with p.o.; rare cues; usually self cues; may need to avoid some foods or needs extra time Level 7 (CH): Independent for all p.o. DAVID. (2003). National Outcomes Measurement System (NOMS): Adult Speech-Language Pathology User's Guide. Pain: 
Pain Scale 1: Numeric (0 - 10) Pain Intensity 1: 0 After treatment:  
Patient left in no apparent distress in bed, Call bell within reach, Nursing notified, and Caregiver / family present COMMUNICATION/EDUCATION:  
 
The patient's plan of care including recommendations, planned interventions, and recommended diet changes were discussed with: Registered nurse, GI PA Patient/family have participated as able in goal setting and plan of care. Patient/family agree to work toward stated goals and plan of care. Thank you for this referral. 
Brooklyn Shaffer M.CD.  CCC-SLP  
 Time Calculation: 12 mins

## 2020-10-05 NOTE — PROGRESS NOTES
Occupational Therapy: Patient currently leaving the floor for testing. Will follow and see as able and appropriate.  
MAGDALENE Keith/VISHAL

## 2020-10-05 NOTE — CONSULTS
Comprehensive Nutrition Assessment Reason for Visit: Kali Olivares Nutrition Recommendations/Plan: 1. Palliative consult for goals of care. Son inquiring about \"next steps\" in regards to pt's malnutrition. Given advanced age and pt previously expressing desire of not wanting to be in hospital, overall goals would be helpful in determining next steps. 2. Continue PO diet as tolerate with ONS for ease/concentration of nutrition. Discussed ways to maximize caloric intake. 3. May be worth checking B vitamin panel, most notably B12 to check for any deficiencies that could be neurological symptoms. Malnutrition Assessment: 
Malnutrition Status:  Severe malnutrition Context:  Acute illness Findings of the 6 clinical characteristics of malnutrition:  
Energy Intake:  7 - 75% or less est energy requirements for 1 month or longer Weight Loss:  Unable to assess Body Fat Loss:  7 - Severe body fat loss, Orbital  
Muscle Mass Loss:  7 - Severe muscle mass loss, Hand (interosseous), Temples (temporalis) Fluid Accumulation:  No significant fluid accumulation,   
 Strength:  Not performed Nutrition Assessment:    
80 y.o. female who has a past medical history of CAD (coronary artery disease) and Gastrointestinal disorder. . Admitted with TIA (transient ischemic attack) [G45.9] UTI (urinary tract infection) [N39.0] Pt seen today for follow-up. Noted 2nd new consult since initially assessed by this service last week on 9/29. At that time, pt told RD \"I've been at [facility] for a year and I'm not getting any better. I just want to go home. I'm 79 yo and I don't want to be in the hospital, I just want to be home with my family. \" Today I spoke with pt and son at bedside. Son requesting NFPE to help determine \"next steps\" in regards to patient's malnutrition.   Gladly performed with son present - pointing out areas of severe muscle wasting and loss of SQ fat, but explained that it is already well documented that patient meets severe malnutrition criteria. Son states that 1 month ago, pt was living on her own and to his knowledge eating well. However, given degree of muscle wasting/ loss of SQ fat, I suspect malnutrition is more chronic. Pt is of advanced age and would also suspect some degree of sarcopenia present. I explained trial of an appetite stimulant would be appropriate, but son states this was started yesterday. Upon further chart review, Remeron 15 mg was given last night, but discontinued. Appears new order for 7.5 mg/day to start tonight. At this point, if pt is still not eating, goals of care should be determined (as recommended by RD last week) - which I mentioned to son and he seemed open to this. While temporary or long-term nutrition support may be indicated in someone with severe malnutrition - given his mother's advance age, I would most certainly not recommend long-term nutrition support. Palliative care may help him make decisions about short-term goals and whether NG would be appropriate. Additionally, I explained to son that appetite stimulants will usually take several weeks to take full affect, so may not see changes right away and so this is also something he needs to consider. At this time, pt is taking minimal bites of each meal (if any). Son states mother is drinking the [employer], but takes her all day to drink 1 (220 kcal, 9 gm pro). Not taking other supplements that he is aware of. Also of note, GI was consulted over weekend d/t pt's complaint of swallowing difficulty. UGI was performed today which showed aspiration, but other details not yet available. Pt's attending ordered mechanical soft diet (previously on regular). Son ordered chicken noodle soup for lunch which was just delivered. Awaiting repeat SLP eval. 
Pt also started on Magic Mouthwash this weekend d/t concerns for thrush. Slurred speech thought to be d/t dry mouth from malnutrition. Attempted to have pt participate in conversation, but minimal interaction. She was able to tell me her UBW was 125 lb. Admitted at 122 lb, but has not been re-weighed. Also, initial wt was a bed weight and may be inaccurate. Regardless of wt loss, given poor PO intake and NFPE, pt meets criteria for both acute and chronic severe, protein-calorie malnutrition. Wt Readings from Last 20 Encounters:  
09/27/20 55.7 kg (122 lb 12.7 oz) 09/26/20 55.7 kg (122 lb 12.7 oz) 09/24/20 56.7 kg (125 lb)  
09/23/20 56.7 kg (125 lb) Nutritionally Significant Medications:  
Colace, LR @ 75 mL/hr, Layne Q, Nystatin \"swish and swallow\", Zosyn, Miralax, MVI, Remeron (started 10/4) Estimated Daily Nutrient Needs: 
Energy (kcal):  1207-1966(25-30 kcal/kg) Protein (g):  70(1.2 gm/kg) Fluid (ml/day):  1500(older adult) Nutrition Related Findings:   
Edema: none Last BM: 10/5 - soft Wounds:   
None Current Nutrition Therapies: DIET NUTRITIONAL SUPPLEMENTS All Meals; Ensure Compact (4 oz) DIET ONE TIME MESSAGE 
DIET NUTRITIONAL SUPPLEMENTS All Meals; Magic Cups DIET MECHANICAL SOFT 
DIET ONE TIME MESSAGE Meal intake:  
Patient Vitals for the past 168 hrs: 
 % Diet Eaten 10/04/20 1805 5 % 10/04/20 1230 0 % 10/04/20 0812 0 % 10/03/20 1854 0 % 10/03/20 1251 0 % 10/03/20 0844 0 % 10/02/20 1757 0 % 10/02/20 1311 0 % 10/02/20 0910 5 % Anthropometric Measures: 
· Height:  5' 2\" (157.5 cm) · Current Body Wt:  55.7 kg (122 lb 12.7 oz) · Admission Body Wt:  122 lb 12.7 oz(55.7 kg) · Usual Body Wt:  125 lb · Ideal Body Wt:    lb:  111.6 % · BMI Categories:  Underweight (BMI less than 22) age over 72   - suspect actual wt to be lower than current listed wt Nutrition Diagnosis: · Severe malnutrition related to inadequate protein-energy intake(advanced age; swallowing difficulty) as evidenced by severe loss of subcutaneous fat, severe muscle loss, intake 0-25% Nutrition Interventions:  
Food and/or Nutrient Delivery: Continue current diet, Continue oral nutrition supplement Nutrition Education and Counseling: Counseling initiated(high kcal/ high pro tips) Coordination of Nutrition Care: Continued inpatient monitoring Goals: 
PO improvements to >/=25% of meals with 1-2 ONS daily within 3-4 days; decisions re: goals of care Nutrition Monitoring and Evaluation:  
Behavioral-Environmental Outcomes: (-) Food/Nutrient Intake Outcomes: Food and nutrient intake, Supplement intake Physical Signs/Symptoms Outcomes: Biochemical data, Chewing or swallowing, GI status, Meal time behavior, Nutrition focused physical findings, Skin, Weight Discharge Planning:   
Continue oral nutrition supplement Recent Labs 10/04/20 
0124 10/03/20 
1610 * 132* BUN 10 12 CREA 0.79 0.90  137  
K 3.1* 3.0*  
 104 CO2 25 26  
CA 8.3* 8.2* No results for input(s): ALT, AP, TBIL, TBILI, TP, ALB, GLOB, GGT, AML, LPSE in the last 72 hours. No lab exists for component: SGOT, GPT, AMYP, HLPSE No results for input(s): LAC in the last 72 hours. Recent Labs 10/04/20 
0124 10/03/20 
1610 WBC 8.8 9.1 HGB 13.1 13.8 HCT 39.2 40.5  202 No results for input(s): PREALB in the last 72 hours. No results for input(s): TRIGL in the last 72 hours. Recent Labs 10/04/20 
1104 10/03/20 
1252 GLUCPOC 166* 155* Lab Results Component Value Date/Time Hemoglobin A1c 5.6 09/27/2020 05:22 PM  
 
 
Iron Profile No results found for: IRON, TIBC, PSAT No results found for: FERR B Vitamins No results found for: FOL, B12LT, VB6LT, VIB1LT Zinc 
No results found for: [facility] [employer] No results found for: COSLT Betzy Moreau RD Available via [email or web address] Or Pager# [phone]

## 2020-10-05 NOTE — PROGRESS NOTES
Problem: Mobility Impaired (Adult and Pediatric) Goal: *Acute Goals and Plan of Care (Insert Text) Description: FUNCTIONAL STATUS PRIOR TO ADMISSION: Patient was independent and active without the use of DME prior to her shoulder injury (R humeral fx w/ ORIF) about a month ago. Since that time, the patient has relied on a manual wheelchair pushed by caregivers for ambulation. She is able to walk short distances with considerable assistance from caregivers. She was not able to sit unsupported. HOME SUPPORT PRIOR TO ADMISSION: The patient lived alone with no support required prior to her shoulder injury. She discharged from [facility] to her son and daughter in [address] [place] [address]. Caregivers assist with adls and mobility. Patient is open to home health for PT/ OT. Physical Therapy Goals Initiated 9/28/2020 1. Patient will move from supine to sit and sit to supine , scoot up and down, and roll side to side in bed with moderate assistance  within 7 day(s). 2.  Patient will transfer from bed to chair and chair to bed with moderate assistance  using the least restrictive device within 7 day(s). 3.  Patient will perform sit to stand with moderate assistance  within 7 day(s). 4.  Patient will ambulate with moderate assistance  for 25 feet with the least restrictive device within 7 day(s). Outcome: Not Progressing Towards Goal 
 PHYSICAL THERAPY TREATMENT: WEEKLY REASSESSMENT Patient: P[place] Cortés (21 y.o. female) Date: 10/5/2020 Primary Diagnosis: TIA (transient ischemic attack) [G45.9] UTI (urinary tract infection) [N39.0] Precautions:   Fall(RUE sling s/p ORIF approx 4 wks ago) ASSESSMENT Patient continues with skilled PT services and is not progressing towards goals. Patient with increased lethargy today and per son, speech is more slurred. She is requiring max to total assist with all bed mobility on this date with little assist from patient.   Patient unable to sit unsupported on EOB today, falling backwards. Transport arrived to take to Barium Swallow test, session ended. Did talk with son that was present (not one that is wanting patient to come to his home) and explained that patient has had a decline since last seen and would be difficult to manage at home at present state of mobility. .  
 
Patient's progression toward goals since last assessment: declined; goals maintained Current Level of Function Impacting Discharge (mobility/balance): max assist; poor balance Functional Outcome Measure: The patient scored 0/56 on the Philippe outcome measure which is indicative of high fall risk. Other factors to consider for discharge: PLAN : 
Goals have been updated based on progression since last assessment. Patient continues to benefit from skilled intervention to address the above impairments. Recommendations and Planned Interventions: bed mobility training, transfer training, therapeutic exercises, patient and family training/education, and therapeutic activities Frequency/Duration: Patient will be followed by physical therapy:  5 times a week to address goals. Recommendation for discharge: (in order for the patient to meet his/her long term goals) Therapy up to 5 days/week in SNF setting This discharge recommendation: 
Has been made in collaboration with the attending provider and/or case management IF patient discharges home will need the following DME: none SUBJECTIVE:  
Patient stated Let me down! Grace Bowen OBJECTIVE DATA SUMMARY:  
HISTORY:   
Past Medical History:  
Diagnosis Date CAD (coronary artery disease) Gastrointestinal disorder History reviewed. No pertinent surgical history. Personal factors and/or comorbidities impacting plan of care:  
 
Home Situation Home Environment: Private residence # Steps to Enter: 5 Rails to Enter: Yes One/Two Story Residence: Two story, live on 1st floor Living Alone: No 
 Support Systems: Family member(s), Home care staff Patient Expects to be Discharged to[de-identified] Private residence Current DME Used/Available at Home: 6484 dVisit) Tub or Shower Type: Shower EXAMINATION/PRESENTATION/DECISION MAKING:  
Critical Behavior: 
Neurologic State: Alert Orientation Level: Oriented to person, Oriented to situation, Disoriented to time, Disoriented to place Cognition: Follows commands Safety/Judgement: Decreased awareness of environment Hearing: Auditory Auditory Impairment: None Functional Mobility: 
Bed Mobility: 
Rolling: Maximum assistance Supine to Sit: Maximum assistance Sit to Supine: Maximum assistance;Assist x2 Scooting: Total assistance Transfers: 
  
  
     
  
     
  
  
Balance:  
Sitting: Impaired Sitting - Static: Poor (constant support) Sitting - Dynamic: Poor (constant support) Functional Measure: 
[place] Balance Test: 
 
Sitting to Standin Standing Unsupported: 0 Sitting with Back Unsupported: 0 Standing to Sittin Transfers: 0 Standing Unsupported with Eyes Closed: 0 Standing Unsupported with Feet Together: 0 Reach Forward with Outstretched Arm: 0  Object: 0 Turn to Look Over Shoulders: 0 Turn 360 Degrees: 0 Alternate Foot on Step/Stool: 0 Standing Unsupported One Foot in Front: 0 Stand on One Le Total: 0/56 
  
 
 
56=Maximum possible score;  
0-20=High fall risk 21-40=Moderate fall risk 41-56=Low fall risk After treatment patient left in no apparent distress:  
Supine in bed, Caregiver / family present, and preparing for transport COMMUNICATION/EDUCATION:  
The patients plan of care was discussed with: Registered nurse and Case management. Patient is unable to participate in goal setting and plan of care. Thank you for this referral. 
Marcia Land, PT Time Calculation: 15 mins

## 2020-10-05 NOTE — PROGRESS NOTES
[facility] [employer] 
[address] [facility], [address] GI PROGRESS NOTE Will Kaylee Jung, [address] [facility] office [phone] NP/PA in-hospital cell phone M-F until 4:30PM 
After 5PM or on weekends, please call  for physician on call NAME: Alfred Carroll :  5/15/1927 MRN:  [id number] Subjective:  
Patient reports nausea. No vomiting or abdominal pain. Son is at the bedside and reports history of dysphagia. She is NPO. Objective: VITALS:  
Last 24hrs VS reviewed since prior progress note. Most recent are: 
Visit Vitals /79 Pulse (!) 102 Temp 98 °F (36.7 °C) Resp 19 Ht 5' 2\" (1.575 m) Wt 55.7 kg (122 lb 12.7 oz) SpO2 94% Breastfeeding No  
BMI 22.46 kg/m² PHYSICAL EXAM: 
General: Cooperative, no acute distress Neurologic:  Alert HEENT: EOMI, no scleral icterus Lungs:  No respiratory distress Heart:  S1 S2 Abdomen: Soft, non-distended, no apparent tenderness, no guarding, no rebound. +Bowel sounds. Extremities: Warm Psych:   Not anxious or agitated Lab Data Reviewed:  
 
Recent Results (from the past 24 hour(s)) GLUCOSE, POC Collection Time: 10/04/20 11:04 AM  
Result Value Ref Range Glucose (POC) 166 (H) 65 - 100 mg/dL Performed by [facility] Assessment: · Dysphagia · Nausea Patient Active Problem List  
Diagnosis Code  TIA (transient ischemic attack) G45.9  
 UTI (urinary tract infection) N39.0 Plan:  
· NPO 
· SLP provided diet recommendations · Follow UGI series results. Based on results, next consider EGD versus gastric emptying study. Signed By: Brandyn Costa   
 10/5/2020  9:27 AM 
  
 
 
This patient was seen and examined by me in a face-to-face visit today. I reviewed the medical record including lab work, imaging and other provider notes. I confirmed the interval history as described above.  I spoke to the patient, however the patient is unable to give a meaningful history. I discussed this case in detail with Brandyn Austin. I formulated an updated  assessment of this patient and guided our treatment plan. I agree with the above progress note. I agree with the history, exam and assessment and plan as outlined in the note. I would like to add the following: UGI series reviewed. This was a study that limited by patient's aspiration and study was otherwise grossly normal per radiologist's review. We will plan for endoscopy next if patient's son agrees. Davi Snow MD

## 2020-10-05 NOTE — PROGRESS NOTES
Reviewed chart and note pt NPO for UGI series. Will follow for re-evaluation pending results. Note evaluation earlier this admission with overall functional oropharyngeal swallow. Herson Salcedo M.CD.  CCC-SLP

## 2020-10-06 NOTE — PROGRESS NOTES
Attempted to visit pt for spiritual assessment. Unable to complete assessment at this time, pt sleeping and did not awake.  present at the Palliative Medicine IDT where the pt's case was discussed. Additionally pt's chart was consulted. Chaplains will continue to offer support as needed. Augusta Mohs, Chaplain, MDiv, MS, [facility] 
[phone])

## 2020-10-06 NOTE — PROGRESS NOTES
[facility] 
[address] [facility], [address] GI PROGRESS NOTE Bipin Morales, [address] office [phone] NP in-hospital cell phone M-F until 4:30 After 5pm or on weekends, please call  for physician on call NAME: Skinny Mealing :  5/15/1927 MRN:  [id number] Subjective: No acute events. Son at bedside. Objective: VITALS:  
Last 24hrs VS reviewed since prior progress note. Most recent are: 
Visit Vitals /80 Pulse (!) 101 Temp 98.4 °F (36.9 °C) Resp 19 Ht 5' 2\" (1.575 m) Wt 59 kg (130 lb) SpO2 92% Breastfeeding No  
BMI 23.78 kg/m² PHYSICAL EXAM: 
General: No acute distress   
Neurologic:  drowsy HEENT: EOMI, no scleral icterus Lungs:  CTA bilaterally. No wheezing Heart:  S1 S2 Abdomen: Soft, non-distended, no apparent tenderness. +Bowel sounds Extremities: warm Psych:   [facility] Lab Data Reviewed:  
 
Recent Results (from the past 24 hour(s)) CBC WITH AUTOMATED DIFF Collection Time: 10/06/20  1:00 AM  
Result Value Ref Range WBC 10.0 3.6 - 11.0 K/uL  
 RBC 4.15 3.80 - 5.20 M/uL  
 HGB 13.8 11.5 - 16.0 g/dL HCT 41.5 35.0 - 47.0 % .0 (H) 80.0 - 99.0 FL  
 MCH 33.3 26.0 - 34.0 PG  
 MCHC 33.3 30.0 - 36.5 g/dL  
 RDW 18.0 (H) 11.5 - 14.5 % PLATELET 070 241 - 488 K/uL MPV 10.3 8.9 - 12.9 FL  
 NRBC 0.0 0  WBC ABSOLUTE NRBC 0.00 0.00 - 0.01 K/uL NEUTROPHILS 74 32 - 75 % LYMPHOCYTES 10 (L) 12 - 49 % MONOCYTES 9 5 - 13 % EOSINOPHILS 5 0 - 7 % BASOPHILS 1 0 - 1 % IMMATURE GRANULOCYTES 1 (H) 0.0 - 0.5 % ABS. NEUTROPHILS 7.5 1.8 - 8.0 K/UL  
 ABS. LYMPHOCYTES 1.0 0.8 - 3.5 K/UL  
 ABS. MONOCYTES 0.9 0.0 - 1.0 K/UL  
 ABS. EOSINOPHILS 0.5 (H) 0.0 - 0.4 K/UL  
 ABS. BASOPHILS 0.1 0.0 - 0.1 K/UL  
 ABS. IMM. GRANS. 0.1 (H) 0.00 - 0.04 K/UL  
 DF AUTOMATED METABOLIC PANEL, BASIC Collection Time: 10/06/20  1:00 AM  
Result Value Ref Range Sodium 137 136 - 145 mmol/L Potassium 2.9 (L) 3.5 - 5.1 mmol/L Chloride 104 97 - 108 mmol/L  
 CO2 26 21 - 32 mmol/L Anion gap 7 5 - 15 mmol/L Glucose 95 65 - 100 mg/dL BUN 10 6 - 20 MG/DL Creatinine 0.83 0.55 - 1.02 MG/DL  
 BUN/Creatinine ratio 12 12 - 20 GFR est AA >60 >60 ml/min/1.73m2 GFR est non-AA >60 >60 ml/min/1.73m2 Calcium 8.0 (L) 8.5 - 10.1 MG/DL MAGNESIUM Collection Time: 10/06/20  1:00 AM  
Result Value Ref Range Magnesium 1.8 1.6 - 2.4 mg/dL Assessment: · Dysphagia: UGI limited by aspiration · Nausea Patient Active Problem List  
Diagnosis Code  TIA (transient ischemic attack) G45.9  
 UTI (urinary tract infection) N39.0 Plan: · Diet per SLP - sips of clear liquids · Hold Eliquis - discussed with Dr. Bard Salazar · Plan for EGD Thursday - discussed with son who is agreeable Signed By: Ashley Hines NP   
 10/6/2020  9:22 AM 
  
 
GI Attending: Agree with above plan. Will plan for EGD on Thursday. Diet per SLP. Davi Mancilla MD

## 2020-10-06 NOTE — PROGRESS NOTES
Problem: Falls - Risk of 
Goal: *Absence of Falls Description: Document Albino Messing Fall Risk and appropriate interventions in the flowsheet. Outcome: Progressing Towards Goal 
Note: Fall Risk Interventions: 
Mobility Interventions: Communicate number of staff needed for ambulation/transfer, OT consult for ADLs, PT Consult for mobility concerns, Patient to call before getting OOB, Utilize gait belt for transfers/ambulation, Utilize walker, cane, or other assistive device Mentation Interventions: Adequate sleep, hydration, pain control, Bed/chair exit alarm, Family/sitter at bedside, Door open when patient unattended, Gait belt with transfers/ambulation, Increase mobility, More frequent rounding, Reorient patient, Toileting rounds, Update white board Medication Interventions: Evaluate medications/consider consulting pharmacy, Patient to call before getting OOB Elimination Interventions: Bed/chair exit alarm, Call light in reach History of Falls Interventions: Bed/chair exit alarm, Consult care management for discharge planning, Door open when patient unattended Problem: Patient Education: Go to Patient Education Activity Goal: Patient/Family Education Outcome: Progressing Towards Goal 
  
Problem: Pressure Injury - Risk of 
Goal: *Prevention of pressure injury Description: Document Josue Scale and appropriate interventions in the flowsheet. Outcome: Progressing Towards Goal 
Note: Pressure Injury Interventions: 
Sensory Interventions: Assess changes in LOC, Check visual cues for pain, Float heels, Keep linens dry and wrinkle-free, Minimize linen layers, Pad between skin to skin, Turn and reposition approx. every two hours (pillows and wedges if needed) Moisture Interventions: Absorbent underpads, Apply protective barrier, creams and emollients, Assess need for specialty bed, Contain wound drainage, Limit adult briefs, Maintain skin hydration (lotion/cream), Minimize layers, Offer toileting Q_hr Activity Interventions: Increase time out of bed Mobility Interventions: Pressure redistribution bed/mattress (bed type), Float heels, HOB 30 degrees or less, Turn and reposition approx. every two hours(pillow and wedges) Nutrition Interventions: Document food/fluid/supplement intake Friction and Shear Interventions: Apply protective barrier, creams and emollients, HOB 30 degrees or less, Minimize layers Problem: Patient Education: Go to Patient Education Activity Goal: Patient/Family Education Outcome: Progressing Towards Goal 
  
Problem: Patient Education: Go to Patient Education Activity Goal: Patient/Family Education Outcome: Progressing Towards Goal 
  
Problem: Patient Education: Go to Patient Education Activity Goal: Patient/Family Education Outcome: Progressing Towards Goal 
  
Problem: Urinary Tract Infection - Adult Goal: *Absence of infection signs and symptoms Outcome: Progressing Towards Goal 
  
Problem: Patient Education: Go to Patient Education Activity Goal: Patient/Family Education Outcome: Progressing Towards Goal 
  
Problem: Nutrition Deficit Goal: *Optimize nutritional status Outcome: Progressing Towards Goal 
  
Problem: Patient Education: Go to Patient Education Activity Goal: Patient/Family Education Outcome: Progressing Towards Goal

## 2020-10-06 NOTE — PROGRESS NOTES
[address] [facility] Adult  Hospitalist Group Hospitalist Progress Note Shilpi Holder MD 
Answering service: [phone] OR [phone] from in house phone Date of Service:  10/6/2020 NAME:  Otto Car :  5/15/1927 MRN:  [id number] Admission Summary:  
 
Otto Car is a 80 y.o. female who presents with slurred speech and weakness. Patient was seen in ER one day ago for stroke symptoms and worked up including had an MRI which was negative for acute stroke. She was discharged home and she presents back with persistent slurred speech and weakness. There was no report of facial asymmetry or droop, focal hand or leg weakness. There is more like generalized weakness. While in the ER blood sugar per EMS was 177. Vital signs in ED showed temperature 98.3 pulse rate of 100 respiratory 23 pressure 114/71 O2 sats 97% on room air When she was in the ED, EKG showed A. fib rate controlled She had a CT scan code neuro which was negative acute stroke or large vessel occlusion She was deemed not a tpa candidate per tele neuro. An MRI of the brain has been ordered and was pending The patient was in the room with her son. She feels like nothing is wrong with her. She had a recent right shoulder fracture for which she had been to rehab. She reports that the right shoulder still hurtful and also she is having nausea associated with this. Interval history / Subjective:  
 
Pt seen in room denies fevers or chills. Patient was seen in room today. Her son was in the room with her. She appears to be sleepy but arousable to verbal responses. She denies slurring of the speech. She denies dyspnea. Assessment & Plan:  
 
Slurred speech and generalized weakness possible due to TIA vs UTI 
-has slurred speech, possible due to dry tongue from dehydratin -continue on Eliquis, aspirin and pravachol 
-CT head no acute change - 9/26 and 9/27 
-CTA head/CT perfusion no acute large vessel occlusion  - 9/26 and 9/27 
-MRI chronic white matter disease with no acute infarction. - 9/26 and 9/27 
-lipid panel is normal 
-troponin <0.05 
-continue neuro checks 
-consult to speech therapist  
-Seen by Neurology - thinks from UTI and not realy a true seizure or CVA. -Continue to monitor 
- 10/6- somnolence - check blood Sugars - 84, ABG ok. Now more awake. Hold Remeron. Thyroid cyst and abnormal thyroid function 
-2.6 cm cystic nodule right thyroid lobe on CTA head, not sure clinical significance -TSH elevated, free T4 normal, patient on amiodarone  
-repeat thyroid function in 4 weeks Dehydration due to poor oral intake and UTI 
-encourage po intake, normal saline at 100 ml/34 
-add multivitamin  
-monitor electrolytes  
-D/w Son - they are not interested in feeding tubes. UTI POA 
-urine cx showing pseudomonas - switch to zosyn - Recheck Culture per family request 10/4 Can use cipro however she has prolonged qtc and hence limited in options Finishing course on 10/7 Chronic A Fib, rate controlled 
-EKG atrial fibrillation vent rate 102 non specific st t wave 
-continue on amiodarone, metoprolol and eliquis  
-10/3, EKG checked which showed A. fib. Hyperglycemia on admission -A1c 5.6 
-no further intervention Hx of right humerus fracture s/p ORIF - poa 
-has right shoulder pain 
-continue prn tylenol COVID 19 test on 9/29 negative Code status: DNR 
DVT prophylaxis: Eliquis Care Plan discussed with: Patient/Family, Nurse and  Anticipated Disposition: SNF Anticipated Discharge: 24 hours to 48 hours   On IV abx 
 
10/1,10/2,3,4 D/w Son Vanessa  
 
-10/3 -had another episode of slurred speech. Check CT scan of the head now. Check UA resolution of UTI. Blood glucose okay.   Closely monitor with neuro checks. Will request neuro relook. Check Orthostatics  
-10/4 - Seen By neurology doesn't think stroke. Seen by GI , D/w Son he would like a Barium study - ordered. Also check Urine culture for resolution of UTI. C/w ABX. Son would like increased appetite - discussed about remeron in gareement after discussing side effect profile 10/5- Had [address] study today. GI following Request palliative care eval to discuss about goals of care. D/w Son at bedside 10/6-barium study showing aspiration. Speech evaluation now recommended to be on thin liquids. Aspiration precautions. His hearing to be lethargic today. Possibly secondary to Remeron started for appetite stimulation. This has been discontinued. POC blood glucose showed 84 ABG unremarkable. She is awakening and fully interactive at this time. Continue to monitor. GI planning for endoscopy on Thursday, after being off Eliquis for 2 days. Hospital Problems  Never Reviewed Codes Class Noted POA  
 UTI (urinary tract infection) ICD-10-CM: N39.0 ICD-9-CM: 599.0  9/29/2020 Unknown  
   
 TIA (transient ischemic attack) ICD-10-CM: G45.9 ICD-9-CM: 435.9  9/27/2020 Unknown Vital Signs:  
 Last 24hrs VS reviewed since prior progress note. Most recent are: 
Visit Vitals /80 Pulse (!) 101 Temp 98.4 °F (36.9 °C) Resp 19 Ht 5' 2\" (1.575 m) Wt 59 kg (130 lb) SpO2 92% Breastfeeding No  
BMI 23.78 kg/m² No intake or output data in the 24 hours ending 10/06/20 1247 Physical Examination:  
     
Gen:  No distress, alert HEENT:  Normal cephalic atraumatic, extra-occular movements are intact. Neck:  Supple, No JVD Lungs:  Clear bilaterally, no wheeze, no rales, normal effort Heart:  Regular Rate and Rhythm, normal S1 and S2, no edema Abdomen:  Soft, non tender, normal bowel sounds, no guarding. Extremities:  Well perfused, no cyanosis or edema Neurological:  Awake and alert, CN's are intact, normal strength throughout extremities, Slurred speech Skin:  No rashes or moles Mental Status:  Normal thought process, does not appear anxious Data Review:  
 Review and/or order of clinical lab test 
Review and/or order of tests in the radiology section of CPT Review and/or order of tests in the medicine section of CPT Labs:  
 
Recent Labs 10/06/20 
0100 10/04/20 
0124 WBC 10.0 8.8 HGB 13.8 13.1 HCT 41.5 39.2  193 Recent Labs 10/06/20 
0100 10/04/20 
0124 10/03/20 
1610  138 137  
K 2.9* 3.1* 3.0*  
 105 104 CO2 26 25 26 BUN 10 10 12 CREA 0.83 0.79 0.90 GLU 95 104* 132* CA 8.0* 8.3* 8.2* MG 1.8  --   -- No results for input(s): ALT, AP, TBIL, TBILI, TP, ALB, GLOB, GGT, AML, LPSE in the last 72 hours. No lab exists for component: SGOT, GPT, AMYP, HLPSE No results for input(s): INR, PTP, APTT, INREXT, INREXT in the last 72 hours. No results for input(s): FE, TIBC, PSAT, FERR in the last 72 hours. No results found for: FOL, RBCF No results for input(s): PH, PCO2, PO2 in the last 72 hours. No results for input(s): CPK, CKNDX, TROIQ in the last 72 hours. No lab exists for component: CPKMB Lab Results Component Value Date/Time Cholesterol, total 123 09/28/2020 04:42 AM  
 HDL Cholesterol 53 09/28/2020 04:42 AM  
 LDL, calculated 55.4 09/28/2020 04:42 AM  
 Triglyceride 73 09/28/2020 04:42 AM  
 CHOL/HDL Ratio 2.3 09/28/2020 04:42 AM  
 
Lab Results Component Value Date/Time Glucose (POC) 166 (H) 10/04/2020 11:04 AM  
 Glucose (POC) 155 (H) 10/03/2020 12:52 PM  
 
Lab Results Component Value Date/Time  Color YELLOW/STRAW 10/03/2020 04:18 PM  
 Appearance CLEAR 10/03/2020 04:18 PM  
 Specific gravity 1.013 10/03/2020 04:18 PM  
 Specific gravity <1.005 09/27/2020 11:10 PM  
 pH (UA) 6.5 10/03/2020 04:18 PM  
 Protein Negative 10/03/2020 04:18 PM  
 Glucose Negative 10/03/2020 04:18 PM  
 Ketone Negative 10/03/2020 04:18 PM  
 Bilirubin Negative 10/03/2020 04:18 PM  
 Urobilinogen 0.2 10/03/2020 04:18 PM  
 Nitrites Negative 10/03/2020 04:18 PM  
 Leukocyte Esterase LARGE (A) 10/03/2020 04:18 PM  
 Epithelial cells FEW 10/03/2020 04:18 PM  
 Bacteria Negative 10/03/2020 04:18 PM  
 WBC 20-50 10/03/2020 04:18 PM  
 RBC 0-5 10/03/2020 04:18 PM  
 
 
 
Medications Reviewed:  
 
Current Facility-Administered Medications Medication Dose Route Frequency  mirtazapine (REMERON) tablet 7.5 mg  7.5 mg Oral QHS  lactobac ac& pc-s.therm-b.anim (ISABELLE Q/RISAQUAD)  1 Cap Oral DAILY  ondansetron (ZOFRAN) injection 4 mg  4 mg IntraVENous Q6H PRN  
 lactated Ringers infusion  75 mL/hr IntraVENous CONTINUOUS  
 nystatin (MYCOSTATIN) 100,000 unit/mL oral suspension 500,000 Units  500,000 Units Swish and Swallow QID  piperacillin-tazobactam (ZOSYN) 3.375 g in 0.9% sodium chloride (MBP/ADV) 100 mL  3.375 g IntraVENous Q8H  
 therapeutic multivitamin (THERAGRAN) tablet 1 Tab  1 Tab Oral DAILY  polyethylene glycol (MIRALAX) packet 17 g  17 g Oral DAILY  docusate sodium (COLACE) capsule 100 mg  100 mg Oral BID PRN  
 albuterol (PROVENTIL VENTOLIN) nebulizer solution 2.5 mg  2.5 mg Nebulization TID PRN  
 amiodarone (CORDARONE) tablet 200 mg  200 mg Oral DAILY  [Held by provider] apixaban (ELIQUIS) tablet 2.5 mg  2.5 mg Oral BID  latanoprost (XALATAN) 0.005 % ophthalmic solution 1 Drop  1 Drop Both Eyes QHS  metoprolol tartrate (LOPRESSOR) tablet 50 mg  50 mg Oral QPM  
 pravastatin (PRAVACHOL) tablet 10 mg  10 mg Oral QHS  timolol (TIMOPTIC) 0.5 % ophthalmic solution 1 Drop  1 Drop Both Eyes BID  acetaminophen (TYLENOL) tablet 650 mg  650 mg Oral Q4H PRN  
 aspirin chewable tablet 81 mg  81 mg Oral DAILY  labetaloL (NORMODYNE;TRANDATE) injection 5 mg  5 mg IntraVENous Q10MIN PRN  
 
 ______________________________________________________________________ EXPECTED LENGTH OF STAY: 2d 0h 
ACTUAL LENGTH OF STAY:          7 
 
            
Peggy Correia MD

## 2020-10-06 NOTE — PROGRESS NOTES
Nutrition Note Brief check in with SLP and GI evals from yesterday. Spoke with son Evie Cheng at bedside. Noted plans for EGD on Thursday. Diet adjusted to clear liquids per SLP recommendations. I clarified with SLP Elizabeth Edwards, pt OK to continue receiving Ensure Compact/ liquids in general.  Clear liquids ordered as it best restricts diet to liquids only (full liquids would still allow items like strained oatmeal and pudding). Pt seems to have difficulty clearing purees/ solids from esophagus, so pt should be limited to liquids only at least until after EGD when etiology of suspected esophageal dysphagia can be addressed. Of note, son Evie Cheng states pt generally does not like anything cold, so would not resume Magic Cup or offer ice cream/sherbets. Will continue Ensure Compact with clear liquid diet. Informed son. Palliative consult was placed yesterday to address goals (thank you!) and is pending at this time. Please refer to RD consult note from yesterday for further details. Will continue to follow along closely/ follow-up with palliative meeting and EGD results. Fernanda Teixeira RD Available via [employer] Or Pager# [phone]

## 2020-10-06 NOTE — CONSULTS
Palliative Medicine Consult [place]: [phone]) Patient Name: Tony Elliott YOB: 1927 Date of Initial Consult: 10/6/2020 Reason for Consult: Care decisions Requesting Provider: Dr. Stephen Ken Primary Care Physician: Ijeoma Davis MD 
 
 SUMMARY:  
Tony Elliott is a 80 y.o. female with a past history of chronic atrial fib, CAD, and a recent right humerus fracture s/p ORIF, who was admitted on 9/27/2020 from home with a diagnosis of slurred speech concerning for TIA/CVA. She had multiple episodes of slurred speech at home and presented to the ED. She was admitted for further management. MRI of the brain showed chronic white matter disease with no acute infarction. Speech therapy was consulted and was concerned about the possibility of esophageal dysphagia with obstruction. GI was consulted and an EGD is planned for 10/8/20 to further evaluate. Current medical issues leading to Palliative Medicine involvement include: dysphagia, need to discuss care decisions. Social: She has three sons who are very involved in her care. PALLIATIVE DIAGNOSES:  
1. Goals of care 2. Failure to thrive 3. Feeding difficulties 4. Anorexia 5. Dysphagia 6. Impaired mobility PLAN:  
1. Prior to meeting patient, the medical record was reviewed. 2. Patient seen at the bedside with son Rick Moss present. Palliative medicine services introduced. 3. Lengthy discussion at the bedside about palliative medicine services vs hospice care. We also discussed her recent functional decline after the right humerus ORIF and the new concern about her decreased PO intake and possible esophageal mass/obstruction. Plan made to see what the results are from the endoscopy on Thursday. These results will help guide decision making. 4. I suggested that we have a family meeting on Friday with the patient and all three sons present.  Jose Moss will let me know if this is something the family wants to do. Contact information given to Libia. 5. Initial consult note routed to primary continuity provider and/or primary health care team members 6. Communicated plan of care with: Palliative IDTDennis Team 
 
 GOALS OF CARE / TREATMENT PREFERENCES:  
 
GOALS OF CARE: 
Patient/Health Care Proxy Stated Goals: Prolong life TREATMENT PREFERENCES:  
Code Status: DNR Advance Care Planning: 
[x] [facility] Interdisciplinary Team has updated the ACP Navigator with [place] and Patient Capacity Primary Decision Maker: Ge June - Son - [phone] Secondary Decision Maker: Lelejenifernikolas Delfina - Son - [phone] Advance Care Planning 9/28/2020 Patient's Healthcare Decision Maker is: Named in scanned ACP document Confirm Advance Directive Yes, on file Medical Interventions: Limited additional interventions Other Instructions:  
Artificially Administered Nutrition: (to be discussed further) Other: As far as possible, the palliative care team has discussed with patient / health care proxy about goals of care / treatment preferences for patient. HISTORY:  
 
History obtained from: chart, son CHIEF COMPLAINT:  
 
HPI/SUBJECTIVE: The patient is:  
[x] Verbal and participatory [] Non-participatory due to: She denies any pain, nausea, or shortness of breath at this time. Clinical Pain Assessment (nonverbal scale for severity on nonverbal patients):  
Clinical Pain Assessment Severity: 0 Duration: for how long has pt been experiencing pain (e.g., 2 days, 1 month, years) Frequency: how often pain is an issue (e.g., several times per day, once every few days, constant) FUNCTIONAL ASSESSMENT:  
 
Palliative Performance Scale (PPS): PPS: 50  PSYCHOSOCIAL/SPIRITUAL SCREENING:  
 
Palliative IDT has assessed this patient for cultural preferences / practices and a referral made as appropriate to needs (Cultural Services, Patient Advocacy, Ethics, etc.) Any spiritual / Mormonism concerns: 
[] Yes /  [x] No 
 
Caregiver Burnout: 
[] Yes /  [x] No /  [] No Caregiver Present Anticipatory grief assessment:  
[x] Normal  / [] Maladaptive ESAS Anxiety: ESAS Depression:    
 
 
 REVIEW OF SYSTEMS:  
 
Positive and pertinent negative findings in ROS are noted above in HPI. The following systems were [x] reviewed / [] unable to be reviewed as noted in HPI Other findings are noted below. Systems: constitutional, ears/nose/mouth/throat, respiratory, gastrointestinal, genitourinary, musculoskeletal, integumentary, neurologic, psychiatric, endocrine. Positive findings noted below. Modified ESAS Completed by: provider Fatigue: 8 Drowsiness: 5 Pain: 0 Anorexia: 10 Dyspnea: 0 Stool Occurrence(s): 1 PHYSICAL EXAM:  
 
From RN flowsheet: 
Wt Readings from Last 3 Encounters:  
10/06/20 130 lb (59 kg) 09/26/20 122 lb 12.7 oz (55.7 kg) 09/24/20 125 lb (56.7 kg) Blood pressure 123/79, pulse 100, temperature 97.5 °F (36.4 °C), resp. rate 18, height 5' 2\" (1.575 m), weight 130 lb (59 kg), SpO2 93 %, not currently breastfeeding. Pain Scale 1: Numeric (0 - 10) Pain Intensity 1: 0 Last bowel movement, if known:  
 
Constitutional: resting quietly in bed, elderly, no acute distress Eyes: pupils equal, anicteric ENMT: no nasal discharge, moist mucous membranes Respiratory: breathing not labored, symmetric Gastrointestinal: soft non-tender Musculoskeletal: no deformity, no tenderness to palpation Skin: warm, dry Neurologic: following commands, moving all extremities Psychiatric: full affect, no hallucinations HISTORY:  
 
Active Problems: 
  TIA (transient ischemic attack) (9/27/2020) UTI (urinary tract infection) (9/29/2020) Past Medical History:  
Diagnosis Date  CAD (coronary artery disease)  Gastrointestinal disorder History reviewed. No pertinent surgical history. History reviewed. No pertinent family history. History reviewed, no pertinent family history. Social History Tobacco Use  Smoking status: Never Smoker  Smokeless tobacco: Never Used Substance Use Topics  Alcohol use: Not Currently Allergies Allergen Reactions  Ambien [Zolpidem] Unknown (comments)  Fosamax [Alendronate] Unknown (comments) Current Facility-Administered Medications Medication Dose Route Frequency  [Held by provider] mirtazapine (REMERON) tablet 7.5 mg  7.5 mg Oral QHS  lactobac ac& pc-s.therm-b.anim (ISABELLE Q/RISAQUAD)  1 Cap Oral DAILY  ondansetron (ZOFRAN) injection 4 mg  4 mg IntraVENous Q6H PRN  
 lactated Ringers infusion  75 mL/hr IntraVENous CONTINUOUS  
 nystatin (MYCOSTATIN) 100,000 unit/mL oral suspension 500,000 Units  500,000 Units Swish and Swallow QID  therapeutic multivitamin (THERAGRAN) tablet 1 Tab  1 Tab Oral DAILY  polyethylene glycol (MIRALAX) packet 17 g  17 g Oral DAILY  docusate sodium (COLACE) capsule 100 mg  100 mg Oral BID PRN  
 albuterol (PROVENTIL VENTOLIN) nebulizer solution 2.5 mg  2.5 mg Nebulization TID PRN  
 amiodarone (CORDARONE) tablet 200 mg  200 mg Oral DAILY  [Held by provider] apixaban (ELIQUIS) tablet 2.5 mg  2.5 mg Oral BID  latanoprost (XALATAN) 0.005 % ophthalmic solution 1 Drop  1 Drop Both Eyes QHS  metoprolol tartrate (LOPRESSOR) tablet 50 mg  50 mg Oral QPM  
 pravastatin (PRAVACHOL) tablet 10 mg  10 mg Oral QHS  timolol (TIMOPTIC) 0.5 % ophthalmic solution 1 Drop  1 Drop Both Eyes BID  acetaminophen (TYLENOL) tablet 650 mg  650 mg Oral Q4H PRN  
 aspirin chewable tablet 81 mg  81 mg Oral DAILY  labetaloL (NORMODYNE;TRANDATE) injection 5 mg  5 mg IntraVENous Q10MIN PRN  
 
 
 
 LAB AND IMAGING FINDINGS:  
 
Lab Results Component Value Date/Time WBC 10.0 10/06/2020 01:00 AM  
 HGB 13.8 10/06/2020 01:00 AM  
 PLATELET 825 02/20/3832 01:00 AM  
 
Lab Results Component Value Date/Time Sodium 137 10/06/2020 01:00 AM  
 Potassium 2.9 (L) 10/06/2020 01:00 AM  
 Chloride 104 10/06/2020 01:00 AM  
 CO2 26 10/06/2020 01:00 AM  
 BUN 10 10/06/2020 01:00 AM  
 Creatinine 0.83 10/06/2020 01:00 AM  
 Calcium 8.0 (L) 10/06/2020 01:00 AM  
 Magnesium 1.8 10/06/2020 01:00 AM  
  
Lab Results Component Value Date/Time Alk. phosphatase 88 10/01/2020 01:13 AM  
 Protein, total 5.8 (L) 10/01/2020 01:13 AM  
 Albumin 2.4 (L) 10/01/2020 01:13 AM  
 Globulin 3.4 10/01/2020 01:13 AM  
 
Lab Results Component Value Date/Time INR 1.1 09/27/2020 05:22 PM  
 Prothrombin time 11.5 (H) 09/27/2020 05:22 PM  
  
No results found for: IRON, FE, TIBC, IBCT, PSAT, FERR No results found for: PH, PCO2, PO2 No components found for: [place] No results found for: CPK, CKMB Total time: 70 min Counseling / coordination time, spent as noted above: 65 min 
> 50% counseling / coordination?: yes Prolonged service was provided for  []30 min   []75 min in face to face time in the presence of the patient, spent as noted above. Time Start:  
Time End:  
Note: this can only be billed with 13494 (initial) or 78126 (follow up). If multiple start / stop times, list each separately.

## 2020-10-06 NOTE — CONSULTS
Palliative Medicine Consult Sr: [phone]) Consult received and chart reviewed. Patient seen at bedside with son Jaycee Denton present. Palliative medicine services introduced. Lengthy discussion at the bedside about palliative medicine services vs hospice care. We also discussed her recent functional decline after the right humerus ORIF and the new concern about her decreased PO intake and possible esophageal mass. Plan made to see what the results are from the endoscopy on Thursday. These results will help guide decision making. I suggested that we have a family meeting on Friday with the patient and all three sons present. Son Jaycee Denton will let me know if this is something the family wants to do. Full consult note to follow. Aaron Berg, EDC

## 2020-10-06 NOTE — PROGRESS NOTES
Physical Therapy 
attempted to work with patient but she is stating she just wants to sleep. Son sitting at bedside and indicates that she has been able to eat and awaiting scope of esophagus on Thursday. Time spent talking with son about plans and his desire to take her home. Updated him on her mobility status from yesterday and not safe for transfer to chair due to such poor balance which is worse than last week. We discussed equipment needs if she does go home with possible need for carter lift. Also would want home health PT/OT. Will continue to follow and attempt to work with patient when appropriate.  
Sumaya Mcmahan, PT

## 2020-10-06 NOTE — PROGRESS NOTES
Problem: Falls - Risk of 
Goal: *Absence of Falls Description: Document Phoebe Mcmullen Fall Risk and appropriate interventions in the flowsheet. Outcome: Progressing Towards Goal 
Note: Fall Risk Interventions: 
Mobility Interventions: Communicate number of staff needed for ambulation/transfer, Bed/chair exit alarm Mentation Interventions: Adequate sleep, hydration, pain control, Bed/chair exit alarm, Door open when patient unattended Medication Interventions: Evaluate medications/consider consulting pharmacy, Bed/chair exit alarm, Patient to call before getting OOB Elimination Interventions: Bed/chair exit alarm, Call light in reach, Patient to call for help with toileting needs, Toileting schedule/hourly rounds History of Falls Interventions: Bed/chair exit alarm, Door open when patient unattended, Evaluate medications/consider consulting pharmacy Problem: Patient Education: Go to Patient Education Activity Goal: Patient/Family Education Outcome: Progressing Towards Goal 
  
Problem: Pressure Injury - Risk of 
Goal: *Prevention of pressure injury Description: Document Josue Scale and appropriate interventions in the flowsheet. Outcome: Progressing Towards Goal 
Note: Pressure Injury Interventions: 
Sensory Interventions: Assess changes in LOC Moisture Interventions: Absorbent underpads, Apply protective barrier, creams and emollients Activity Interventions: Increase time out of bed, Pressure redistribution bed/mattress(bed type) Mobility Interventions: Pressure redistribution bed/mattress (bed type), HOB 30 degrees or less, Float heels Nutrition Interventions: Document food/fluid/supplement intake Friction and Shear Interventions: Lift sheet Problem: Patient Education: Go to Patient Education Activity Goal: Patient/Family Education Outcome: Progressing Towards Goal

## 2020-10-06 NOTE — PROGRESS NOTES
Speech Therapy Chart reviewed and spoke with RN. Note patient appears to be tolerating clears free of s/s of aspiration when she takes small sips. Coughing noted with larger gulps. EGD planned for Thursday. Will f/u pending results. Dionna Schrader M.S., CCC-SLP

## 2020-10-07 NOTE — PROGRESS NOTES
[facility] 
[address] [facility], [address] GI PROGRESS NOTE [facility], [facility] office [phone] NP in-hospital cell phone M-F until 4:30 After 5pm or on weekends, please call  for physician on call NAME: Omari Eastman :  5/15/1927 MRN:  [id number] Subjective: No acute events, tolerating clears. Daughter-in-law at bedside updated. Objective: VITALS:  
Last 24hrs VS reviewed since prior progress note. Most recent are: 
Visit Vitals /69 (BP 1 Location: Left arm) Pulse 97 Temp 97 °F (36.1 °C) Resp 20 Ht 5' 2\" (1.575 m) Wt 59 kg (130 lb) SpO2 94% Breastfeeding No  
BMI 23.78 kg/m² PHYSICAL EXAM: 
General: No acute distress   
Neurologic:  alert HEENT: EOMI, no scleral icterus Lungs:  CTA bilaterally. No wheezing Heart:  S1 S2 Abdomen: Soft, non-distended, no tenderness. +Bowel sounds Extremities: warm Psych:   Not anxious or agitated Lab Data Reviewed:  
 
Recent Results (from the past 24 hour(s)) GLUCOSE, POC Collection Time: 10/06/20  1:37 PM  
Result Value Ref Range Glucose (POC) 84 65 - 100 mg/dL Performed by Tres Luque Collection Time: 10/06/20  1:57 PM  
Result Value Ref Range Calcium, ionized (POC) 1.16 1.12 - 1.32 mmol/L  
 FIO2 (POC) 0.21 % pH (POC) 7.45 7.35 - 7.45    
 pCO2 (POC) 37.6 35.0 - 45.0 MMHG  
 pO2 (POC) 66 (L) 80 - 100 MMHG  
 HCO3 (POC) 26.0 22 - 26 MMOL/L Base excess (POC) 2 mmol/L  
 sO2 (POC) 94 92 - 97 % Site LEFT RADIAL Device: ROOM AIR Allens test (POC) YES Specimen type (POC) ARTERIAL    
GLUCOSE, POC Collection Time: 10/07/20 12:07 AM  
Result Value Ref Range Glucose (POC) 102 (H) 65 - 100 mg/dL Performed by Anmol Andrew METABOLIC PANEL, BASIC Collection Time: 10/07/20  1:39 AM  
Result Value Ref Range Sodium 138 136 - 145 mmol/L  Potassium 3.2 (L) 3.5 - 5.1 mmol/L  
 Chloride 104 97 - 108 mmol/L  
 CO2 28 21 - 32 mmol/L Anion gap 6 5 - 15 mmol/L Glucose 91 65 - 100 mg/dL BUN 9 6 - 20 MG/DL Creatinine 0.83 0.55 - 1.02 MG/DL  
 BUN/Creatinine ratio 11 (L) 12 - 20 GFR est AA >60 >60 ml/min/1.73m2 GFR est non-AA >60 >60 ml/min/1.73m2 Calcium 8.1 (L) 8.5 - 10.1 MG/DL  
CBC WITH AUTOMATED DIFF Collection Time: 10/07/20  1:39 AM  
Result Value Ref Range WBC 8.7 3.6 - 11.0 K/uL  
 RBC 3.99 3.80 - 5.20 M/uL  
 HGB 13.1 11.5 - 16.0 g/dL HCT 39.9 35.0 - 47.0 % .0 (H) 80.0 - 99.0 FL  
 MCH 32.8 26.0 - 34.0 PG  
 MCHC 32.8 30.0 - 36.5 g/dL  
 RDW 18.1 (H) 11.5 - 14.5 % PLATELET 672 829 - 998 K/uL MPV 10.2 8.9 - 12.9 FL  
 NRBC 0.2 (H) 0  WBC ABSOLUTE NRBC 0.02 (H) 0.00 - 0.01 K/uL NEUTROPHILS 72 32 - 75 % LYMPHOCYTES 12 12 - 49 % MONOCYTES 9 5 - 13 % EOSINOPHILS 5 0 - 7 % BASOPHILS 1 0 - 1 % IMMATURE GRANULOCYTES 1 (H) 0.0 - 0.5 % ABS. NEUTROPHILS 6.4 1.8 - 8.0 K/UL  
 ABS. LYMPHOCYTES 1.0 0.8 - 3.5 K/UL  
 ABS. MONOCYTES 0.8 0.0 - 1.0 K/UL  
 ABS. EOSINOPHILS 0.4 0.0 - 0.4 K/UL  
 ABS. BASOPHILS 0.1 0.0 - 0.1 K/UL  
 ABS. IMM. GRANS. 0.1 (H) 0.00 - 0.04 K/UL  
 DF AUTOMATED    
GLUCOSE, POC Collection Time: 10/07/20  6:28 AM  
Result Value Ref Range Glucose (POC) 97 65 - 100 mg/dL Performed by Reno Farmer Assessment: · Dysphagia: UGI limited by aspiration · Nausea Patient Active Problem List  
Diagnosis Code  TIA (transient ischemic attack) G45.9  
 UTI (urinary tract infection) N39.0  Goals of care, counseling/discussion Z71.89  
 Failure to thrive in adult R62.7  Feeding difficulties R63.3  Impaired mobility Z74.09 Plan: · Diet per SLP, NPO midnight · Eliquis on hold · Plan for EGD 10:30am tomorrow · Palliative care following Signed By: Naomi Vee NP   
 10/7/2020  9:22 AM  
 
 GI Attending: Agree with above plan. EGD planned for tomorrow.  Please keep NPO after midnight and continue to hold Eliquis. Davi Snow MD

## 2020-10-07 NOTE — WOUND CARE
Wound Care Note:  
 
Re-consulted by nurse request for redness under R breast 
 
Chart shows: 
Admitted for TIA, UTI, counseling/education goals of care, failure to thrive in adult, feeding difficulties and impaired mobility Past Medical History:  
Diagnosis Date  CAD (coronary artery disease)  Gastrointestinal disorder WBC = 8.7 on 10/7/20 Admitted from home Assessment:  
Patient is alert and talking, incontinent with assistance needed in repositioning. Bed: [employer] Patient wearing briefs for incontinence. Diet: Clear liquid with nutritional supplements Patient reports no pain. Bilateral heels, buttocks, and sacral skin intact and without erythema. 1. Maculopapular erythematous rash with satellite lesions consistent with yeast to right breast.  Antifungal powder to be ordered. Spoke with Dr. Bill Piper, wound care orders obtained. Patient repositioned on left side. Heels offloaded on pillow. Recommendations:   
Bilateral breast- Every 12 hours gently cleanse with soap and water, blot dry, apply antifungal powder and gently rub into skin Skin Care & Pressure Prevention: 
Minimize layers of linen/pads under patient to optimize support surface. Turn/reposition approximately every 2 hours and offload heels. Manage incontinence / promote continence Nourishing Skin Cream to dry skin, minimize use of briefs when able Discussed above plan with patient & Neli Hernandez RN Transition of Care: Plan to follow as needed while admitted to hospital. 
 
EZEQUIEL Guerra, RN, [facility], [facility]. 
office [phone] 
pager [phone] or call  to page

## 2020-10-07 NOTE — PROGRESS NOTES
Problem: Mobility Impaired (Adult and Pediatric) Goal: *Acute Goals and Plan of Care (Insert Text) Description: FUNCTIONAL STATUS PRIOR TO ADMISSION: Patient was independent and active without the use of DME prior to her shoulder injury (R humeral fx w/ ORIF) about a month ago. Since that time, the patient has relied on a manual wheelchair pushed by caregivers for ambulation. She is able to walk short distances with considerable assistance from caregivers. She was not able to sit unsupported. HOME SUPPORT PRIOR TO ADMISSION: The patient lived alone with no support required prior to her shoulder injury. She discharged from [facility] to her son and daughter in [address] [place] [address]. Caregivers assist with adls and mobility. Patient is open to home health for PT/ OT. Physical Therapy Goals Initiated 9/28/2020 1. Patient will move from supine to sit and sit to supine , scoot up and down, and roll side to side in bed with moderate assistance  within 7 day(s). 2.  Patient will transfer from bed to chair and chair to bed with moderate assistance  using the least restrictive device within 7 day(s). 3.  Patient will perform sit to stand with moderate assistance  within 7 day(s). 4.  Patient will ambulate with moderate assistance  for 25 feet with the least restrictive device within 7 day(s). Outcome: Progressing Towards Goal 
 PHYSICAL THERAPY TREATMENT Patient: Herbert You (52 y.o. female) Date: 10/7/2020 Diagnosis: TIA (transient ischemic attack) [G45.9] UTI (urinary tract infection) [N39.0] <principal problem not specified> Procedure(s) (LRB): ESOPHAGOGASTRODUODENOSCOPY (EGD)   :- (N/A) Precautions: Fall(RUE sling s/p ORIF approx 4 wks ago) Chart, physical therapy assessment, plan of care and goals were reviewed. ASSESSMENT Patient continues with skilled PT services and is not progressing towards goals. Patient much more alert today and interactive and appropriate. Received up in chair and worked on transfer back to bed. DNL present and engaged. Did require max/mod assist of 2 to lateral transfer to bed using recliner with drop arm. Balance remains poor and poor initiation of mobility. Strength is limited as well as endurance. Family remains set on taking her home but willing to hire caregivers as needed. Danish Benavides Current Level of Function Impacting Discharge (mobility/balance): max x 2 for basic mobility; poor balance Other factors to consider for discharge: PLAN : 
Patient continues to benefit from skilled intervention to address the above impairments. Continue treatment per established plan of care. to address goals. Recommendation for discharge: (in order for the patient to meet his/her long term goals) Physical therapy at least 2 days/week in the home AND ensure assist and/or supervision for safety with all mobility This discharge recommendation: 
Has been made in collaboration with the attending provider and/or case management IF patient discharges home will need the following DME: to be determined (TBD) SUBJECTIVE:  
Patient stated Thank you.  OBJECTIVE DATA SUMMARY:  
Critical Behavior: 
Neurologic State: Alert, Appropriate for age Orientation Level: Appropriate for age, Oriented to person, Oriented to place, Oriented to situation Cognition: Appropriate for age attention/concentration, Follows commands, Memory loss, Poor safety awareness Safety/Judgement: Decreased awareness of environment Functional Mobility Training: 
Bed Mobility: 
Rolling: Maximum assistance Sit to Supine: Maximum assistance;Assist x2 Scooting: Maximum assistance;Assist x2 Transfers: 
  
  
     
Bed to Chair: Assist x2;Maximum assistance Balance: 
Sitting: Impaired Sitting - Static: Poor (constant support) Sitting - Dynamic: Poor (constant support) After treatment patient left in no apparent distress: Patient positioned in left sidelying for pressure relief, Call bell within reach, Caregiver / family present, and Side rails x 3 
 
COMMUNICATION/COLLABORATION:  
The patients plan of care was discussed with: Registered nurse. Vesna Fonseca, PT Time Calculation: 15 mins

## 2020-10-07 NOTE — PROGRESS NOTES
Occupational Therapy: Initiated session. Mobility team arrived to [facility] patient to chair. Aborted. Will follow.  
MAGDALENE Abbott/VISHAL

## 2020-10-07 NOTE — PROGRESS NOTES
[facility] [facility]  [facility]ospitalist Group Hospitalist Progress Note Judit An MD 
Answering service: [phone] OR [phone] from in house phone Date of Service:  10/7/2020 NAME:  Herbert You :  5/15/1927 MRN:  [id number] Admission Summary:  
 
Herbert You is a 80 y.o. female who presents with slurred speech and weakness. Patient was seen in ER one day ago for stroke symptoms and worked up including had an MRI which was negative for acute stroke. She was discharged home and she presents back with persistent slurred speech and weakness. There was no report of facial asymmetry or droop, focal hand or leg weakness. There is more like generalized weakness. While in the ER blood sugar per EMS was 177. Vital signs in ED showed temperature 98.3 pulse rate of 100 respiratory 23 pressure 114/71 O2 sats 97% on room air When she was in the ED, EKG showed A. fib rate controlled She had a CT scan code neuro which was negative acute stroke or large vessel occlusion She was deemed not a tpa candidate per tele neuro. An MRI of the brain has been ordered and was pending The patient was in the room with her son. She feels like nothing is wrong with her. She had a recent right shoulder fracture for which she had been to rehab. She reports that the right shoulder still hurtful and also she is having nausea associated with this. Interval history / Subjective:  
 
Pt seen in room denies fevers or chills. Patient is seen in the room. She is up in a chair. She is more interactive today. Awaiting EGD tomorrow. Denies any complaints today. She is still not having a good appetite. Denies pain. Assessment & Plan:  
 
Slurred speech and generalized weakness possible due to TIA vs UTI 
-has slurred speech, possible due to dry tongue from dehydratin -continue on Eliquis, aspirin and pravachol 
-CT head no acute change - 9/26 and 9/27 
-CTA head/CT perfusion no acute large vessel occlusion  - 9/26 and 9/27 
-MRI chronic white matter disease with no acute infarction. - 9/26 and 9/27 
-lipid panel is normal 
-troponin <0.05 
-continue neuro checks 
-consult to speech therapist  
-Seen by Neurology - thinks from UTI and not realy a true seizure or CVA. -Continue to monitor 
-neurology re eval after recurrence of slurred speech - doesn't think stroke. Thyroid cyst and abnormal thyroid function 
-2.6 cm cystic nodule right thyroid lobe on CTA head, not sure clinical significance -TSH elevated, free T4 normal, patient on amiodarone  
-repeat thyroid function in 4 weeks Dehydration due to poor oral intake and UTI 
-encourage po intake, normal saline at 100 ml/34 
-add multivitamin  
-monitor electrolytes  
-D/w Son - they are not interested in feeding tubes. UTI POA 
-urine cx showing pseudomonas - switch to zosyn - completed on 10/7 
- Recheck Culture per family request 10/4 - so far ngtd Can use cipro however she has prolonged qtc and hence limited in options Chronic A Fib, rate controlled 
-EKG atrial fibrillation vent rate 102 non specific st t wave 
-continue on amiodarone, metoprolol and eliquis  
-10/3, EKG checked which showed A. fib. Hyperglycemia on admission -A1c 5.6 
-no further intervention Hx of right humerus fracture s/p ORIF - poa 
-has right shoulder pain 
-continue prn tylenol COVID 19 test on 9/29 negative Code status: DNR 
DVT prophylaxis: Eliquis Care Plan discussed with: Patient/Family, Nurse and  Anticipated Disposition: SNF Anticipated Discharge: 24 hours to 48 hours , Family planning to take patient home after egd. 10/1,10/2,3,4 D/w Son Vanessa  
 
-10/3 -had another episode of slurred speech.   Check CT scan of the head now.  Check UA resolution of UTI. Blood glucose okay. Closely monitor with neuro checks. Will request neuro relook. Check Orthostatics  
-10/4 - Seen By neurology doesn't think stroke. Seen by GI , D/w Son he would like a Barium study - ordered. Also check Urine culture for resolution of UTI. C/w ABX. Son would like increased appetite - discussed about remeron in gareement after discussing side effect profile 10/5- Had [address] study today. GI following Request palliative care eval to discuss about goals of care. D/w Son at bedside 10/6-barium study showing aspiration. Speech evaluation now recommended to be on thin liquids. Aspiration precautions. His hearing to be lethargic today. Possibly secondary to Remeron started for appetite stimulation. This has been discontinued. POC blood glucose showed 84 ABG unremarkable. She is awakening and fully interactive at this time. Continue to monitor. GI planning for endoscopy on Thursday, after being off Eliquis for 2 days. 10/7 -  Poor po intake, palliative in consult. EGD tomorrow, family to decid on plan of care after egd. Hospital Problems  Never Reviewed Codes Class Noted POA Goals of care, counseling/discussion ICD-10-CM: Z71.89 ICD-9-CM: V65.49  Unknown Unknown Failure to thrive in adult ICD-10-CM: R62.7 ICD-9-CM: 783.7  Unknown Unknown Feeding difficulties ICD-10-CM: R63.3 ICD-9-CM: 433. 3  Unknown Unknown Impaired mobility ICD-10-CM: Z74.09 
ICD-9-CM: 799.89  Unknown Unknown UTI (urinary tract infection) ICD-10-CM: N39.0 ICD-9-CM: 599.0  9/29/2020 Unknown  
   
 TIA (transient ischemic attack) ICD-10-CM: G45.9 ICD-9-CM: 435.9  9/27/2020 Unknown Vital Signs:  
 Last 24hrs VS reviewed since prior progress note. Most recent are: 
Visit Vitals /69 (BP 1 Location: Left arm) Pulse 97 Temp 97 °F (36.1 °C) Resp 20 Ht 5' 2\" (1.575 m) Wt 59 kg (130 lb) SpO2 94% Breastfeeding No  
BMI 23.78 kg/m² Intake/Output Summary (Last 24 hours) at 10/7/2020 1133 Last data filed at 10/7/2020 7922 Gross per 24 hour Intake 3221 ml Output  Net 3221 ml Physical Examination:  
     
Gen:  No distress, alert HEENT:  Normal cephalic atraumatic, extra-occular movements are intact. Neck:  Supple, No JVD Lungs:  Clear bilaterally, no wheeze, no rales, normal effort Heart:  Regular Rate and Rhythm, normal S1 and S2, no edema Abdomen:  Soft, non tender, normal bowel sounds, no guarding. Extremities:  Well perfused, no cyanosis or edema Neurological:  Awake and alert, CN's are intact, normal strength throughout extremities, Slurred speech Skin:  No rashes or moles Mental Status:  Normal thought process, does not appear anxious Data Review:  
 Review and/or order of clinical lab test 
Review and/or order of tests in the radiology section of CPT Review and/or order of tests in the medicine section of CPT Labs:  
 
Recent Labs 10/07/20 
0139 10/06/20 
0100 WBC 8.7 10.0 HGB 13.1 13.8 HCT 39.9 41.5  184 Recent Labs 10/07/20 
0139 10/06/20 
0100  137  
K 3.2* 2.9*  
 104 CO2 28 26 BUN 9 10 CREA 0.83 0.83 GLU 91 95  
CA 8.1* 8.0*  
MG  --  1.8 No results for input(s): ALT, AP, TBIL, TBILI, TP, ALB, GLOB, GGT, AML, LPSE in the last 72 hours. No lab exists for component: SGOT, GPT, AMYP, HLPSE No results for input(s): INR, PTP, APTT, INREXT, INREXT in the last 72 hours. No results for input(s): FE, TIBC, PSAT, FERR in the last 72 hours. No results found for: FOL, RBCF No results for input(s): PH, PCO2, PO2 in the last 72 hours. No results for input(s): CPK, CKNDX, TROIQ in the last 72 hours. No lab exists for component: CPKMB Lab Results Component Value Date/Time  Cholesterol, total 123 09/28/2020 04:42 AM  
 HDL Cholesterol 53 09/28/2020 04:42 AM  
 LDL, calculated 55.4 09/28/2020 04:42 AM  
 Triglyceride 73 09/28/2020 04:42 AM  
 CHOL/HDL Ratio 2.3 09/28/2020 04:42 AM  
 
Lab Results Component Value Date/Time Glucose (POC) 97 10/07/2020 06:28 AM  
 Glucose (POC) 102 (H) 10/07/2020 12:07 AM  
 Glucose (POC) 84 10/06/2020 01:37 PM  
 Glucose (POC) 166 (H) 10/04/2020 11:04 AM  
 Glucose (POC) 155 (H) 10/03/2020 12:52 PM  
 
Lab Results Component Value Date/Time Color YELLOW/STRAW 10/03/2020 04:18 PM  
 Appearance CLEAR 10/03/2020 04:18 PM  
 Specific gravity 1.013 10/03/2020 04:18 PM  
 Specific gravity <1.005 09/27/2020 11:10 PM  
 pH (UA) 6.5 10/03/2020 04:18 PM  
 Protein Negative 10/03/2020 04:18 PM  
 Glucose Negative 10/03/2020 04:18 PM  
 Ketone Negative 10/03/2020 04:18 PM  
 Bilirubin Negative 10/03/2020 04:18 PM  
 Urobilinogen 0.2 10/03/2020 04:18 PM  
 Nitrites Negative 10/03/2020 04:18 PM  
 Leukocyte Esterase LARGE (A) 10/03/2020 04:18 PM  
 Epithelial cells FEW 10/03/2020 04:18 PM  
 Bacteria Negative 10/03/2020 04:18 PM  
 WBC 20-50 10/03/2020 04:18 PM  
 RBC 0-5 10/03/2020 04:18 PM  
 
 
 
Medications Reviewed:  
 
Current Facility-Administered Medications Medication Dose Route Frequency  [Held by provider] mirtazapine (REMERON) tablet 7.5 mg  7.5 mg Oral QHS  lactobac ac& pc-s.therm-b.anim (ISABELLE Q/RISAQUAD)  1 Cap Oral DAILY  ondansetron (ZOFRAN) injection 4 mg  4 mg IntraVENous Q6H PRN  
 lactated Ringers infusion  75 mL/hr IntraVENous CONTINUOUS  
 nystatin (MYCOSTATIN) 100,000 unit/mL oral suspension 500,000 Units  500,000 Units Swish and Swallow QID  therapeutic multivitamin (THERAGRAN) tablet 1 Tab  1 Tab Oral DAILY  polyethylene glycol (MIRALAX) packet 17 g  17 g Oral DAILY  docusate sodium (COLACE) capsule 100 mg  100 mg Oral BID PRN  
 albuterol (PROVENTIL VENTOLIN) nebulizer solution 2.5 mg  2.5 mg Nebulization TID PRN  
 amiodarone (CORDARONE) tablet 200 mg  200 mg Oral DAILY  [Held by provider] apixaban (ELIQUIS) tablet 2.5 mg  2.5 mg Oral BID  latanoprost (XALATAN) 0.005 % ophthalmic solution 1 Drop  1 Drop Both Eyes QHS  metoprolol tartrate (LOPRESSOR) tablet 50 mg  50 mg Oral QPM  
 pravastatin (PRAVACHOL) tablet 10 mg  10 mg Oral QHS  timolol (TIMOPTIC) 0.5 % ophthalmic solution 1 Drop  1 Drop Both Eyes BID  acetaminophen (TYLENOL) tablet 650 mg  650 mg Oral Q4H PRN  
 aspirin chewable tablet 81 mg  81 mg Oral DAILY  labetaloL (NORMODYNE;TRANDATE) injection 5 mg  5 mg IntraVENous Q10MIN PRN  
 
______________________________________________________________________ EXPECTED LENGTH OF STAY: 2d 0h 
ACTUAL LENGTH OF STAY:          8 
 
            
Ke Barry MD

## 2020-10-08 NOTE — PROGRESS NOTES
MANDA: 
RUR: 16% Disposition:  Home with Family (son Selvin [place] and spouse). 
 
 Per previous CM initial assessment: \"Patient fell about a month ago. She hurt her right shoulder. She went to [facility] for 3 weeks. She was discharged on 9-22-20 to son and daughter-in-law's house at [address], [place], [address] [facility]. She is open to [facility]. She has been seen by PT and SN once each. OT was scheduled to see her on the day of admission here. . She also had an order for an aid\". CM spoke with Jackalyn Cooks (PT) and confirmed plans as written above. EUGENIA orders noted. CM will call [facility] in the morning to provide updates. CM will speak with family re any additional needs. CM will continue to follow. Chan Romero, [address], [facility] [place]

## 2020-10-08 NOTE — PROGRESS NOTES
Roxana De León 5/15/1927 
[id number] Situation: 
Verbal report received from: Ofelia Barr Procedure: Procedure(s): ESOPHAGOGASTRODUODENOSCOPY (EGD)   :- 
ESOPHAGOGASTRODUODENAL (EGD) BIOPSY Background: 
 
Preoperative diagnosis: Dysphagia Postoperative diagnosis: Small Hiatal Hernia Manzanares's-Appearing Mucosa :  Dr. Lisandro Merritt Assistant(s): Endoscopy Technician-1: Angelica Novoa Endoscopy RN-1: Marylin Espinosa RN Specimens:  
ID Type Source Tests Collected by Time Destination 1 : Distal Esophagus bx Preservative   Orville Olguin MD 10/8/2020 1133 Pathology H. Pylori  no Assessment: Anesthesia gave intra-procedure sedation and medications, see anesthesia flow sheet yes Intravenous fluids: NS@ Darwin Lota Vital signs stable Abdominal assessment: round and soft Recommendation: 
 
Return to floor Permission to share finding with family or friend yes 5

## 2020-10-08 NOTE — PROGRESS NOTES
Problem: Dysphagia (Adult) Goal: *Acute Goals and Plan of Care (Insert Text) Description: Speech therapy goals Initiated 10/5/2020 1. Patient will participate in re-evaluation of swallow function within 7 days Outcome: Progressing Towards Goal 
 SPEECH LANGUAGE PATHOLOGY DYSPHAGIA TREATMENT Patient: Arvind Walton (30 y.o. female) Date: 10/8/2020 Diagnosis: TIA (transient ischemic attack) [G45.9] UTI (urinary tract infection) [N39.0] <principal problem not specified> Procedure(s) (LRB): ESOPHAGOGASTRODUODENOSCOPY (EGD)   :- (N/A) ESOPHAGOGASTRODUODENAL (EGD) BIOPSY (N/A) Day of Surgery Precautions:   Fall(RUE sling s/p ORIF approx 4 wks ago) ASSESSMENT: 
Patient sleeping upon SLP arrival but easily roused. She sipped on broth via cup, self fed. Throat clear x 1 noted, though she completed numerous trials without s/s of aspiration. She was not interested in trials of any other consistency this date. She denied any globus sensation. Unable to advance diet at this time, given patient's disinterest in other consistencies, but she appears to be tolerating clears well. PLAN: 
Recommendations and Planned Interventions: 
Continue clears Following for advancement as able Patient continues to benefit from skilled intervention to address the above impairments. Continue treatment per established plan of care. Discharge Recommendations: To Be Determined SUBJECTIVE:  
Patient stated I feel a lot better. OBJECTIVE:  
Cognitive and Communication Status: 
Neurologic State: Alert Orientation Level: Oriented to person, Oriented to place, Oriented to situation, Oriented to time Cognition: Follows commands Perception: Appears intact Perseveration: No perseveration noted Safety/Judgement: Decreased awareness of environment Dysphagia Treatment: 
Oral Assessment: 
Oral Assessment Labial: No impairment Oral Hygiene: dry Lingual: No impairment Mandible: No impairment P.O. Trials: Patient Position: upright in bed Vocal quality prior to P.O.: No impairment Consistency Presented: Thin liquid How Presented: Cup/sip; Self-fed/presented Bolus Acceptance: No impairment Bolus Formation/Control: No impairment Propulsion: No impairment Oral Residue: None Aspiration Signs/Symptoms: Clear throat(once) Pain: 
Pain Scale 1: Numeric (0 - 10) Pain Intensity 1: 0 After treatment:  
Patient left in no apparent distress in bed, Call bell within reach, and Caregiver / family present COMMUNICATION/EDUCATION:  
Patient was educated regarding role of SLP and purpose of visit. She agreed to participate. The patient's plan of care including recommendations, planned interventions, and recommended diet changes were discussed with: Registered nurse. TEMO Crocker Time Calculation: 12 mins

## 2020-10-08 NOTE — PROGRESS NOTES
Speech Therapy Chart reviewed. Note patient is currently NPO for EGD today. Will hold off on swallow treatment. Thank you. Dionna Abel M.S., CCC-SLP

## 2020-10-08 NOTE — ANESTHESIA PREPROCEDURE EVALUATION
Relevant Problems No relevant active problems Anesthetic History No history of anesthetic complications Review of Systems / Medical History Patient summary reviewed, nursing notes reviewed and pertinent labs reviewed Pulmonary Within defined limits Neuro/Psych  
 
 
 
TIA Cardiovascular Dysrhythmias : atrial fibrillation Pertinent negatives: No angina Exercise tolerance: >4 METS 
  
GI/Hepatic/Renal 
Within defined limits Pertinent negatives: No GERD Endo/Other Within defined limits Other Findings Comments: Alert and oriented; advanced age Physical Exam 
 
Airway Mallampati: III 
TM Distance: < 4 cm Neck ROM: normal range of motion Mouth opening: Diminished (comment) Cardiovascular Rhythm: irregular Dental 
 
Dentition: Upper partial plate and Lower partial plate Pulmonary Breath sounds clear to auscultation Abdominal 
GI exam deferred Other Findings Anesthetic Plan ASA: 4 Anesthesia type: general 
 
 
 
 
Induction: Intravenous Anesthetic plan and risks discussed with: Patient

## 2020-10-08 NOTE — PROGRESS NOTES
Physical Therapy Met with patient's other daughter in law who was at bedside. Patient was off the floor earlier for test and now sleeping soundly. Per DNL, patient has been in and out since returning. Discussed with DNL about recommendations for returning to family's home with home health and additional care givers as needed. Patient has strong family support. She has wheelchair but will likely need carter lift and possibly hospital bed. Will follow up tomorrow.  
Laury Mahan, PT

## 2020-10-08 NOTE — ANESTHESIA POSTPROCEDURE EVALUATION
Post-Anesthesia Evaluation and Assessment Patient: Joaquin Lopez MRN: [id number]  SSN: [id number] YOB: 1927  Age: 80 y.o. Sex: female I have evaluated the patient and they are stable and ready for discharge from the PACU. Cardiovascular Function/Vital Signs Visit Vitals BP (!) 150/85 (BP 1 Location: Right arm, BP Patient Position: At rest) Pulse 94 Temp 36.7 °C (98 °F) Resp 18 Ht 5' 2\" (1.575 m) Wt 59 kg (130 lb) SpO2 95% Breastfeeding No  
BMI 23.78 kg/m² Patient is status post MAC anesthesia for Procedure(s): ESOPHAGOGASTRODUODENOSCOPY (EGD)   :- 
ESOPHAGOGASTRODUODENAL (EGD) BIOPSY. Nausea/Vomiting: None Postoperative hydration reviewed and adequate. Pain: 
Pain Scale 1: Numeric (0 - 10) (10/08/20 1217) Pain Intensity 1: 0 (10/08/20 1217) Managed Neurological Status:  
Neuro Neurologic State: Alert (10/08/20 1424) Orientation Level: Oriented to person;Oriented to place;Oriented to situation;Oriented to time (10/07/20 2314) Cognition: Follows commands (10/08/20 1424) Speech: Slurred (10/08/20 1050) Assessment L Pupil: Brisk;Round (10/08/20 1050) Size L Pupil (mm): 3 (10/08/20 1050) Assessment R Pupil: Brisk;Round (10/08/20 1050) Size R Pupil (mm): 3 (10/08/20 1050) LUE Motor Response: Weak (10/08/20 1050) LLE Motor Response: Weak (10/08/20 1050) RUE Motor Response: Weak (10/08/20 1050) RLE Motor Response: Weak (10/08/20 1050) At baseline Mental Status, Level of Consciousness: Alert and  oriented to person, place, and time Pulmonary Status:  
O2 Device: CO2 nasal cannula (10/08/20 1217) Adequate oxygenation and airway patent Complications related to anesthesia: None Post-anesthesia assessment completed. No concerns Signed By: Milly Mcdonnell MD   
 October 8, 2020 Procedure(s): ESOPHAGOGASTRODUODENOSCOPY (EGD)   :- 
ESOPHAGOGASTRODUODENAL (EGD) BIOPSY. MAC 
 
<BSHSIANPOST> INITIAL Post-op Vital signs:  
Vitals Value Taken Time /85 10/8/2020  2:33 PM  
Temp 36.7 °C (98 °F) 10/8/2020  2:33 PM  
Pulse 94 10/8/2020  2:33 PM  
Resp 18 10/8/2020  2:33 PM  
SpO2 95 % 10/8/2020  2:33 PM

## 2020-10-08 NOTE — PROCEDURES
[address], [address] [place] [address] Esophago- Gastroduodenoscopy (EGD) Procedure Note Roxana De León 5/15/1927 
[id number] Procedure: Endoscopic Gastroduodenoscopy --diagnostic, with biopsy Indication: dysphagia Pre-operative Diagnosis: see indication above Post-operative Diagnosis: see findings below : Lala Bazzi. Carlee Salas MD 
 
Referring Provider:  Sarah Jimenez MD 
 
 
Anesthesia/Sedation:  MAC anesthesia Propofol Procedure Details After informed consent was obtained for the procedure, with all risks and benefits of procedure explained the patient was taken to the endoscopy suite and placed in the left lateral decubitus position. Following sequential administration of sedation as per above, the endoscope was inserted into the mouth and advanced under direct vision to second portion of the duodenum. A careful inspection was made as the gastroscope was withdrawn, including a retroflexed view of the proximal stomach; findings and interventions are described below. Findings:  
Esophagus: short <1 cm segment of Manzanares's appearing mucosa. No nodules seen with both white light endoscopy and NBI. Cold biopsies taken of distal esophagus. Remainder of esophagus was normal appearing. Stomach: small hiatal hernia, otherwise normal appearing stomach. Patent pylorus Duodenum: normal to second portion Therapies:  As above Specimens: 1. Distal esophagus EBL: None Complications:   None; patient tolerated the procedure well. Impression: As above Recommendations: 
1. Follow up surgical pathology 2. Okay to restart Eliquis on Sunday 3. Clear liquid diet. Diet advancement for SLP recommendations 4. Will follow. Please call with any questions Signed By: Lala Salas MD   
 10/8/2020  11:40 AM

## 2020-10-08 NOTE — PROGRESS NOTES
[address] [facility] Adult  Hospitalist Group Hospitalist Progress Note Keyla Berrios MD 
Answering service: [phone] OR [phone] from in house phone Date of Service:  10/8/2020 NAME:  Luis F Brizuela :  5/15/1927 MRN:  [id number] Admission Summary:  
 
Luis F Brizuela is a 80 y.o. female who presents with slurred speech and weakness. Patient was seen in ER one day ago for stroke symptoms and worked up including had an MRI which was negative for acute stroke. She was discharged home and she presents back with persistent slurred speech and weakness. There was no report of facial asymmetry or droop, focal hand or leg weakness. There is more like generalized weakness. While in the ER blood sugar per EMS was 177. Vital signs in ED showed temperature 98.3 pulse rate of 100 respiratory 23 pressure 114/71 O2 sats 97% on room air When she was in the ED, EKG showed A. fib rate controlled She had a CT scan code neuro which was negative acute stroke or large vessel occlusion She was deemed not a tpa candidate per tele neuro. An MRI of the brain has been ordered and was pending The patient was in the room with her son. She feels like nothing is wrong with her. She had a recent right shoulder fracture for which she had been to rehab. She reports that the right shoulder still hurtful and also she is having nausea associated with this. Interval history / Subjective:  
 
Patient seen and examined this afternoon. Daughter in law at beside. Patient has tolerated the procedure well. Assessment & Plan: # Dysphagia - s/p EGD showing <1cm segment of Manzanares's appearing mucosa. Cold biopsies taken otherwise remainder of the esophagus appears normal, stomach small hiatal hernia  
- on clear liquid - speech following, plan to advance diet as tolerated # Slurred speech and generalized weakness possible due to TIA vs UTI 
-has slurred speech, possible due to dry tongue from dehydratin 
-continue on Eliquis, aspirin and pravachol 
-CT head no acute change - 9/26 and 9/27 
-CTA head/CT perfusion no acute large vessel occlusion  - 9/26 and 9/27 
-MRI chronic white matter disease with no acute infarction. - 9/26 and 9/27 
-lipid panel is normal 
-troponin <0.05 
-continue neuro checks 
-consult to speech therapist  
-Seen by Neurology - thinks from UTI and not realy a true seizure or CVA. -Continue to monitor 
-neurology re eval after recurrence of slurred speech - doesn't think stroke. Thyroid cyst and abnormal thyroid function 
-2.6 cm cystic nodule right thyroid lobe on CTA head, not sure clinical significance -TSH elevated, free T4 normal, patient on amiodarone  
-repeat thyroid function in 4 weeks Dehydration due to poor oral intake and UTI 
-encourage po intake, normal saline at 100 ml/34 
-add multivitamin  
-monitor electrolytes  
-D/w Son - they are not interested in feeding tubes. UTI POA 
-urine cx showing pseudomonas - switch to zosyn - completed on 10/7 
- Recheck Culture per family request 10/4 - so far ngtd Can use cipro however she has prolonged qtc and hence limited in options Chronic A Fib, rate controlled 
-EKG atrial fibrillation vent rate 102 non specific st t wave 
-continue on amiodarone, metoprolol and eliquis  
-10/3, EKG checked which showed A. fib. Hyperglycemia on admission -A1c 5.6 
-no further intervention Hx of right humerus fracture s/p ORIF - poa 
- has right shoulder pain 
- continue prn tylenol # Hypokalemia  
- replete as needed COVID 19 test on 9/29 negative Code status: DNR 
DVT prophylaxis: Eliquis Care Plan discussed with: Patient/Family, Nurse and  Anticipated Disposition: SNF Anticipated Discharge: 24 hours to 48 hours , Home . D/w Son Vanessa  Hospital Problems  Never Reviewed Codes Class Noted POA Goals of care, counseling/discussion ICD-10-CM: Z71.89 ICD-9-CM: V65.49  Unknown Unknown Failure to thrive in adult ICD-10-CM: R62.7 ICD-9-CM: 783.7  Unknown Unknown Feeding difficulties ICD-10-CM: R63.3 ICD-9-CM: 978. 3  Unknown Unknown Impaired mobility ICD-10-CM: Z74.09 
ICD-9-CM: 799.89  Unknown Unknown UTI (urinary tract infection) ICD-10-CM: N39.0 ICD-9-CM: 599.0  9/29/2020 Unknown  
   
 TIA (transient ischemic attack) ICD-10-CM: G45.9 ICD-9-CM: 435.9  9/27/2020 Unknown Vital Signs:  
 Last 24hrs VS reviewed since prior progress note. Most recent are: 
Visit Vitals BP (!) 153/85 (BP 1 Location: Left arm, BP Patient Position: At rest) Pulse (!) 109 Temp 97.6 °F (36.4 °C) Resp 16 Ht 5' 2\" (1.575 m) Wt 59 kg (130 lb) SpO2 97% Breastfeeding No  
BMI 23.78 kg/m² Intake/Output Summary (Last 24 hours) at 10/8/2020 1813 Last data filed at 10/8/2020 1135 Gross per 24 hour Intake 922.5 ml Output  Net 922.5 ml Physical Examination:  
     
Gen:  No distress, alert HEENT:  Normal cephalic atraumatic, extra-occular movements are intact. Neck:  Supple, No JVD Lungs:  Clear bilaterally, no wheeze, no rales, normal effort Heart:  Regular Rate and Rhythm, normal S1 and S2, no edema Abdomen:  Soft, non tender, normal bowel sounds, no guarding. Extremities:  Well perfused, no cyanosis or edema Neurological:  Awake and alert, CN's are intact, normal strength throughout extremities, Slurred speech Skin:  No rashes or moles Mental Status:  Normal thought process, does not appear anxious Data Review:  
I personally reviewed labs and imaging Labs:  
 
Recent Labs 10/08/20 
0258 10/07/20 
0139 WBC 8.6 8.7 HGB 13.4 13.1 HCT 39.9 39.9  163 Recent Labs 10/08/20 
0258 10/07/20 
0139 10/06/20 
0100  138 137  
K 3.1* 3.2* 2.9*  
  104 104 CO2 26 28 26 BUN 8 9 10 CREA 0.63 0.83 0.83 GLU 90 91 95  
CA 8.3* 8.1* 8.0*  
MG  --   --  1.8 No results for input(s): ALT, AP, TBIL, TBILI, TP, ALB, GLOB, GGT, AML, LPSE in the last 72 hours. No lab exists for component: SGOT, GPT, AMYP, HLPSE No results for input(s): INR, PTP, APTT, INREXT, INREXT in the last 72 hours. No results for input(s): FE, TIBC, PSAT, FERR in the last 72 hours. No results found for: FOL, RBCF No results for input(s): PH, PCO2, PO2 in the last 72 hours. No results for input(s): CPK, CKNDX, TROIQ in the last 72 hours. No lab exists for component: CPKMB Lab Results Component Value Date/Time Cholesterol, total 123 09/28/2020 04:42 AM  
 HDL Cholesterol 53 09/28/2020 04:42 AM  
 LDL, calculated 55.4 09/28/2020 04:42 AM  
 Triglyceride 73 09/28/2020 04:42 AM  
 CHOL/HDL Ratio 2.3 09/28/2020 04:42 AM  
 
Lab Results Component Value Date/Time Glucose (POC) 117 (H) 10/08/2020 12:39 PM  
 Glucose (POC) 82 10/08/2020 06:20 AM  
 Glucose (POC) 81 10/07/2020 11:43 PM  
 Glucose (POC) 97 10/07/2020 06:28 AM  
 Glucose (POC) 102 (H) 10/07/2020 12:07 AM  
 
Lab Results Component Value Date/Time Color YELLOW/STRAW 10/03/2020 04:18 PM  
 Appearance CLEAR 10/03/2020 04:18 PM  
 Specific gravity 1.013 10/03/2020 04:18 PM  
 Specific gravity <1.005 09/27/2020 11:10 PM  
 pH (UA) 6.5 10/03/2020 04:18 PM  
 Protein Negative 10/03/2020 04:18 PM  
 Glucose Negative 10/03/2020 04:18 PM  
 Ketone Negative 10/03/2020 04:18 PM  
 Bilirubin Negative 10/03/2020 04:18 PM  
 Urobilinogen 0.2 10/03/2020 04:18 PM  
 Nitrites Negative 10/03/2020 04:18 PM  
 Leukocyte Esterase LARGE (A) 10/03/2020 04:18 PM  
 Epithelial cells FEW 10/03/2020 04:18 PM  
 Bacteria Negative 10/03/2020 04:18 PM  
 WBC 20-50 10/03/2020 04:18 PM  
 RBC 0-5 10/03/2020 04:18 PM  
 
 
 
Medications Reviewed:  
 
Current Facility-Administered Medications Medication Dose Route Frequency  sodium chloride (NS) flush 5-40 mL  5-40 mL IntraVENous Q8H  
 sodium chloride (NS) flush 5-40 mL  5-40 mL IntraVENous PRN  
 miconazole (MICOTIN) 2 % powder   Topical Q12H  
 melatonin tablet 3 mg  3 mg Oral QHS PRN  
 [Held by provider] mirtazapine (REMERON) tablet 7.5 mg  7.5 mg Oral QHS  lactobac ac& pc-s.therm-b.anim (ISABELLE Q/RISAQUAD)  1 Cap Oral DAILY  ondansetron (ZOFRAN) injection 4 mg  4 mg IntraVENous Q6H PRN  
 lactated Ringers infusion  75 mL/hr IntraVENous CONTINUOUS  
 nystatin (MYCOSTATIN) 100,000 unit/mL oral suspension 500,000 Units  500,000 Units Swish and Swallow QID  therapeutic multivitamin (THERAGRAN) tablet 1 Tab  1 Tab Oral DAILY  polyethylene glycol (MIRALAX) packet 17 g  17 g Oral DAILY  docusate sodium (COLACE) capsule 100 mg  100 mg Oral BID PRN  
 albuterol (PROVENTIL VENTOLIN) nebulizer solution 2.5 mg  2.5 mg Nebulization TID PRN  
 amiodarone (CORDARONE) tablet 200 mg  200 mg Oral DAILY  [Held by provider] apixaban (ELIQUIS) tablet 2.5 mg  2.5 mg Oral BID  latanoprost (XALATAN) 0.005 % ophthalmic solution 1 Drop  1 Drop Both Eyes QHS  metoprolol tartrate (LOPRESSOR) tablet 50 mg  50 mg Oral QPM  
 pravastatin (PRAVACHOL) tablet 10 mg  10 mg Oral QHS  timolol (TIMOPTIC) 0.5 % ophthalmic solution 1 Drop  1 Drop Both Eyes BID  acetaminophen (TYLENOL) tablet 650 mg  650 mg Oral Q4H PRN  
 aspirin chewable tablet 81 mg  81 mg Oral DAILY  labetaloL (NORMODYNE;TRANDATE) injection 5 mg  5 mg IntraVENous Q10MIN PRN  
 
______________________________________________________________________ EXPECTED LENGTH OF STAY: 2d 0h 
ACTUAL LENGTH OF STAY:          9 Joe Kenny MD

## 2020-10-08 NOTE — PROGRESS NOTES
Occupational Therapy: Chart reviewed. Patient currently off of the floor for test. Will follow and see as able and appropriate. Weekly reassessment due at next visit.  
MAGDALENE Burgos/VISHAL

## 2020-10-08 NOTE — PROGRESS NOTES
Received report from anesthesia staff on vital signs and status of patient. Scope precleaned at bedside by Simona Dan

## 2020-10-09 NOTE — PROGRESS NOTES
Problem: Mobility Impaired (Adult and Pediatric) Goal: *Acute Goals and Plan of Care (Insert Text) Description: FUNCTIONAL STATUS PRIOR TO ADMISSION: Patient was independent and active without the use of DME prior to her shoulder injury (R humeral fx w/ ORIF) about a month ago. Since that time, the patient has relied on a manual wheelchair pushed by caregivers for ambulation. She is able to walk short distances with considerable assistance from caregivers. She was not able to sit unsupported. HOME SUPPORT PRIOR TO ADMISSION: The patient lived alone with no support required prior to her shoulder injury. She discharged from [facility] to her son and daughter in [address] [place] [address]. Caregivers assist with adls and mobility. Patient is open to home health for PT/ OT. Physical Therapy Goals Initiated 9/28/2020 1. Patient will move from supine to sit and sit to supine , scoot up and down, and roll side to side in bed with moderate assistance  within 7 day(s). 2.  Patient will transfer from bed to chair and chair to bed with moderate assistance  using the least restrictive device within 7 day(s). 3.  Patient will perform sit to stand with moderate assistance  within 7 day(s). 4.  Patient will ambulate with moderate assistance  for 25 feet with the least restrictive device within 7 day(s). Outcome: Not Progressing Towards Goal 
 PHYSICAL THERAPY TREATMENT Patient: Katharine Correia (28 y.o. female) Date: 10/9/2020 Diagnosis: TIA (transient ischemic attack) [G45.9] UTI (urinary tract infection) [N39.0] <principal problem not specified> Procedure(s) (LRB): ESOPHAGOGASTRODUODENOSCOPY (EGD)   :- (N/A) ESOPHAGOGASTRODUODENAL (EGD) BIOPSY (N/A) 1 Day Post-Op Precautions: Fall(RUE sling s/p ORIF approx 4 wks ago) Chart, physical therapy assessment, plan of care and goals were reviewed. ASSESSMENT Patient continues with skilled PT services and is not progressing towards goals. Patient received in bed and son present. Session focused on education of son on bed exercises as well as working on sitting balance. Patient remains far below her baseline with significant trunk weakness, requiring max to total assist for bed mobility, sitting balance. Deferred transfers today as she is going for barium swallow later. Family plan is for patient to return home with family and will have constant assist with home health. Leighton Dimas Current Level of Function Impacting Discharge (mobility/balance): max to total assist for basic bed mobility, poor sitting balance, carter lift for staff to transfer to chair Other factors to consider for discharge: PLAN : 
Patient continues to benefit from skilled intervention to address the above impairments. Continue treatment per established plan of care. to address goals. Recommendation for discharge: (in order for the patient to meet his/her long term goals) Physical therapy at least 2 days/week in the home AND ensure assist and/or supervision for safety with all mobility This discharge recommendation: 
Has been made in collaboration with the attending provider and/or case management IF patient discharges home will need the following DME: mechanical lift SUBJECTIVE:  
Patient stated I just want to sleep.  OBJECTIVE DATA SUMMARY:  
Critical Behavior: 
Neurologic State: Alert, Appropriate for age Orientation Level: Oriented X4(periods of confusion) Cognition: Follows commands Safety/Judgement: Decreased awareness of environment Functional Mobility Training: 
Bed Mobility: 
Rolling: Maximum assistance Supine to Sit: Maximum assistance Sit to Supine: Maximum assistance;Assist x2 Scooting: Total assistance Balance: 
Sitting: Impaired Sitting - Static: Poor (constant support) Sitting - Dynamic: Poor (constant support) Therapeutic Exercises: Bed: heel slides with resistance on extension, SAQ, SLR , ankle pumps Activity Tolerance:  
Poor and requires rest breaks After treatment patient left in no apparent distress:  
Supine in bed, Call bell within reach, and Caregiver / family present COMMUNICATION/COLLABORATION:  
The patients plan of care was discussed with: Registered nurse. Gama Pal, PT Time Calculation: 26 mins

## 2020-10-09 NOTE — PROGRESS NOTES
Music Therapy Assessment Ron Orellana [address] [phone] 5/15/1927  80 y.o.  female Patient Telephone Number: [phone] (home) Rastafari Affiliation: Mu-ism Language: [place] Patient Active Problem List  
 Diagnosis Date Noted  Goals of care, counseling/discussion  Failure to thrive in adult  Feeding difficulties  Impaired mobility  UTI (urinary tract infection) 09/29/2020  TIA (transient ischemic attack) 09/27/2020 Date: 10/9/2020            Total Time (in minutes): 10          [address] MED ONCOLOGY Mental Status:   [x] Alert [  ] Wilbern Arlin [  ]  Confused  [  ] Minimally responsive  [  ] Sleeping Communication Status: [  ] Impaired Speech [  ] Nonverbal -N/A Physical Status:   [x] Oxygen in use  [  ] Hard of Hearing [  ] Vision Impaired [  ] Ambulatory  [  ] Ambulatory with assistance [  ] Non-ambulatory Music Preferences, Background: Old [employer] that were performed on the [employer], [address]. Clinical Problem addressed: Support relaxation, healthy pt/family coping. Goal(s) met in session: 
Physical/Pain management (Scale of 1-10): Pre-session rating: Pt denied pain. Post-session rating: Pt denied pain. [x] Increased relaxation   [  ] Affected breathing patterns [  ] Decreased muscle tension   [  ] Decreased agitation [  ] Affected heart rate    [  ] Increased alertness Emotional/Psychological: 
[  ] Increased self-expression   [  ] Decreased aggressive behavior [  ] Decreased feelings of stress  [  ] Discussed healthy coping skills [  ] Improved mood    [  ] Decreased withdrawn behavior Social: 
[  ] Decreased feelings of isolation/loneliness [  ] Positive social interaction  
[x] Provided support and/or comfort for family/friends Spiritual: 
[  ] Spiritual support    [  ] Expressed peace [  ] Expressed cherelle    [  ] Discussed beliefs Techniques Utilized (Check all that apply):  
[  ] Procedural support MT [x] Music for relaxation [x] Patient preferred music 
[  ] Letty analysis  [  ] Song choice  [  ] Music for validation [  ] Entrainment  [  ] Movement to music [  ] Guided visualization [  ] Thais Barrientos  [  ] Patient instrument playing [  ] Enrique Robbin writing [  ] Pj Zheng along   [  ] Dipak Topock  [  ] Sensory stimulation [  ] Active Listening  [  ] Music for spiritual support [  ] Making of CDs as gifts Session Observations:  Referral from [facility], [address] [place] [address]. Patient (pt) was alert and her eyes appeared tired as she was lying in bed. Her son Jaycee Denton was at bedside. Pt reported feeling tired when this music therapist and music therapy intern (MT Team) asked how she was feeling. MT Team asked pt about her music preferences. Pt said she liked everything, and her son shared more specifically about some of her music preferences. She chose to hear a soft, slow tempo song. MT Team sang and played The Way You Look Tonight with guitar. Pt appeared drowsy during the music and her son engaged in eye contact with the MT Team at times. At the end of the song, pt expressed enjoyment in the music and then said she only wanted to hear that one song because now she wanted to go to sleep. MT Team thanked pt for communicating what she needed with the MT Team, and expressed understanding. Pt's son thanked MT Team for the session. Will follow as able. THEO FallBC (Music Therapist-Board Certified) And 
Marleny Monaco, Music Therapy Intern Spiritual Care Department Referral-based service

## 2020-10-09 NOTE — PROGRESS NOTES
[address] [facility] Adult  Hospitalist Group Hospitalist Progress Note Flaquita Lang MD 
Answering service: [phone] OR [phone] from in house phone Date of Service:  10/9/2020 NAME:  Areli Carter :  5/15/1927 MRN:  [id number] Admission Summary:  
 
Areli aCrter is a 80 y.o. female who presents with slurred speech and weakness. Patient was seen in ER one day ago for stroke symptoms and worked up including had an MRI which was negative for acute stroke. She was discharged home and she presents back with persistent slurred speech and weakness. There was no report of facial asymmetry or droop, focal hand or leg weakness. There is more like generalized weakness. While in the ER blood sugar per EMS was 177. Vital signs in ED showed temperature 98.3 pulse rate of 100 respiratory 23 pressure 114/71 O2 sats 97% on room air When she was in the ED, EKG showed A. fib rate controlled She had a CT scan code neuro which was negative acute stroke or large vessel occlusion She was deemed not a tpa candidate per tele neuro. An MRI of the brain has been ordered and was pending The patient was in the room with her son. She feels like nothing is wrong with her. She had a recent right shoulder fracture for which she had been to rehab. She reports that the right shoulder still hurtful and also she is having nausea associated with this. Interval history / Subjective:  
 
Patient seen and examined this afternoon. Son at bedside feeding patient. Patient has acceptable oral intake for puree diet. No nausea or vomiting. No pain. \"I'm always tired and wants to sleep\". Per son patient is up living by herself until 3 weeks ago. Assessment & Plan: # Dysphagia - s/p EGD showing <1cm segment of Manzanares's appearing mucosa.  Cold biopsies taken otherwise remainder of the esophagus appears normal, stomach small hiatal hernia  
- tolerated clear liquid and now on puree diet - speech following, plan to further advance diet as tolerated # Slurred speech and generalized weakness possible due to TIA vs UTI 
- has slurred speech, possible due to dry tongue from dehydratin 
- continue on Eliquis ( now on hold will resume on Sunday), aspirin and pravachol 
- CT head no acute change - 9/26 and 9/27 
- CTA head/CT perfusion no acute large vessel occlusion  - 9/26 and 9/27 
- MRI chronic white matter disease with no acute infarction. - 9/26 and 9/27 
- lipid panel is normal 
- troponin <0.05 
- continue neuro checks 
- consult to speech therapist  
- Seen by Neurology - thinks from UTI and not realy a true seizure or CVA. - Continue to monitor 
- neurology re eval after recurrence of slurred speech - doesn't think stroke. # Thyroid cyst and abnormal thyroid function 
-2.6 cm cystic nodule right thyroid lobe on CTA head, not sure clinical significance -TSH elevated, free T4 normal, patient on amiodarone  
-repeat thyroid function in 4 weeks # Dehydration due to poor oral intake and UTI 
- encourage po intake, normal saline reduce rate  
- add multivitamin  
- monitor electrolytes - Per report D/w Son - they are not interested in feeding tubes. # UTI (POA) 
- urine cx showing pseudomonas - switch to zosyn - completed on 10/7 
- Recheck Culture per family request 10/4 - so far ngtd Can use cipro however she has prolonged qtc and hence limited in options # Chronic A Fib, rate controlled 
- EKG atrial fibrillation vent rate 102 non specific st t wave 
- continue on amiodarone, metoprolol and eliquis  
-10/3, EKG checked which showed A. Fib. # Hyperglycemia on admission 
- A1c 5.6 
- no further intervention # Hx of right humerus fracture s/p ORIF - poa 
- has right shoulder pain 
- continue prn tylenol # Hypokalemia  
- replete as needed COVID 19 test on 9/29 negative Code status: DNR 
DVT prophylaxis: Eliquis Care Plan discussed with: Patient/Family, Nurse and  Anticipated Disposition: SNF Anticipated Discharge: 24 hours to 48 hours , Home . D/w Son Vanessa Agosto [phone] Hospital Problems  Never Reviewed Codes Class Noted POA Goals of care, counseling/discussion ICD-10-CM: Z71.89 ICD-9-CM: V65.49  Unknown Unknown Failure to thrive in adult ICD-10-CM: R62.7 ICD-9-CM: 783.7  Unknown Unknown Feeding difficulties ICD-10-CM: R63.3 ICD-9-CM: 207. 3  Unknown Unknown Impaired mobility ICD-10-CM: Z74.09 
ICD-9-CM: 799.89  Unknown Unknown UTI (urinary tract infection) ICD-10-CM: N39.0 ICD-9-CM: 599.0  9/29/2020 Unknown  
   
 TIA (transient ischemic attack) ICD-10-CM: G45.9 ICD-9-CM: 435.9  9/27/2020 Unknown Vital Signs:  
 Last 24hrs VS reviewed since prior progress note. Most recent are: 
Visit Vitals /81 (BP 1 Location: Right arm, BP Patient Position: At rest) Pulse 88 Temp 98.6 °F (37 °C) Resp 16 Ht 5' 2\" (1.575 m) Wt 59 kg (130 lb) SpO2 97% Breastfeeding No  
BMI 23.78 kg/m² No intake or output data in the 24 hours ending 10/09/20 1730 Physical Examination:  
     
Gen:  No distress, alert HEENT:  Normal cephalic atraumatic, extra-occular movements are intact. Neck:  Supple, No JVD Lungs:  Clear bilaterally, no wheeze, no rales, normal effort Heart:  Regular Rate and Rhythm, normal S1 and S2, no edema Abdomen:  Soft, non tender, normal bowel sounds, no guarding. Extremities:  Well perfused, no cyanosis or edema Neurological:  Awake and alert, CN's are intact, normal strength throughout extremities, Slurred speech Skin:  No rashes or moles Mental Status:  Normal thought process, does not appear anxious Data Review:  
I personally reviewed labs and imaging Labs:  
 
Recent Labs 10/08/20 
0258 10/07/20 
0139 WBC 8.6 8.7 HGB 13.4 13.1 HCT 39.9 39.9  163 Recent Labs 10/08/20 
0258 10/07/20 
0139  138  
K 3.1* 3.2*  
 104 CO2 26 28 BUN 8 9 CREA 0.63 0.83 GLU 90 91 CA 8.3* 8.1* No results for input(s): ALT, AP, TBIL, TBILI, TP, ALB, GLOB, GGT, AML, LPSE in the last 72 hours. No lab exists for component: SGOT, GPT, AMYP, HLPSE No results for input(s): INR, PTP, APTT, INREXT, INREXT in the last 72 hours. No results for input(s): FE, TIBC, PSAT, FERR in the last 72 hours. No results found for: FOL, RBCF No results for input(s): PH, PCO2, PO2 in the last 72 hours. No results for input(s): CPK, CKNDX, TROIQ in the last 72 hours. No lab exists for component: CPKMB Lab Results Component Value Date/Time Cholesterol, total 123 09/28/2020 04:42 AM  
 HDL Cholesterol 53 09/28/2020 04:42 AM  
 LDL, calculated 55.4 09/28/2020 04:42 AM  
 Triglyceride 73 09/28/2020 04:42 AM  
 CHOL/HDL Ratio 2.3 09/28/2020 04:42 AM  
 
Lab Results Component Value Date/Time Glucose (POC) 96 10/09/2020 06:13 AM  
 Glucose (POC) 84 10/09/2020 12:01 AM  
 Glucose (POC) 141 (H) 10/08/2020 06:24 PM  
 Glucose (POC) 117 (H) 10/08/2020 12:39 PM  
 Glucose (POC) 82 10/08/2020 06:20 AM  
 
Lab Results Component Value Date/Time Color YELLOW/STRAW 10/03/2020 04:18 PM  
 Appearance CLEAR 10/03/2020 04:18 PM  
 Specific gravity 1.013 10/03/2020 04:18 PM  
 Specific gravity <1.005 09/27/2020 11:10 PM  
 pH (UA) 6.5 10/03/2020 04:18 PM  
 Protein Negative 10/03/2020 04:18 PM  
 Glucose Negative 10/03/2020 04:18 PM  
 Ketone Negative 10/03/2020 04:18 PM  
 Bilirubin Negative 10/03/2020 04:18 PM  
 Urobilinogen 0.2 10/03/2020 04:18 PM  
 Nitrites Negative 10/03/2020 04:18 PM  
 Leukocyte Esterase LARGE (A) 10/03/2020 04:18 PM  
 Epithelial cells FEW 10/03/2020 04:18 PM  
 Bacteria Negative 10/03/2020 04:18 PM  
 WBC 20-50 10/03/2020 04:18 PM  
 RBC 0-5 10/03/2020 04:18 PM  
 
 
 
Medications Reviewed: Current Facility-Administered Medications Medication Dose Route Frequency  potassium bicarb-citric acid (EFFER-K) tablet 20 mEq  20 mEq Oral BID  sodium chloride (NS) flush 5-40 mL  5-40 mL IntraVENous Q8H  
 sodium chloride (NS) flush 5-40 mL  5-40 mL IntraVENous PRN  
 miconazole (MICOTIN) 2 % powder   Topical Q12H  
 melatonin tablet 3 mg  3 mg Oral QHS PRN  
 [Held by provider] mirtazapine (REMERON) tablet 7.5 mg  7.5 mg Oral QHS  lactobac ac& pc-s.therm-b.anim (ISABELLE Q/RISAQUAD)  1 Cap Oral DAILY  ondansetron (ZOFRAN) injection 4 mg  4 mg IntraVENous Q6H PRN  
 lactated Ringers infusion  40 mL/hr IntraVENous CONTINUOUS  
 nystatin (MYCOSTATIN) 100,000 unit/mL oral suspension 500,000 Units  500,000 Units Swish and Swallow QID  therapeutic multivitamin (THERAGRAN) tablet 1 Tab  1 Tab Oral DAILY  polyethylene glycol (MIRALAX) packet 17 g  17 g Oral DAILY  docusate sodium (COLACE) capsule 100 mg  100 mg Oral BID PRN  
 albuterol (PROVENTIL VENTOLIN) nebulizer solution 2.5 mg  2.5 mg Nebulization TID PRN  
 amiodarone (CORDARONE) tablet 200 mg  200 mg Oral DAILY  [Held by provider] apixaban (ELIQUIS) tablet 2.5 mg  2.5 mg Oral BID  latanoprost (XALATAN) 0.005 % ophthalmic solution 1 Drop  1 Drop Both Eyes QHS  metoprolol tartrate (LOPRESSOR) tablet 50 mg  50 mg Oral QPM  
 pravastatin (PRAVACHOL) tablet 10 mg  10 mg Oral QHS  timolol (TIMOPTIC) 0.5 % ophthalmic solution 1 Drop  1 Drop Both Eyes BID  acetaminophen (TYLENOL) tablet 650 mg  650 mg Oral Q4H PRN  
 aspirin chewable tablet 81 mg  81 mg Oral DAILY  labetaloL (NORMODYNE;TRANDATE) injection 5 mg  5 mg IntraVENous Q10MIN PRN  
 
______________________________________________________________________ EXPECTED LENGTH OF STAY: 2d 0h 
ACTUAL LENGTH OF STAY:          Medhat Moore MD

## 2020-10-09 NOTE — PROGRESS NOTES
Problem: Dysphagia (Adult) Goal: *Acute Goals and Plan of Care (Insert Text) Description: Speech therapy goals Initiated 10/5/2020 1. Patient will participate in re-evaluation of swallow function within 7 days MET New goal 10/9/2020  2. Patient will participate in [facility] for further evaluation of swallow physiology within 7 days. MET New goal 10/9/2020  3. Patient will tolerate pureed diet, thin liquids without overt s/s aspiration within 7 days. 10/9/2020 1315 by TEMO Urbano Outcome: Progressing Towards Goal 
  
SPEECH PATHOLOGY MODIFIED BARIUM SWALLOW STUDY Patient: Tristian Ayala (65 y.o. female) Date: 10/9/2020 Primary Diagnosis: TIA (transient ischemic attack) [G45.9] UTI (urinary tract infection) [N39.0] Procedure(s) (LRB): ESOPHAGOGASTRODUODENOSCOPY (EGD)   :- (N/A) ESOPHAGOGASTRODUODENAL (EGD) BIOPSY (N/A) 1 Day Post-Op Precautions:   Fall(RUE sling s/p ORIF approx 4 wks ago) ASSESSMENT : 
Based on the objective data described below, the patient presents with mild oropharyngeal dysphagia characterized by mildly delayed oral manipulation/bolus formation, delayed oral transit, mild pharyngeal swallow delay and mildly reduced tongue base retraction. Dysphagia resulted in mild pharyngeal (vallecula > pyriform) residue and trace, flash penetration of thin liquids x 1 after the swallow. No aspiration of any consistency observed. Liquid wash was effective in clearing pharyngeal residue. Patient given barium tablet in attempt to assess swallow physiology with medication however patient demonstrated chewing of tablet which resulted in coughing. No aspiration observed under fluoroscopy and suspect coughing related to taste of chewed tablet. Risk of aspiration is low and feel patient is safe for regular diet/thin liquids. After discussion with patient's son plan is to continue with pureed diet for now and advance to regular diet in 1-2 days. Patient will benefit from skilled intervention to address the above impairments. Patients rehabilitation potential is considered to be Good PLAN : 
Recommendations and Planned Interventions: 
Pureed diet for now. Advance to regular diet as tolerated in 1-2 days at family request 
Thin liquids Sit up for all PO Small bites Single sips Alternate solids and liquids Frequency/Duration: Patient will be followed by speech-language pathology 2 times a week to address goals. Discharge Recommendations: To Be Determined SUBJECTIVE:  
Patient stated I'm afraid. Patient unable to tell what she is afraid of. SLP remained with patient for duration of MBS. OBJECTIVE:  
 
Past Medical History:  
Diagnosis Date CAD (coronary artery disease) Gastrointestinal disorder History reviewed. No pertinent surgical history. Prior Level of Function/Home Situation:  
Home Situation Home Environment: Private residence # Steps to Enter: 5 Rails to Enter: Yes One/Two Story Residence: Two story, live on 1st floor Living Alone: No 
Support Systems: Family member(s), Home care staff Patient Expects to be Discharged to[de-identified] Private residence Current DME Used/Available at Home: [employer]) Tub or Shower Type: Shower Diet prior to admission: Regular Current Diet:  Clear liquids Radiologist: Dr. Audrey Olsen Film Views: Lateral;Fluoro Patient Position: Upright in [facility] [place] [place] chair Trial 1: Trial 2:  
Consistency Presented: Thin liquid Consistency Presented: Puree; Solid How Presented: Straw How Presented: SLP-fed/presented;Spoon Bolus Acceptance: No impairment Bolus Acceptance: No impairment Bolus Formation/Control: Impaired: Delayed Bolus Formation/Control: Impaired: Delayed Propulsion: Delayed (# of seconds) Propulsion: Delayed (# of seconds) Oral Residue: None Oral Residue: Lingual  
Initiation of Swallow: Triggered at vallecula Initiation of Swallow: Triggered at base of tongue Timing: Pooling 1-5 sec Timing: No impairment Penetration: Trace; After swallow;Flash/transient Penetration: None Aspiration/Timing: No evidence of aspiration Aspiration/Timing: No evidence of aspiration Pharyngeal Clearance: Vallecular residue; Less than 10% Pharyngeal Clearance: Vallecular residue;10-50% Attempted Modifications: Alternate liquids/solids Effective Modifications: Alternate liquids/solids Decreased Tongue Base Retraction?: Yes Laryngeal Elevation: WFL (within functional limits) Aspiration/Penetration Score: 2 (Penetration/No residue-Contrast enters the airway penetrates, remains above the folds/cords, and is cleared) Pharyngeal Symmetry: Not assessed Pharyngeal-Esophageal Segment: No impairment Pharyngeal Dysfunction: None Oral Phase Severity: Mild Pharyngeal Phase Severity: Mild NOMS:  
The NOMS functional outcome measure was used to quantify this patient's level of swallowing impairment. Based on the NOMS, the patient was determined to be at level 4 for swallow function NOMS Swallowing Levels: 
Level 1 (CN): NPO Level 2 (CM): NPO but takes consistency in therapy Level 3 (CL): Takes less than 50% of nutrition p.o. and continues with nonoral feedings; and/or safe with mod cues; and/or max diet restriction Level 4 (CK): Safe swallow but needs mod cues; and/or mod diet restriction; and/or still requires some nonoral feeding/supplements Level 5 (CJ): Safe swallow with min diet restriction; and/or needs min cues Level 6 (CI): Independent with p.o.; rare cues; usually self cues; may need to avoid some foods or needs extra time Level 7 (CH): Independent for all p.o. DAVID. (2003). National Outcomes Measurement System (NOMS): Adult Speech-Language Pathology User's Guide. COMMUNICATION/EDUCATION:  
Patient was educated regarding MBS results and recommendations. Patient kept eyes closed.   Patient's son asked appropriate questions and verbalized understanding. The patients plan of care including findings from [facility], recommendations, planned interventions, and recommended diet changes were discussed with: Registered nurse. Patient/family have participated as able in goal setting and plan of care. Patient/family agree to work toward stated goals and plan of care. Thank you for this referral. 
Franklyn Dobson SLP Time Calculation: 30 mins

## 2020-10-09 NOTE — PROGRESS NOTES
Problem: Dysphagia (Adult) Goal: *Acute Goals and Plan of Care (Insert Text) Description: Speech therapy goals Initiated 10/5/2020 1. Patient will participate in re-evaluation of swallow function within 7 days MET New goal 10/9/2020  2. Patient will participate in [facility] for further evaluation of swallow physiology within 7 days. Outcome: Progressing Towards Goal 
  
SPEECH LANGUAGE PATHOLOGY DYSPHAGIA TREATMENT Patient: Jeffrey Waggoner (17 y.o. female) Date: 10/9/2020 Diagnosis: TIA (transient ischemic attack) [G45.9] UTI (urinary tract infection) [N39.0] <principal problem not specified> Procedure(s) (LRB): ESOPHAGOGASTRODUODENOSCOPY (EGD)   :- (N/A) ESOPHAGOGASTRODUODENAL (EGD) BIOPSY (N/A) 1 Day Post-Op Precautions:  Fall(RUE sling s/p ORIF approx 4 wks ago) ASSESSMENT: 
Patient presents with grossly functional oropharyngeal swallow however patient continues to report liquids take time to go down. After very small bite of puree patient reports sensation that purees became stuck. This was relieved with multiple sips of liquids. Patient tolerated all PO trials without overt s/s aspiration. RN reports patient took medication without difficulty. Given patient continues to complain of globus sensation feel MBS is warranted to further assess swallow physiology. PLAN: 
Recommendations and Planned Interventions: MBS - planned for this date at 2pm 
Patient reports tolerance of clear liquid diet however patient's son would like to try soup and mashed potatoes this date given lack of finding on EGD. Will advance diet to purees. Patient continues to benefit from skilled intervention to address the above impairments. Continue treatment per established plan of care. Discharge Recommendations: To Be Determined SUBJECTIVE:  
Patient stated Please don't give up on me. Patient reports purees \"get stuck\" and that liquids \"take time to go down. \" 
 RN reports patient takes pills whole without difficulty. OBJECTIVE:  
Cognitive and Communication Status: 
Neurologic State: Alert Orientation Level: Oriented X4(with periodic confusion) Cognition: Follows commands Perception: Appears intact Perseveration: No perseveration noted Safety/Judgement: Decreased awareness of environment Dysphagia Treatment: 
Oral Assessment: P.O. Trials: 
Patient Position: Upright in bed Vocal quality prior to P.O.: No impairment Consistency Presented: Thin liquid;Puree How Presented: Self-fed/presented;SLP-fed/presented Bolus Acceptance: No impairment Bolus Formation/Control: No impairment Propulsion: No impairment Oral Residue: None Initiation of Swallow: No impairment Laryngeal Elevation: Functional 
Aspiration Signs/Symptoms: None Pharyngeal Phase Characteristics: Feeling of discomfort; Foreign body sensation Effective Modifications: Alternate liquids/solids After treatment:  
Patient left in no apparent distress in bed, Call bell within reach, and Nursing notified COMMUNICATION/EDUCATION:  
Patient was educated regarding role of SLP and POC. Patient verbalized understanding. The patient's plan of care including recommendations, planned interventions, and recommended diet changes were discussed with: Registered nurse. TEMO Reno Time Calculation: 20 mins

## 2020-10-09 NOTE — PROGRESS NOTES
[facility] 
[address] [facility], [address] GI PROGRESS NOTE Will Samara Savage, [address] [facility] office [phone] NP/PA in-hospital cell phone M-F until 4:30PM 
After 5PM or on weekends, please call  for physician on call NAME: Oli Gilmore :  5/15/1927 MRN:  [id number] Subjective:  
History is limited from the patient. Son is at the bedside. EGD was done yesterday. MBS was completed today and she was started on a pureed diet. Objective: VITALS:  
Last 24hrs VS reviewed since prior progress note. Most recent are: 
Visit Vitals /82 (BP 1 Location: Left arm, BP Patient Position: At rest) Pulse 85 Temp 97.4 °F (36.3 °C) Resp 16 Ht 5' 2\" (1.575 m) Wt 59 kg (130 lb) SpO2 94% Breastfeeding No  
BMI 23.78 kg/m² PHYSICAL EXAM: 
General: Cooperative, no acute distress   
Neurologic:  Alert HEENT: EOMI, no scleral icterus Lungs:  No respiratory distress Heart:  S1 S2 Abdomen: Soft, non-distended, no tenderness, no guarding, no rebound. +Bowel sounds. Extremities: Warm Psych:   Not anxious or agitated Lab Data Reviewed:  
 
Recent Results (from the past 24 hour(s)) GLUCOSE, POC Collection Time: 10/08/20  6:24 PM  
Result Value Ref Range Glucose (POC) 141 (H) 65 - 100 mg/dL Performed by Kvng Lawrence GLUCOSE, POC Collection Time: 10/09/20 12:01 AM  
Result Value Ref Range Glucose (POC) 84 65 - 100 mg/dL Performed by Funmi Cuellar GLUCOSE, POC Collection Time: 10/09/20  6:13 AM  
Result Value Ref Range Glucose (POC) 96 65 - 100 mg/dL Performed by Funmi Cuellar Assessment: · Dysphagia: UGI limited by aspiration.  EGD (10/8/20): short <1 cm segment of Manzanares's appearing mucosa, no nodules seen with both white light endoscopy and NBI, cold biopsies taken of distal esophagus; remainder of esophagus was normal appearing; small hiatal hernia, otherwise normal appearing stomach and patent pylorus; normal duodenum. Modified barium swallow today, 10/9. · Nausea Patient Active Problem List  
Diagnosis Code  TIA (transient ischemic attack) G45.9  
 UTI (urinary tract infection) N39.0  Goals of care, counseling/discussion Z71.89  
 Failure to thrive in adult R62.7  Feeding difficulties R63.3  Impaired mobility Z74.09 Plan: · Diet advancement per SLP 
· OK to restart Eliquis on Sunday · Follow EGD pathology · We will be available again as needed. Please call us with any questions. Thank you. Signed By: RYANNE Auguste   
 10/9/2020  2:00 PM 
  
 
GI Attending: Agree with above plan. We will sign off for now. Please call with any questions. Davi Galvin MD

## 2020-10-10 NOTE — PROGRESS NOTES
[address] [facility] Adult  Hospitalist Group Hospitalist Progress Note Kenroy Madden MD 
Answering service: [phone] OR [phone] from in house phone Date of Service:  10/10/2020 NAME:  Bri Diez :  5/15/1927 MRN:  [id number] Admission Summary:  
 
Bri Diez is a 80 y.o. female who presents with slurred speech and weakness. Patient was seen in ER one day ago for stroke symptoms and worked up including had an MRI which was negative for acute stroke. She was discharged home and she presents back with persistent slurred speech and weakness. There was no report of facial asymmetry or droop, focal hand or leg weakness. There is more like generalized weakness. While in the ER blood sugar per EMS was 177. Vital signs in ED showed temperature 98.3 pulse rate of 100 respiratory 23 pressure 114/71 O2 sats 97% on room air When she was in the ED, EKG showed A. fib rate controlled She had a CT scan code neuro which was negative acute stroke or large vessel occlusion She was deemed not a tpa candidate per tele neuro. An MRI of the brain has been ordered and was pending The patient was in the room with her son. She feels like nothing is wrong with her. She had a recent right shoulder fracture for which she had been to rehab. She reports that the right shoulder still hurtful and also she is having nausea associated with this. Interval history / Subjective:  
 
Patient seen and examined this afternoon. Son at bedside. Patient looks more weak today than yesterday. Per son she is refusing to eat. Patient is taking gibberish in between but oriented X 1. No lateralizing weakness noted. Assessment & Plan: # Debility # Acute metabolic encephalopathy/ hospital delirium - Discussed with son, no prior hx of dementia.  But given patient's decline physically, language and loss of appetite, this could be end stage dementia - Palliative care if family agrees  
- ?? Depression ( psychiatry consult per family request) # Dysphagia - s/p EGD showing <1cm segment of Manzanares's appearing mucosa. Cold biopsies taken otherwise remainder of the esophagus appears normal, stomach small hiatal hernia  
- on puree diet, tolerating well but not interested to eat - speech following, plan to further advance diet as tolerated - Per report D/w Son - they are not interested in feeding tubes. # Slurred speech and generalized weakness possible due to TIA vs UTI 
- has slurred speech, possible due to dry tongue from dehydratin 
- continue on Eliquis ( now on hold will resume on Sunday), aspirin and pravachol 
- CT head no acute change - 9/26 and 9/27 
- CTA head/CT perfusion no acute large vessel occlusion  - 9/26 and 9/27 
- MRI chronic white matter disease with no acute infarction. - 9/26 and 9/27 
- lipid panel is normal 
- troponin <0.05 
- continue neuro checks 
- consult to speech therapist  
- Seen by Neurology - thinks from UTI and not realy a true seizure or CVA. - Continue to monitor 
- neurology re eval after recurrence of slurred speech - doesn't think stroke. # Thyroid cyst and abnormal thyroid function 
- 2.6 cm cystic nodule right thyroid lobe on CTA head, not sure clinical significance -TSH elevated, free T4 normal, patient on amiodarone  
- repeat thyroid function in 4 weeks # Dehydration due to poor oral intake 
- encourage po intake, switch to D5W-1/2NS 
- ct with multivitamin  
- monitor electrolytes - Per report D/w Son - they are not interested in feeding tubes. # UTI (POA) 
- urine cx showing pseudomonas - switch to zosyn - completed on 10/7 
- Recheck Culture per family request 10/4 - so far ngtd # Chronic A Fib, rate controlled 
- continue on amiodarone, metoprolol and eliquis ( will re-initiate on Sunday) -10/3, EKG checked which showed A. Fib. # Hyperglycemia on admission 
- A1c 5.6 
- no further intervention # Hx of right humerus fracture s/p ORIF - poa 
- has right shoulder pain 
- continue prn tylenol # Hypokalemia  
- replete as needed COVID 19 test on 9/29 negative Code status: DNR 
DVT prophylaxis: Eliquis Care Plan discussed with: Patient/Family, Nurse and  Anticipated Disposition: SNF Anticipated Discharge: D/w Jose Guillaume [phone] Hospital Problems  Never Reviewed Codes Class Noted POA Goals of care, counseling/discussion ICD-10-CM: Z71.89 ICD-9-CM: V65.49  Unknown Unknown Failure to thrive in adult ICD-10-CM: R62.7 ICD-9-CM: 783.7  Unknown Unknown Feeding difficulties ICD-10-CM: R63.3 ICD-9-CM: 644. 3  Unknown Unknown Impaired mobility ICD-10-CM: Z74.09 
ICD-9-CM: 799.89  Unknown Unknown UTI (urinary tract infection) ICD-10-CM: N39.0 ICD-9-CM: 599.0  9/29/2020 Unknown  
   
 TIA (transient ischemic attack) ICD-10-CM: G45.9 ICD-9-CM: 435.9  9/27/2020 Unknown Vital Signs:  
 Last 24hrs VS reviewed since prior progress note. Most recent are: 
Visit Vitals BP (!) 126/90 (BP 1 Location: Left arm, BP Patient Position: At rest) Pulse (!) 107 Temp 97.7 °F (36.5 °C) Resp 18 Ht 5' 2\" (1.575 m) Wt 59 kg (130 lb) SpO2 92% Breastfeeding No  
BMI 23.78 kg/m² Intake/Output Summary (Last 24 hours) at 10/10/2020 1521 Last data filed at 10/9/2020 1813 Gross per 24 hour Intake 60 ml Output  Net 60 ml Physical Examination:  
     
Gen:  No distress, awake but oriented X 1 HEENT:  Normal cephalic atraumatic, extra-occular movements are intact. Neck:  Supple, No JVD Lungs:  Clear bilaterally, no wheeze, no rales, normal effort Heart:  Regular Rate and Rhythm, normal S1 and S2, no edema Abdomen:  Soft, non tender, normal bowel sounds, no guarding. Extremities:  Well perfused, no cyanosis or edema Neurological:  Slurred speech, follows simple command Skin:  No rashes or moles Data Review:  
I personally reviewed labs and imaging Labs:  
 
Recent Labs 10/08/20 
0258 WBC 8.6 HGB 13.4 HCT 39.9  Recent Labs 10/08/20 
0258   
K 3.1*  
 CO2 26 BUN 8  
CREA 0.63 GLU 90  
CA 8.3* No results for input(s): ALT, AP, TBIL, TBILI, TP, ALB, GLOB, GGT, AML, LPSE in the last 72 hours. No lab exists for component: SGOT, GPT, AMYP, HLPSE No results for input(s): INR, PTP, APTT, INREXT, INREXT in the last 72 hours. No results for input(s): FE, TIBC, PSAT, FERR in the last 72 hours. No results found for: FOL, RBCF No results for input(s): PH, PCO2, PO2 in the last 72 hours. No results for input(s): CPK, CKNDX, TROIQ in the last 72 hours. No lab exists for component: CPKMB Lab Results Component Value Date/Time Cholesterol, total 123 09/28/2020 04:42 AM  
 HDL Cholesterol 53 09/28/2020 04:42 AM  
 LDL, calculated 55.4 09/28/2020 04:42 AM  
 Triglyceride 73 09/28/2020 04:42 AM  
 CHOL/HDL Ratio 2.3 09/28/2020 04:42 AM  
 
Lab Results Component Value Date/Time Glucose (POC) 99 10/10/2020 05:40 AM  
 Glucose (POC) 122 (H) 10/09/2020 11:55 PM  
 Glucose (POC) 96 10/09/2020 06:13 AM  
 Glucose (POC) 84 10/09/2020 12:01 AM  
 Glucose (POC) 141 (H) 10/08/2020 06:24 PM  
 
Lab Results Component Value Date/Time  Color YELLOW/STRAW 10/03/2020 04:18 PM  
 Appearance CLEAR 10/03/2020 04:18 PM  
 Specific gravity 1.013 10/03/2020 04:18 PM  
 Specific gravity <1.005 09/27/2020 11:10 PM  
 pH (UA) 6.5 10/03/2020 04:18 PM  
 Protein Negative 10/03/2020 04:18 PM  
 Glucose Negative 10/03/2020 04:18 PM  
 Ketone Negative 10/03/2020 04:18 PM  
 Bilirubin Negative 10/03/2020 04:18 PM  
 Urobilinogen 0.2 10/03/2020 04:18 PM  
 Nitrites Negative 10/03/2020 04:18 PM  
 Leukocyte Esterase LARGE (A) 10/03/2020 04:18 PM  
 Epithelial cells FEW 10/03/2020 04:18 PM  
 Bacteria Negative 10/03/2020 04:18 PM  
 WBC 20-50 10/03/2020 04:18 PM  
 RBC 0-5 10/03/2020 04:18 PM  
 
 
 
Medications Reviewed:  
 
Current Facility-Administered Medications Medication Dose Route Frequency  potassium bicarb-citric acid (EFFER-K) tablet 20 mEq  20 mEq Oral BID  sodium chloride (NS) flush 5-40 mL  5-40 mL IntraVENous Q8H  
 sodium chloride (NS) flush 5-40 mL  5-40 mL IntraVENous PRN  
 miconazole (MICOTIN) 2 % powder   Topical Q12H  
 melatonin tablet 3 mg  3 mg Oral QHS PRN  
 [Held by provider] mirtazapine (REMERON) tablet 7.5 mg  7.5 mg Oral QHS  lactobac ac& pc-s.therm-b.anim (ISABELLE Q/RISAQUAD)  1 Cap Oral DAILY  ondansetron (ZOFRAN) injection 4 mg  4 mg IntraVENous Q6H PRN  
 lactated Ringers infusion  40 mL/hr IntraVENous CONTINUOUS  
 nystatin (MYCOSTATIN) 100,000 unit/mL oral suspension 500,000 Units  500,000 Units Swish and Swallow QID  therapeutic multivitamin (THERAGRAN) tablet 1 Tab  1 Tab Oral DAILY  polyethylene glycol (MIRALAX) packet 17 g  17 g Oral DAILY  docusate sodium (COLACE) capsule 100 mg  100 mg Oral BID PRN  
 albuterol (PROVENTIL VENTOLIN) nebulizer solution 2.5 mg  2.5 mg Nebulization TID PRN  
 amiodarone (CORDARONE) tablet 200 mg  200 mg Oral DAILY  [Held by provider] apixaban (ELIQUIS) tablet 2.5 mg  2.5 mg Oral BID  latanoprost (XALATAN) 0.005 % ophthalmic solution 1 Drop  1 Drop Both Eyes QHS  metoprolol tartrate (LOPRESSOR) tablet 50 mg  50 mg Oral QPM  
 pravastatin (PRAVACHOL) tablet 10 mg  10 mg Oral QHS  timolol (TIMOPTIC) 0.5 % ophthalmic solution 1 Drop  1 Drop Both Eyes BID  acetaminophen (TYLENOL) tablet 650 mg  650 mg Oral Q4H PRN  
 aspirin chewable tablet 81 mg  81 mg Oral DAILY  labetaloL (NORMODYNE;TRANDATE) injection 5 mg  5 mg IntraVENous Q10MIN PRN  
 
 ______________________________________________________________________ EXPECTED LENGTH OF STAY: 2d 0h 
ACTUAL LENGTH OF STAY:          Mitzi Moore MD

## 2020-10-10 NOTE — PROGRESS NOTES
Problem: Falls - Risk of 
Goal: *Absence of Falls Description: Document Wood Anderson Fall Risk and appropriate interventions in the flowsheet. Outcome: Progressing Towards Goal 
Note: Fall Risk Interventions: 
Mobility Interventions: Communicate number of staff needed for ambulation/transfer Mentation Interventions: Adequate sleep, hydration, pain control Medication Interventions: Evaluate medications/consider consulting pharmacy Elimination Interventions: Call light in reach History of Falls Interventions: Bed/chair exit alarm Problem: Patient Education: Go to Patient Education Activity Goal: Patient/Family Education Outcome: Progressing Towards Goal 
  
Problem: Pressure Injury - Risk of 
Goal: *Prevention of pressure injury Description: Document Josue Scale and appropriate interventions in the flowsheet. Outcome: Progressing Towards Goal 
Note: Pressure Injury Interventions: 
Sensory Interventions: Assess changes in LOC Moisture Interventions: Absorbent underpads Activity Interventions: Pressure redistribution bed/mattress(bed type) Mobility Interventions: Float heels, HOB 30 degrees or less Nutrition Interventions: Document food/fluid/supplement intake Friction and Shear Interventions: Apply protective barrier, creams and emollients Problem: Patient Education: Go to Patient Education Activity Goal: Patient/Family Education Outcome: Progressing Towards Goal 
  
Problem: Patient Education: Go to Patient Education Activity Goal: Patient/Family Education Outcome: Progressing Towards Goal 
  
Problem: Patient Education: Go to Patient Education Activity Goal: Patient/Family Education Outcome: Progressing Towards Goal 
  
Problem: Urinary Tract Infection - Adult Goal: *Absence of infection signs and symptoms Outcome: Progressing Towards Goal 
  
Problem: Patient Education: Go to Patient Education Activity Goal: Patient/Family Education Outcome: Progressing Towards Goal 
  
Problem: Nutrition Deficit Goal: *Optimize nutritional status Outcome: Progressing Towards Goal 
  
Problem: Patient Education: Go to Patient Education Activity Goal: Patient/Family Education Outcome: Progressing Towards Goal

## 2020-10-11 NOTE — CONSULTS
[address] Name:  Katharine Hyde 
MR#:  [id number] :  05/15/1927 ACCOUNT #:  [de-identified] DATE OF SERVICE:  10/11/2020 CHIEF COMPLAINT:  \"I just feel blah. \" HISTORY OF PRESENT ILLNESS:  The patient is a 44-year-old  female who is currently admitted to the medical floor at [facility].  She was brought to the ER two days ago with a history of having fallen and possibly having suffered a stroke. She was sent back home and then came back again yesterday. The family had noted that she seemed to be out of it. However, CT scan was done which was negative and Neurology ruled out that she had had a stroke. The family had also reported that she had appeared to be somewhat depressed recently. The patient was hospitalized at a rehab in 2020 and had only been released about a week ago. She reports that she has little or no interest in her usual activities, feels down, and depressed almost every day, and struggles to motivate herself to get going in the mornings. Notes that she is sleeping poorly, has lost her appetite and has lost weight, although she could not quantify the exact amount of weight. The history was corroborated by her granddaughter, Prisca Rivera, who was present during the interview. She denies any suicidal ideation or plan. Also notes that her memory has been worse recently which the family has just noted since she fell recently. Struggles to remember things related to her every day schedule and also with relation to her ADLs. Denies any psychotic symptoms to me. She was calm and cooperative during the interview, although she struggled to remember a lot of the details which her granddaughter had to complete for her. PAST MEDICAL HISTORY:  Reviewed as per the history and physical exam. 
 
 
Past Medical History:  
Diagnosis Date  CAD (coronary artery disease)  Gastrointestinal disorder Prior to Admission medications Medication Sig Start Date End Date Taking? Authorizing Provider  
multivitamins-minerals-lutein (MEN'S CENTRUM SILVER WITH LUTEIN) tab tablet Take 1 Tab by mouth daily. Sarah Jimenez MD  
acetaminophen (TYLENOL) 500 mg tablet Take 500 mg by mouth every eight (8) hours. Sarah Jimenez MD  
apixaban (Eliquis) 2.5 mg tablet Take 2.5 mg by mouth two (2) times a day. Sarah Jimenez MD  
metoprolol tartrate (LOPRESSOR) 50 mg tablet Take 50 mg by mouth every evening. Sarah Jimenez MD  
pravastatin (PRAVACHOL) 10 mg tablet Take 10 mg by mouth nightly. Sarah Jimenez MD  
albuterol (PROVENTIL HFA, VENTOLIN HFA, PROAIR HFA) 90 mcg/actuation inhaler Take 1 Puff by inhalation three (3) times daily as needed for Shortness of Breath. Sarah Jimenez MD  
amiodarone (CORDARONE) 200 mg tablet Take 200 mg by mouth daily. Sarah Jimenez MD  
calcium carbonate (TUMS) 200 mg calcium (500 mg) chew Take 1 Tab by mouth two (2) times a day. Sarah Jimenez MD  
timolol (TIMOPTIC) 0.5 % ophthalmic solution Administer 1 Drop to both eyes two (2) times a day. Sarah Jimenez MD  
latanoprost (XALATAN) 0.005 % ophthalmic solution Administer 1 Drop to both eyes nightly. Sarah Jimenez MD  
 
Vitals:  
 10/11/20 1045 10/11/20 1453 10/11/20 1936 10/12/20 0250 BP:  131/78 139/88 (!) 141/85 Pulse:  (!) 105 99 95 Resp:  22 19 20 Temp:  97.6 °F (36.4 °C) 98.4 °F (36.9 °C) 97.7 °F (36.5 °C) SpO2: 93% 95% 97% 91% Weight:      
Height:      
 
Lab Results Component Value Date/Time WBC 10.6 10/12/2020 03:01 AM  
 HGB 14.8 10/12/2020 03:01 AM  
 HCT 45.3 10/12/2020 03:01 AM  
 PLATELET 836 11/98/2753 03:01 AM  
 .0 (H) 10/12/2020 03:01 AM  
 
Lab Results Component Value Date/Time  Sodium 139 10/08/2020 02:58 AM  
 Potassium 3.1 (L) 10/08/2020 02:58 AM  
 Chloride 106 10/08/2020 02:58 AM  
 CO2 26 10/08/2020 02:58 AM  
 Anion gap 7 10/08/2020 02:58 AM  
 Glucose 90 10/08/2020 02:58 AM  
 BUN 8 10/08/2020 02:58 AM  
 Creatinine 0.63 10/08/2020 02:58 AM  
 BUN/Creatinine ratio 13 10/08/2020 02:58 AM  
 GFR est AA >60 10/08/2020 02:58 AM  
 GFR est non-AA >60 10/08/2020 02:58 AM  
 Calcium 8.3 (L) 10/08/2020 02:58 AM  
 Bilirubin, total 0.7 10/01/2020 01:13 AM  
 Alk. phosphatase 88 10/01/2020 01:13 AM  
 Protein, total 5.8 (L) 10/01/2020 01:13 AM  
 Albumin 2.4 (L) 10/01/2020 01:13 AM  
 Globulin 3.4 10/01/2020 01:13 AM  
 A-G Ratio 0.7 (L) 10/01/2020 01:13 AM  
 ALT (SGPT) 24 10/01/2020 01:13 AM  
 AST (SGOT) 17 10/01/2020 01:13 AM  
 
No results found for: VALF2, VALAC, VALP, VALPR, DS6, CRBAM, CRBAMP, CARB2, XCRBAM 
No results found for: LITHM 
RADIOLOGY REPORTS:(reviewed/updated 10/12/2020) Xr Chest Pa Lat Result Date: 9/24/2020 History is shortness of breath. Comparison is to the chest x-ray of 8/31/2020. PA and lateral views of the chest demonstrate persistent cardiomegaly. There is improved aeration of the upper lung zones versus improved patient positioning. There is a very small left pleural effusion now. No pneumonia or pneumothorax is identified. The patient has had interim ORIF of the proximal right humeral fracture with a jerry and multiple screws present. There is anatomic alignment at the fracture site. There is kyphosis and osteopenia of the thoracic spine. IMPRESSION: Cardiomegaly and small left pleural effusion. No pneumonia or pneumothorax. ORIF of right proximal humeral fracture. Please see full report. Xr Shoulder Rt 1v Result Date: 9/27/2020 INDICATION:   pain COMPARISON: September 2, 2020 FINDINGS: Single AP view of the right shoulder demonstrates [a internal fixation of the proximal humerus with intramedullary jerry and numerous fixation screws similar in overall alignment of prior study.  Interval decrease in associated soft tissue swelling. No acute/new fracture. Acromioclavicular joint is intact. IMPRESSION: Recent ORIF right humerus as above, with no acute process. Xr Swallow Func Video Result Date: 10/9/2020 EXAM: XR SWALLOW FUNC VIDEO INDICATION: Dysphagia. Fluoroscopy dose (air kerma):   6.487 mGy   mGy. FINDINGS: Fluoroscopic assistance and image interpretation services were provided to speech therapy for performance of a modified barium swallow. The patient ingested liquid barium, barium mixed with applesauce and barium coated crackers in the lateral position with continuous fluoroscopy. With all consistencies there is normal oral processing and initiation of the swallow and a normal pharyngeal stripping wave minimal pooling within the vallecula. There is no nasopharyngeal reflux, penetration, aspiration or significant retention in the vallecula or piriform sinuses. A pill was attempted but failed. IMPRESSION: No evidence of aspiration or penetration. Minimal pooling within the vallecula. Mri Brain Wo Cont Result Date: 9/26/2020 EXAM: MRI BRAIN WO CONT INDICATION: Episode of slurred speech today COMPARISON: None. CONTRAST: None. TECHNIQUE:  Multiplanar multisequence acquisition without contrast of the brain. FINDINGS: Evaluation is limited by motion and artifact from dental hardware. . Ventricles are normal in size. Subcortical and deep white matter T2 hyperintense foci are present. No evidence of acute hemorrhage. Vague area of increased signal on diffusion-weighted imaging the central midbrain is likely artifactual given the extensive motion. No definite diffusion restriction is present. No mass effect or midline shift, or cerebral edema. There is no cerebellar tonsillar herniation. Expected arterial flow-voids are present. The paranasal sinuses, mastoid air cells, and middle ears are clear. The orbital contents are within normal limits. No significant osseous or scalp lesions are identified. IMPRESSION: Limitations as above. No evidence of acute ischemia. Mild to moderate chronic small vessel ischemic disease Ct Head Wo Cont Result Date: 10/3/2020 EXAM:  CT HEAD WO CONT INDICATION:   speech difficulty COMPARISON: CT head 9/27/2020, MRI brain 9/27/2020. TECHNIQUE: Unenhanced CT of the head was performed using 5 mm images. Brain and bone windows were generated. CT dose reduction was achieved through use of a standardized protocol tailored for this examination and automatic exposure control for dose modulation. FINDINGS: Unchanged generalized parenchymal volume loss with commensurate dilation of the sulci and ventricular system. Unchanged periventricular and deep white matter hypodensities, consistent with chronic microangiopathic ischemic changes. Basilar cisterns are patent. No midline shift. There is no evidence of acute infarct, hemorrhage, or extraaxial fluid collection. The paranasal sinuses, mastoid air cells, and middle ears are clear. The orbital contents are within normal limits with bilateral lens implants. There are no significant osseous or extracranial soft tissue lesions. IMPRESSION: 1. No evidence of acute intracranial abnormality. 2. Unchanged generalized parenchymal volume loss and chronic microvascular ischemic disease. Xr Chest [facility] Result Date: 9/26/2020 EXAM: XR CHEST PORT INDICATION: CVA COMPARISON: 9/24/2020 FINDINGS: A portable AP radiograph of the chest was obtained at 0957 hours. There is no pneumothorax or pleural effusion. No consolidation or pulmonary edema is shown. Cardiac and mediastinal contours are stable. Internal fixation of the proximal right humerus is again shown. IMPRESSION: Stable chest x-ray appearance Xr Upper Gi W Kub/ Ba Swallow Result Date: 10/5/2020 UPPER GI SERIES: 10/5/2020 11:50 AM INDICATION: Dysphagia, nausea, vomiting. COMPARISON: CTA head and neck 9/27/2020.  TECHNIQUE: Following a  radiograph of the abdomen, thick barium and effervescent crystals were given orally and multiple radiographs and fluoroscopic store images were obtained for a double contrast esophagram and upper GI series. Thin barium was subsequently administered and a single contrast esophagram was obtained. FINDINGS: On  radiography, there is mild biapical pulmonary fibrosis, and probable trace bilateral pleural effusions. The examination is significantly limited by patient immobility and inability to drink. Effervescent crystals prompted a gurgling cough, the patient aspirated thin barium, and the examination was terminated. Limited evaluation of the esophagus and stomach reveals no gross gastroesophageal abnormality. IMPRESSION: Aspiration. Air kerma (radiation dose): 27.2 mGy Cta Code Neuro Head And Neck W Cont Result Date: 9/27/2020 INDICATION: cva EXAMINATION:  CT ANGIOGRAPHY HEAD AND NECK COMPARISON: CTA September 26, 2020 and brain MRI September 26, 2020 TECHNIQUE:  Following the uneventful administration of  intravenous contrast, axial CT angiography of the head and neck was performed. 3D image postprocessing was performed. CT dose reduction was achieved through use of a standardized protocol tailored for this examination and automatic exposure control for dose modulation. Cerebral perfusion analysis using computed tomography with contrast administration, including post processing of parametric maps with the termination of cerebral blood flow, cerebral blood volume, and mean transit time. FINDINGS: CTA NECK Aortic Arch: Patent. Right Common Carotid Artery: Moderate calcified plaque at the bifurcation without significant stenosis. Right Internal Carotid Artery: Moderate calcified plaque along the posterior wall of the internal carotid artery origin and proximal cervical segment without significant stenosis. NASCET Right: 0-25%.  Left Common Carotid Artery: Mild calcific plaque in the bifurcation without significant stenosis. Left Internal Carotid Artery: Moderate calcific plaque at the origin and proximal cervical segment without significant stenosis. NASCET Left: 0-25%. Carotid stenosis determined using NASCET criteria. Right Vertebral Artery: Patent. Left Vertebral Artery: Patent. Cervical Soft Tissues: 2.6 cm cystic nodule right thyroid lobe. Lung Apices: Small pleural effusions and mild interstitial edema partly visualized in the lung apices. Biapical pleural and parenchymal scarring. Bones: No destructive bone lesion. Additional Comments: N/A. CTA HEAD Posterior Circulation: No flow limiting stenosis or occlusion. Anterior Circulation: No flow limiting stenosis or occlusion. Additional Comments: Probable small infundibulum left supraclinoid internal carotid artery at the expected location of the posterior communicating artery, unchanged. CT PERFUSION: There are no regional areas of elevated Tmax, decreased cerebral blood flow or blood volume. RCBF < 30% = 0 cc. Tmax > 6 seconds = 0 cc. Mismatch volume = 0 cc. IMPRESSION: No acute large vessel occlusion. No change since MRI and CT examination of yesterday. Cta Code Neuro Head And Neck W Cont Result Date: 9/26/2020 INDICATION: slurred speech EXAMINATION:  CT ANGIOGRAPHY HEAD AND NECK COMPARISON: None TECHNIQUE:  Following the uneventful administration of , axial CT angiography of the head and neck was performed. Delayed axial images through the head were also obtained. Coronal and sagittal reconstructions were obtained. Manual postprocessing of images was performed. 3-D  Sagittal maximal intensity projection images were obtained. 3-D Coronal maximal intensity projections were obtained. CT dose reduction was achieved through use of a standardized protocol tailored for this examination and automatic exposure control for dose modulation.  CT brain perfusion was performed with generation of hemodynamic maps of multiple parameters, including cerebral blood flow, cerebral blood volume, and MTT (mean transit time). . FINDINGS: CTA NECK: Aortic Arch: No significant abnormality. Right Common Carotid Artery: No significant abnormality. Right Internal Carotid Artery: No significant abnormality. NASCET Right: 0-25%. Left Common Carotid Artery: No significant abnormality. Left Internal Carotid Artery: No significant abnormality. NASCET Left: 0-25%. . Carotid stenosis determined using NASCET criteria. Right Vertebral Artery: No significant abnormality. Left Vertebral Artery: No significant abnormality. Cervical Soft Tissues: Large hypodensity in the right thyroid gland. . Lung Apices: Biapical pleural thickening. Bones: Multilevel degenerative changes in cervical spine. No destructive bony lesions. . Additional Comments: N/A. CTA HEAD: Posterior Circulation: There is mild to moderate stenosis of the right P1 segment. No sizable posterior communicating arteries are present. . Anterior Circulation: No flow limiting stenosis or occlusion. Additional Comments: No evidence of aneurysm or vascular malformation. Mild to moderate chronic small vessel ischemic disease. There are no regional areas of elevated Tmax, decreased cerebral blood flow or blood volume. rCBF < 30% = 0 cc. Tmax > 6 seconds = 0 cc. Impression: 1. No acute process. No arterial thrombosis/occlusion, dissection, or flow-limiting stenosis. 2.  No definite perfusion abnormalities are identified. 3.  Mild to moderate chronic small vessel ischemic disease. 4.  Large hypodensity in the right thyroid gland which may represent a cyst. Consider nonemergent ultrasound 5. Degenerative changes in cervical spine Ct Code Neuro Head Wo Contrast 
 
Result Date: 9/27/2020 EXAM: CT CODE NEURO HEAD WO CONTRAST INDICATION: Code Stroke slurred speech  onset weakness COMPARISON: September 26. CONTRAST: None. TECHNIQUE: Unenhanced CT of the head was performed using 5 mm images. Brain and bone windows were generated. Coronal and sagittal reformats. CT dose reduction was achieved through use of a standardized protocol tailored for this examination and automatic exposure control for dose modulation. FINDINGS: There is atrophy and nonspecific white matter changes stable. There is no extra-axial fluid collection hemorrhage shift or masses. IMPRESSION: No acute changes. Ct Code Neuro Head Wo Contrast 
 
Result Date: 9/26/2020 EXAM: CT CODE NEURO HEAD WO CONTRAST INDICATION: slurred speech COMPARISON: 8/31/2020. CONTRAST: None. TECHNIQUE: Unenhanced CT of the head was performed using 5 mm images. Brain and bone windows were generated. Coronal and sagittal reformats. CT dose reduction was achieved through use of a standardized protocol tailored for this examination and automatic exposure control for dose modulation. FINDINGS: The ventricles and sulci are normal in size, shape and configuration. . Mild subcortical deep white matter hypodensities. . There is no intracranial hemorrhage, extra-axial collection, or mass effect. The basilar cisterns are open. No CT evidence of acute infarct. The bone windows demonstrate no abnormalities. The visualized portions of the paranasal sinuses and mastoid air cells are clear. IMPRESSION: Mild chronic small vessel ischemic disease. No acute intracranial abnormality. Findings were discussed with emergency department at the time of dictation. Ct Code Neuro Perf W Cbf Result Date: 9/27/2020 INDICATION: cva EXAMINATION:  CT ANGIOGRAPHY HEAD AND NECK COMPARISON: CTA September 26, 2020 and brain MRI September 26, 2020 TECHNIQUE:  Following the uneventful administration of  intravenous contrast, axial CT angiography of the head and neck was performed. 3D image postprocessing was performed.   CT dose reduction was achieved through use of a standardized protocol tailored for this examination and automatic exposure control for dose modulation. Cerebral perfusion analysis using computed tomography with contrast administration, including post processing of parametric maps with the termination of cerebral blood flow, cerebral blood volume, and mean transit time. FINDINGS: CTA NECK Aortic Arch: Patent. Right Common Carotid Artery: Moderate calcified plaque at the bifurcation without significant stenosis. Right Internal Carotid Artery: Moderate calcified plaque along the posterior wall of the internal carotid artery origin and proximal cervical segment without significant stenosis. NASCET Right: 0-25%. Left Common Carotid Artery: Mild calcific plaque in the bifurcation without significant stenosis. Left Internal Carotid Artery: Moderate calcific plaque at the origin and proximal cervical segment without significant stenosis. NASCET Left: 0-25%. Carotid stenosis determined using NASCET criteria. Right Vertebral Artery: Patent. Left Vertebral Artery: Patent. Cervical Soft Tissues: 2.6 cm cystic nodule right thyroid lobe. Lung Apices: Small pleural effusions and mild interstitial edema partly visualized in the lung apices. Biapical pleural and parenchymal scarring. Bones: No destructive bone lesion. Additional Comments: N/A. CTA HEAD Posterior Circulation: No flow limiting stenosis or occlusion. Anterior Circulation: No flow limiting stenosis or occlusion. Additional Comments: Probable small infundibulum left supraclinoid internal carotid artery at the expected location of the posterior communicating artery, unchanged. CT PERFUSION: There are no regional areas of elevated Tmax, decreased cerebral blood flow or blood volume. RCBF < 30% = 0 cc. Tmax > 6 seconds = 0 cc. Mismatch volume = 0 cc. IMPRESSION: No acute large vessel occlusion. No change since MRI and CT examination of yesterday. Ct Code Neuro Perf W Cbf Result Date: 9/26/2020 INDICATION: slurred speech EXAMINATION:  CT ANGIOGRAPHY HEAD AND NECK COMPARISON: None TECHNIQUE:  Following the uneventful administration of , axial CT angiography of the head and neck was performed. Delayed axial images through the head were also obtained. Coronal and sagittal reconstructions were obtained. Manual postprocessing of images was performed. 3-D  Sagittal maximal intensity projection images were obtained. 3-D Coronal maximal intensity projections were obtained. CT dose reduction was achieved through use of a standardized protocol tailored for this examination and automatic exposure control for dose modulation. CT brain perfusion was performed with generation of hemodynamic maps of multiple parameters, including cerebral blood flow, cerebral blood volume, and MTT (mean transit time). . FINDINGS: CTA NECK: Aortic Arch: No significant abnormality. Right Common Carotid Artery: No significant abnormality. Right Internal Carotid Artery: No significant abnormality. NASCET Right: 0-25%. Left Common Carotid Artery: No significant abnormality. Left Internal Carotid Artery: No significant abnormality. NASCET Left: 0-25%. . Carotid stenosis determined using NASCET criteria. Right Vertebral Artery: No significant abnormality. Left Vertebral Artery: No significant abnormality. Cervical Soft Tissues: Large hypodensity in the right thyroid gland. . Lung Apices: Biapical pleural thickening. Bones: Multilevel degenerative changes in cervical spine. No destructive bony lesions. . Additional Comments: N/A. CTA HEAD: Posterior Circulation: There is mild to moderate stenosis of the right P1 segment. No sizable posterior communicating arteries are present. . Anterior Circulation: No flow limiting stenosis or occlusion. Additional Comments: No evidence of aneurysm or vascular malformation. Mild to moderate chronic small vessel ischemic disease.  There are no regional areas of elevated Tmax, decreased cerebral blood flow or blood volume. rCBF < 30% = 0 cc. Tmax > 6 seconds = 0 cc. Impression: 1. No acute process. No arterial thrombosis/occlusion, dissection, or flow-limiting stenosis. 2.  No definite perfusion abnormalities are identified. 3.  Mild to moderate chronic small vessel ischemic disease. 4.  Large hypodensity in the right thyroid gland which may represent a cyst. Consider nonemergent ultrasound 5. Degenerative changes in cervical spine Mri Code Neuro Brain Wo Cont Result Date: 9/27/2020 INDICATION:   cva EXAMINATION:  MRI BRAIN WO CONTRAST COMPARISON:  CT earlier today TECHNIQUE:  Multiplanar multisequence acquisition without contrast of the brain. FINDINGS:  Ventricles:  Midline, no hydrocephalus. Brain Parenchyma/Brainstem:  Mild chronic white matter disease throughout the supratentorial brain. Mild generalized atrophy. No acute infarction. Intracranial Hemorrhage:  None. Basal Cisterns:  Normal. Flow Voids:  Normal. Additional Comments:  N/A. IMPRESSION: Chronic white matter disease with no acute infarction. PAST PSYCHIATRIC HISTORY:  The patient denies any prior history of psychiatric treatment, inpatient hospitalizations, or suicide attempts. There is no prior history of substance abuse. PSYCHOSOCIAL HISTORY:  The patient is currently living with family and anticipates moving in permanently with her son. As noted above, she had been living in a rehab since 07/2020 and had just moved in with her son-in-law for a few days. Her spouse passed away 10 years ago which was somewhat of a stress for her, but feels she has adjusted to that. Family appears to be very supportive. Denies any major stressors otherwise. MENTAL STATUS EXAMINATION:  The patient is an elderly  female who is dressed in hospital apparel. She was calm and cooperative during the interview. Makes good eye contact.   Speech is spontaneous and coherent. Mood is reported as being blah and affect is depressed. Denies any active suicidal ideation or plan. Denies any perceptual abnormalities. Denies any delusions. Her thought process is logical and goal-directed. Cognitively, she is awake and alert, partly oriented to time, but could not remember what month of the year it is. She was able to name the current president, but could not recall any of the previous presidents. Struggled somewhat with delayed recall and was unable to recall what she had for lunch. Intelligence is average. Fund of knowledge is average. Insight is partial.  Judgment is fair. 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 I will start her on 5 mg of Lexapro in the morning and also augmented with 7.5 mg of Remeron due to her poor sleep and lack of an appetite. We discussed the delayed onset of therapeutic benefit from antidepressants, and I educated her granddaughter about the need for continued medication. At the present time, there do not appear to be any issues related to safety and send her home once she is medically stable. Thank you for your consult. Bhupinder Jacobo MD 
 
 
[id number] 
D:  10/11/2020 14:47 T:  10/11/2020 17:18 
JOB #:  [id number]

## 2020-10-11 NOTE — PROGRESS NOTES
Per Infectious disease, patient to be weaned off O2 as he does not have this at home. I have lowered him to 1 L. His SpO2 is 93 after 15 mins.

## 2020-10-11 NOTE — PROGRESS NOTES
[facility] [facility] Adult  Hospitalist Group Hospitalist Progress Note Dm Pena MD 
Answering service: [phone] OR [phone] from in house phone Date of Service:  10/11/2020 NAME:  Alicia Klein :  5/15/1927 MRN:  [id number] Admission Summary:  
 
Alicia Klein is a 80 y.o. female who presents with slurred speech and weakness. Patient was seen in ER one day ago for stroke symptoms and worked up including had an MRI which was negative for acute stroke. She was discharged home and she presents back with persistent slurred speech and weakness. There was no report of facial asymmetry or droop, focal hand or leg weakness. There is more like generalized weakness. While in the ER blood sugar per EMS was 177. Vital signs in ED showed temperature 98.3 pulse rate of 100 respiratory 23 pressure 114/71 O2 sats 97% on room air When she was in the ED, EKG showed A. fib rate controlled She had a CT scan code neuro which was negative acute stroke or large vessel occlusion She was deemed not a tpa candidate per tele neuro. An MRI of the brain has been ordered and was pending The patient was in the room with her son. She feels like nothing is wrong with her. She had a recent right shoulder fracture for which she had been to rehab. She reports that the right shoulder still hurtful and also she is having nausea associated with this. Interval history / Subjective:  
 
Patient seen and examined this morning. Daughter in law at bedside. Patient looks more pale and weak today. Worse progression in a daily bases. Per daughter in law, she has some bites of food and an ensure. Not sure when she has her last BM. Assessment & Plan: # Debility # Acute metabolic encephalopathy/ hospital delirium - Discussed with son, no prior hx of dementia.  But given patient's decline physically, language and loss of appetite, this could be end stage dementia - Palliative care if family agrees  
- ?? Depression ( psychiatry consulted per family request) # Dysphagia - s/p EGD showing <1cm segment of Manzanares's appearing mucosa. Cold biopsies taken otherwise remainder of the esophagus appears normal, stomach small hiatal hernia  
- on puree diet, tolerating well but not interested to eat - speech following, plan to further advance diet as tolerated - Per report D/w Son - they are not interested in feeding tubes. # Slurred speech and generalized weakness possible due to TIA vs UTI 
- has slurred speech, possible due to dry tongue from dehydratin 
- continue on Eliquis ( now on hold will resume on Sunday), aspirin and pravachol 
- CT head no acute change - 9/26 and 9/27 
- CTA head/CT perfusion no acute large vessel occlusion  - 9/26 and 9/27 
- MRI chronic white matter disease with no acute infarction. - 9/26 and 9/27 
- lipid panel is normal 
- troponin <0.05 
- continue neuro checks 
- consult to speech therapist  
- Seen by Neurology - thinks from UTI and not realy a true seizure or CVA. - Continue to monitor 
- neurology re eval after recurrence of slurred speech - doesn't think stroke. # Thyroid cyst and abnormal thyroid function 
- 2.6 cm cystic nodule right thyroid lobe on CTA head, not sure clinical significance -TSH elevated, free T4 normal, patient on amiodarone  
- repeat thyroid function in 4 weeks # Dehydration due to poor oral intake 
- encourage po intake, switch to D5W-1/2NS 
- ct with multivitamin  
- monitor electrolytes - Per report D/w Son - they are not interested in feeding tubes. # UTI (POA) 
- urine cx showing pseudomonas - switch to zosyn - completed on 10/7 
- Recheck Culture per family request 10/4 - so far ngtd # Chronic A Fib, rate controlled 
- continue on amiodarone, metoprolol and eliquis ( will re-initiate on Sunday) -10/3, EKG checked which showed A. Fib. # Hyperglycemia on admission 
- A1c 5.6 
- no further intervention # Hx of right humerus fracture s/p ORIF - poa 
- has right shoulder pain 
- continue prn tylenol # Hypokalemia  
- replete as needed COVID 19 test on 9/29 negative Code status: DNR 
DVT prophylaxis: Eliquis Care Plan discussed with: Patient/Family, Nurse and  Anticipated Disposition: SNF Anticipated Discharge: D/w Son Vanessa  Hospital Problems  Never Reviewed Codes Class Noted POA Goals of care, counseling/discussion ICD-10-CM: Z71.89 ICD-9-CM: V65.49  Unknown Unknown Failure to thrive in adult ICD-10-CM: R62.7 ICD-9-CM: 783.7  Unknown Unknown Feeding difficulties ICD-10-CM: R63.3 ICD-9-CM: 620. 3  Unknown Unknown Impaired mobility ICD-10-CM: Z74.09 
ICD-9-CM: 799.89  Unknown Unknown UTI (urinary tract infection) ICD-10-CM: N39.0 ICD-9-CM: 599.0  9/29/2020 Unknown  
   
 TIA (transient ischemic attack) ICD-10-CM: G45.9 ICD-9-CM: 435.9  9/27/2020 Unknown Vital Signs:  
 Last 24hrs VS reviewed since prior progress note. Most recent are: 
Visit Vitals /84 Pulse (!) 102 Temp 98 °F (36.7 °C) Resp 18 Ht 5' 2\" (1.575 m) Wt 59 kg (130 lb) SpO2 93% Breastfeeding No  
BMI 23.78 kg/m² No intake or output data in the 24 hours ending 10/11/20 1308 Physical Examination:  
     
Gen:  No distress, awake but oriented X 1, pale looking HEENT:  Normal cephalic atraumatic Neck:  Supple, No JVD Lungs:  Clear bilaterally, no wheeze, no rales, normal effort Heart:  Regular Rate and Rhythm, normal S1 and S2, no edema Abdomen:  Soft, non tender, normal bowel sounds, no guarding. Extremities:  Right shoulder non tender, stiches in place Neurological:  Slurred speech, follows simple command Skin:  No rashes or moles Data Review:  
I personally reviewed labs and imaging Labs:  
 
No results for input(s): WBC, HGB, HCT, PLT, HGBEXT, HCTEXT, PLTEXT, HGBEXT, HCTEXT, PLTEXT in the last 72 hours. No results for input(s): NA, K, CL, CO2, BUN, CREA, GLU, CA, MG, PHOS, URICA in the last 72 hours. No results for input(s): ALT, AP, TBIL, TBILI, TP, ALB, GLOB, GGT, AML, LPSE in the last 72 hours. No lab exists for component: SGOT, GPT, AMYP, HLPSE No results for input(s): INR, PTP, APTT, INREXT, INREXT in the last 72 hours. No results for input(s): FE, TIBC, PSAT, FERR in the last 72 hours. No results found for: FOL, RBCF No results for input(s): PH, PCO2, PO2 in the last 72 hours. No results for input(s): CPK, CKNDX, TROIQ in the last 72 hours. No lab exists for component: CPKMB Lab Results Component Value Date/Time Cholesterol, total 123 09/28/2020 04:42 AM  
 HDL Cholesterol 53 09/28/2020 04:42 AM  
 LDL, calculated 55.4 09/28/2020 04:42 AM  
 Triglyceride 73 09/28/2020 04:42 AM  
 CHOL/HDL Ratio 2.3 09/28/2020 04:42 AM  
 
Lab Results Component Value Date/Time Glucose (POC) 111 (H) 10/11/2020 06:16 AM  
 Glucose (POC) 143 (H) 10/10/2020 11:32 PM  
 Glucose (POC) 110 (H) 10/10/2020 04:22 PM  
 Glucose (POC) 99 10/10/2020 05:40 AM  
 Glucose (POC) 122 (H) 10/09/2020 11:55 PM  
 
Lab Results Component Value Date/Time  Color YELLOW/STRAW 10/03/2020 04:18 PM  
 Appearance CLEAR 10/03/2020 04:18 PM  
 Specific gravity 1.013 10/03/2020 04:18 PM  
 Specific gravity <1.005 09/27/2020 11:10 PM  
 pH (UA) 6.5 10/03/2020 04:18 PM  
 Protein Negative 10/03/2020 04:18 PM  
 Glucose Negative 10/03/2020 04:18 PM  
 Ketone Negative 10/03/2020 04:18 PM  
 Bilirubin Negative 10/03/2020 04:18 PM  
 Urobilinogen 0.2 10/03/2020 04:18 PM  
 Nitrites Negative 10/03/2020 04:18 PM  
 Leukocyte Esterase LARGE (A) 10/03/2020 04:18 PM  
 Epithelial cells FEW 10/03/2020 04:18 PM  
 Bacteria Negative 10/03/2020 04:18 PM  
 WBC 20-50 10/03/2020 04:18 PM  
 RBC 0-5 10/03/2020 04:18 PM  
 
 Medications Reviewed:  
 
Current Facility-Administered Medications Medication Dose Route Frequency  dextrose 5 % - 0.45% NaCl infusion  50 mL/hr IntraVENous CONTINUOUS  
 sodium chloride (NS) flush 5-40 mL  5-40 mL IntraVENous Q8H  
 sodium chloride (NS) flush 5-40 mL  5-40 mL IntraVENous PRN  
 miconazole (MICOTIN) 2 % powder   Topical Q12H  
 melatonin tablet 3 mg  3 mg Oral QHS PRN  
 [Held by provider] mirtazapine (REMERON) tablet 7.5 mg  7.5 mg Oral QHS  lactobac ac& pc-s.therm-b.anim (ISABELLE Q/RISAQUAD)  1 Cap Oral DAILY  ondansetron (ZOFRAN) injection 4 mg  4 mg IntraVENous Q6H PRN  
 nystatin (MYCOSTATIN) 100,000 unit/mL oral suspension 500,000 Units  500,000 Units Swish and Swallow QID  therapeutic multivitamin (THERAGRAN) tablet 1 Tab  1 Tab Oral DAILY  polyethylene glycol (MIRALAX) packet 17 g  17 g Oral DAILY  docusate sodium (COLACE) capsule 100 mg  100 mg Oral BID PRN  
 albuterol (PROVENTIL VENTOLIN) nebulizer solution 2.5 mg  2.5 mg Nebulization TID PRN  
 amiodarone (CORDARONE) tablet 200 mg  200 mg Oral DAILY  apixaban (ELIQUIS) tablet 2.5 mg  2.5 mg Oral BID  latanoprost (XALATAN) 0.005 % ophthalmic solution 1 Drop  1 Drop Both Eyes QHS  metoprolol tartrate (LOPRESSOR) tablet 50 mg  50 mg Oral QPM  
 pravastatin (PRAVACHOL) tablet 10 mg  10 mg Oral QHS  timolol (TIMOPTIC) 0.5 % ophthalmic solution 1 Drop  1 Drop Both Eyes BID  acetaminophen (TYLENOL) tablet 650 mg  650 mg Oral Q4H PRN  
 aspirin chewable tablet 81 mg  81 mg Oral DAILY  labetaloL (NORMODYNE;TRANDATE) injection 5 mg  5 mg IntraVENous Q10MIN PRN  
 
______________________________________________________________________ EXPECTED LENGTH OF STAY: 2d 0h 
ACTUAL LENGTH OF STAY:          15 
 
            
Dm Pena MD

## 2020-10-12 NOTE — PROGRESS NOTES
Comprehensive Nutrition Assessment Reason for Visit: Gaurang Carvalho Nutrition Recommendations/Plan: 1. Continue palliative involvement for goals. Son (not Imtiaz Sorto) at bedside continues to ask about palliative care. Pt is unable to maintain nutrition with PO diet alone. PEG not recommended given advanced age. Per MD note on 10/6, family is not interested in feeding tube. 2. Consider alternative appetite stimulant. Remeron was previously d/c'd this admission d/t somnolence and now resumed by psych. Perhaps something like Megace would be better option. 3. Consider d/cing q 6 hour BG checks. Pt is not requiring any coverage. On D5. 4. Continue PO diet as tolerated with ONS for ease/concentration of nutrition. Discussed ways to maximize caloric intake. 5. May be worth checking B vitamin panel, most notably B12 to check for any deficiencies that could be neurological symptoms 6. Pt meets criteria for acute, severe protein-calorie malnutrition Malnutrition Assessment: 
Malnutrition Status:  Severe malnutrition Context:  Acute illness Findings of the 6 clinical characteristics of malnutrition:  
Energy Intake:  7 - 75% or less est energy requirements for 1 month or longer Weight Loss:  Unable to assess Body Fat Loss:  7 - Severe body fat loss, Orbital  
Muscle Mass Loss:  7 - Severe muscle mass loss, Hand (interosseous), Temples (temporalis) Fluid Accumulation:  1 - Mild, Extremities  Strength:  Not performed Nutrition Assessment:    
80 y.o. female who has a past medical history of CAD (coronary artery disease) and Gastrointestinal disorder. Admitted with TIA (transient ischemic attack) [G45.9] UTI (urinary tract infection) [N39.0] Late entry - Pt seen 10/12 for follow-up. Very sleepy, did not participate in conversation. Son Benji Swift at bedside. Reports pt took about 50% of her broth today for lunch.   He brought her a cinnamon bun from [employer] as this used to be her favorite, but she had no interest.  Has consumed about 1.5 Ensure Compact so far. Otherwise, nutrition intake remains limited and she in unlikely to maintain nutrition status from PO diet alone. She is now on D5. Palliative care consult from last week reviewed. Plan was potential family meeting last Friday, but does not appear that this happened. In fact, Audra Salgado states he has not heard anything about a palliative care meeting. Pt had EGD on 10/8 which showed <1cm segment of Manzanares's appearing mucosa. MBS completed. Pureed recommended with advancement to regular diet as tolerated. Pt is tolerating pureed, but has no interest.  Seen by psych. Started on Lexapro and Remeron resumed. However, should note that Remeron was discontinued previously d/t somnolence. Wt up 8 lb from admission, although ? accuracy since bed weights. Noted concern for possible heart failure. Plan echo. Also, son raised concern about pt's BG reading of 157 earlier. Explained pt is on D5, HbA1C normal on admission (checked d/t elevated BG on admission). Started on D5 d/t BG of 84 last week. However, unclear if POC BG checks are still needed at this point as long as she is on D5. Not requiring correctional scale. Wt Readings from Last 20 Encounters:  
10/06/20 58.9 kg (130 lb) - bed  
09/27/20 55.7 kg (122 lb 12.7 oz) 09/26/20 55.7 kg (122 lb 12.7 oz) 09/24/20 56.7 kg (125 lb)  
09/23/20 56.7 kg (125 lb) Nutritionally Significant Medications:  
D5-1/2NS @ 35 mL/hr, FloraQ, Nystatin (swish and swallow), Miralax, Pravastatin, MVI, Remeron Estimated Daily Nutrient Needs: 
Energy (kcal):  8251-6606(25-30 kcal/kg) Protein (g):  70(1.2 gm/kg) Fluid (ml/day):  1500(older adult) Nutrition Related Findings:   
Edema: LLE- trace, RUE- 2+ pitting, RLE-1+ pitting Last BM: 10/13 - soft Wounds:   
None Current Nutrition Therapies: DIET NUTRITIONAL SUPPLEMENTS All Meals; Ensure Compact (4 oz) DIET ONE TIME MESSAGE 
DIET DYSPHAGIA PUREED (NDD1) DIET ONE TIME MESSAGE Meal intake:  
Patient Vitals for the past 168 hrs: 
 % Diet Eaten 10/09/20 1813 50 % 10/09/20 1226 10 % 10/07/20 1330 0 % 10/07/20 0839 0 % 10/07/20 0828 5 % 10/06/20 1813 10 % 10/06/20 1215 5 % Anthropometric Measures: 
· Height:  5' 2\" (157.5 cm) · Current Body Wt:  59 kg (130 lb 1.1 oz) · Admission Body Wt:  122 lb 12.7 oz(55.7 kg) · Usual Body Wt:  125 lb · Ideal Body Wt:    lb:  111.6 % · BMI Categories:  Underweight (BMI less than 22) age over 72   - suspect actual wt to be lower than current listed wt Nutrition Diagnosis: · Severe malnutrition related to inadequate protein-energy intake(advanced age; swallowing difficulty) as evidenced by severe loss of subcutaneous fat, severe muscle loss, intake 0-25% Nutrition Interventions:  
Food and/or Nutrient Delivery: Continue current diet, Continue oral nutrition supplement Nutrition Education and Counseling: Counseling initiated(high kcal/ high pro tips) Coordination of Nutrition Care: Continued inpatient monitoring Goals: 
PO improvements to >/=25% of meals with 1-2 ONS daily within 3-4 days; decisions re: goals of care Nutrition Monitoring and Evaluation:  
Behavioral-Environmental Outcomes: (-) Food/Nutrient Intake Outcomes: Food and nutrient intake, Supplement intake Physical Signs/Symptoms Outcomes: Biochemical data, Chewing or swallowing, GI status, Meal time behavior, Nutrition focused physical findings, Skin, Weight Discharge Planning:   
Continue oral nutrition supplement Recent Labs 10/12/20 
1028 * BUN 11  
CREA 0.78   
K 3.3*  
 CO2 27  
CA 8.1* No results for input(s): ALT, AP, TBIL, TBILI, TP, ALB, GLOB, GGT, AML, LPSE in the last 72 hours. No lab exists for component: SGOT, GPT, AMYP, HLPSE No results for input(s): LAC in the last 72 hours. Recent Labs 10/12/20 
0301 WBC 10.6 HGB 14.8 HCT 45.3  No results for input(s): PREALB in the last 72 hours. No results for input(s): TRIGL in the last 72 hours. Recent Labs 10/13/20 
4855 10/12/20 
2347 10/12/20 
1218 10/12/20 
4872 10/12/20 
0026 10/11/20 
1612 10/11/20 
1380 10/10/20 
2332 10/10/20 
1622 GLUCPOC 105* 131* 157* 107* 108* 100 111* 143* 110* Lab Results Component Value Date/Time Hemoglobin A1c 5.6 09/27/2020 05:22 PM  
 
 
Iron Profile No results found for: IRON, TIBC, PSAT No results found for: FERR B Vitamins No results found for: FOL, B12LT, VB6LT, VIB1LT Zinc 
No results found for: [facility] [employer] No results found for: COSLT Marvin Nath RD Available via [employer] Or Pager# [phone]

## 2020-10-12 NOTE — PROGRESS NOTES
Problem: Mobility Impaired (Adult and Pediatric) Goal: *Acute Goals and Plan of Care (Insert Text) Description: FUNCTIONAL STATUS PRIOR TO ADMISSION: Patient was independent and active without the use of DME prior to her shoulder injury (R humeral fx w/ ORIF) about a month ago. Since that time, the patient has relied on a manual wheelchair pushed by caregivers for ambulation. She is able to walk short distances with considerable assistance from caregivers. She was not able to sit unsupported. HOME SUPPORT PRIOR TO ADMISSION: The patient lived alone with no support required prior to her shoulder injury. She discharged from [facility] to her son and daughter in [address] [place] [address]. Caregivers assist with adls and mobility. Patient is open to home health for PT/ OT. Physical Therapy Goals Initiated 9/28/2020 1. Patient will move from supine to sit and sit to supine , scoot up and down, and roll side to side in bed with moderate assistance  within 7 day(s). 2.  Patient will transfer from bed to chair and chair to bed with moderate assistance  using the least restrictive device within 7 day(s). 3.  Patient will perform sit to stand with moderate assistance  within 7 day(s). 4.  Patient will ambulate with moderate assistance  for 25 feet with the least restrictive device within 7 day(s). Outcome: Progressing Towards Goal 
 PHYSICAL THERAPY TREATMENT: WEEKLY REASSESSMENT Patient: Valentino Ohs (25 y.o. female) Date: 10/12/2020 Primary Diagnosis: TIA (transient ischemic attack) [G45.9] UTI (urinary tract infection) [N39.0] Procedure(s) (LRB): ESOPHAGOGASTRODUODENOSCOPY (EGD)   :- (N/A) ESOPHAGOGASTRODUODENAL (EGD) BIOPSY (N/A) 4 Days Post-Op Precautions:   Fall(RUE sling s/p ORIF approx 4 wks ago) ASSESSMENT Patient continues with skilled PT services and is progressing towards goals.  Patient more alert today and participating more in upright mobility. She continues with significant trunk weakness and poor sitting balance. More engaged in mobility and transfers. Still far below her baseline and constant reports of being tired and weak. Supportive family desiring to take her home with home health. Patient's progression toward goals since last assessment: minimal progress but today with improved transfer ability; goals modified Current Level of Function Impacting Discharge (mobility/balance): max assist; poor sitting balance Functional Outcome Measure: The patient scored 0/56 on the Philippe outcome measure which is indicative of high fall risk. Other factors to consider for discharge: supportive family wanting to take her home with 24/7 care and additional caregivers as needed. PLAN : 
Goals have been updated based on progression since last assessment. Patient continues to benefit from skilled intervention to address the above impairments. Recommendations and Planned Interventions: bed mobility training, transfer training, therapeutic exercises, patient and family training/education, and therapeutic activities Frequency/Duration: Patient will be followed by physical therapy:  5 times a week to address goals. Recommendation for discharge: (in order for the patient to meet his/her long term goals) Physical therapy at least 2 days/week in the home AND ensure assist and/or supervision for safety with mobility This discharge recommendation: 
Has been made in collaboration with the attending provider and/or case management IF patient discharges home will need the following DME: hospital bed (?), mechanical lift, and sliding/transfer board SUBJECTIVE:  
Patient stated I am so tired.  OBJECTIVE DATA SUMMARY:  
HISTORY:   
Past Medical History:  
Diagnosis Date CAD (coronary artery disease) Gastrointestinal disorder History reviewed. No pertinent surgical history. Personal factors and/or comorbidities impacting plan of care: minimal oral  intake Home Situation Home Environment: Private residence # Steps to Enter: 5 Rails to Enter: Yes One/Two Story Residence: Two story, live on 1st floor Living Alone: No 
Support Systems: Family member(s), Home care staff Patient Expects to be Discharged to[de-identified] Private residence Current DME Used/Available at Home: [employer]) Tub or Shower Type: Shower EXAMINATION/PRESENTATION/DECISION MAKING:  
Critical Behavior: 
Neurologic State: Alert Orientation Level: Oriented to person, Oriented to place Cognition: Appropriate for age attention/concentration Safety/Judgement: Decreased awareness of environment Hearing: Auditory Auditory Impairment: None Functional Mobility: 
Bed Mobility: 
  
Supine to Sit: Moderate assistance Scooting: Maximum assistance Transfers: 
Sit to Stand: Maximum assistance Stand to Sit: Maximum assistance Bed to Chair: Maximum assistance Balance:  
Sitting: Impaired; Without support Sitting - Static: Poor (constant support) Sitting - Dynamic: Poor (constant support) Standing: Impaired; With support Standing - Static: Poor Standing - Dynamic : Poor Therapeutic Exercises:  
Heel slides; seated knee extension Functional Measure: 
Darby Arun Balance Test: 
 
Sitting to Standin Standing Unsupported: 0 Sitting with Back Unsupported: 0 Standing to Sittin Transfers: 0 Standing Unsupported with Eyes Closed: 0 Standing Unsupported with Feet Together: 0 Reach Forward with Outstretched Arm: 0  Object: 0 Turn to Look Over Shoulders: 0 Turn 360 Degrees: 0 Alternate Foot on Step/Stool: 0 Standing Unsupported One Foot in Front: 0 Stand on One Le Total: 0/56 
  
 
 
56=Maximum possible score;  
0-20=High fall risk 21-40=Moderate fall risk 41-56=Low fall risk After treatment patient left in no apparent distress: Sitting in chair, Call bell within reach, and Caregiver / family present COMMUNICATION/EDUCATION:  
The patients plan of care was discussed with: Registered nurse. Patient/family agree to work toward stated goals and plan of care. Thank you for this referral. 
Gem Andre, PT Time Calculation: 23 mins

## 2020-10-12 NOTE — PROGRESS NOTES
Problem: Self Care Deficits Care Plan (Adult) Goal: *Acute Goals and Plan of Care (Insert Text) Description:  
FUNCTIONAL STATUS PRIOR TO ADMISSION: Patient was independent with ADLs. Discharged from [facility] 9/22/2020 to [facility] and [facility] s/p fall and R humeral fx with ORIF. The patient was functional at the wheelchair level and HHA for sit<>stand and functional mobility. Patient required moderate assistance for basic and total assist instrumental ADLs. Was about to have aid setup and HHOT when this episode occurred. HOME SUPPORT: The patient lived alone. However, then discharged to [facility]. Occupational Therapy Goals Goals reviewed and continued: 10/12/2020 Initiated 9/28/2020 1. Patient will perform self-feeding with supervision/set-up within 7 day(s). 2.  Patient will perform grooming with supervision/set-up within 7 day(s). 3.  Patient will perform sitting unsupported ADLs 4 mins with minimal assistance/contact guard assist within 7 day(s). 4.  Patient will perform BSC toilet transfers with moderate assistance  within 7 day(s). 5.  Patient will perform all aspects of toileting with moderate assistance  within 7 day(s). 6.  Patient will perform upper body dressing with max A within 7 days. Outcome: Progressing Towards Goal 
 OCCUPATIONAL THERAPY TREATMENT/WEEKLY RE-ASSESSMENT Patient: Otto Car (74 y.o. female) Date: 10/12/2020 Diagnosis: TIA (transient ischemic attack) [G45.9] UTI (urinary tract infection) [N39.0] <principal problem not specified> Procedure(s) (LRB): ESOPHAGOGASTRODUODENOSCOPY (EGD)   :- (N/A) ESOPHAGOGASTRODUODENAL (EGD) BIOPSY (N/A) 4 Days Post-Op Precautions: Fall(RUE sling s/p ORIF approx 4 wks ago) Chart, occupational therapy assessment, plan of care, and goals were reviewed. ASSESSMENT Patient continues with skilled OT services and is progressing towards goals. 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 Son present. Patient and son instructed in AROM exercises for right elbow, forearm, wrist, and fingers to maintain mobility and increase strength and functional use. Patient issued resistive sponge for bilateral hand strengthening and demonstrates understanding of use. Patient reports being fearful to use right hand to bring cup with straw to her mouth secondary to possible spillage. Assisted patient in use of cup and straw with right hand and CGA of cup to demonstrate use of right UE without mobilizing right shoulder. Recommended son bring in a handled, light weight cup with a lid and ability to use with a straw to increase patient's ability to manage a drink on her own. Current Level of Function Impacting Discharge (ADLs): Mod to total assist for self care, limited endurance, pacing Other factors to consider for discharge: family wishes to take patient home PLAN : 
Goals have been updated based on progression since last assessment. Patient continues to benefit from skilled intervention to address the above impairments. Continue to follow patient 3 times a week to address goals. Recommend with staff: Janeece Drilling in chair for meals and self care at least once a day with max assist versus Jeffery lift, encouragement to participate in self care and self feeding as able including use of right UE at elbow and hand (not shoulder). Recommend next OT session: UE bathing Recommendation for discharge: (in order for the patient to meet his/her long term goals) Per family's request: 
Occupational therapy at least 2 days/week in the home AND ensure assist and/or supervision for safety with self care and mobility This discharge recommendation: 
Has been made in collaboration with the attending provider and/or case management IF patient discharges home will need the following DME: bedside commode, gait belt, hospital bed, walker: rolling, and wheelchair SUBJECTIVE:  
Patient stated I don't want the bed to get all wet.  OBJECTIVE DATA SUMMARY:  
Cognitive/Behavioral Status: 
Neurologic State: Alert Orientation Level: Oriented to person;Oriented to place; Disoriented to time;Disoriented to situation Cognition: Follows commands;Decreased attention/concentration;Memory loss Perception: Appears intact Perseveration: No perseveration noted Safety/Judgement: Decreased awareness of environment Functional Mobility and Transfers for ADLs: 
Bed Mobility: 
Supine to Sit: Moderate assistance Scooting: Maximum assistance Transfers: 
Sit to Stand: Maximum assistance Bed to Chair: Maximum assistance Balance: 
Sitting: Impaired; Without support Sitting - Static: Poor (constant support) Sitting - Dynamic: Poor (constant support) Standing: Impaired; With support Standing - Static: Poor Standing - Dynamic : Poor ADL Intervention: 
Feeding Drink to Mouth: Contact guard assistance(of cup to reassure patient from spillage) Cues: Verbal cues provided; Tactile cues provided;Physical assistance Adaptive Equipment: Cup with lid and handle; Other (comment)(recommended to son with use of straw) Grooming Washing Face: Set-up(left hand) Lower Body Dressing Assistance Dressing Assistance: Total assistance(dependent)(inferred) Cognitive Retraining Organizing/Sequencing: Breaking task down Attention to Task: Distractibility; Single task Following Commands: Follows one step commands/directions Safety/Judgement: Decreased awareness of environment Cues: Tactile cues provided;Verbal cues provided;Visual cues provided Therapeutic Exercises: Right UE. EXERCISE Sets Reps Active Active Assist  
 Passive Comments Shoulder Flexion   []               []               []                 
Shoulder Abduction   []               []               []                 
Forearm supination/pronation 1 10 [x]               []               []                 
Elbow flexion/extension 1 10 [x]               []               []                 
Wrist flexion/extension 1 10 [x]               []               []                 
Finger flexion/extension 1 10 [x]               []               []                 
 
Issued resistive (green) sponge for use with bilateral hands for strengthening. Demonstrates use bilaterally with right hand strength < to left. Activity Tolerance:  
Fair Please refer to the flowsheet for vital signs taken during this treatment. After treatment patient left in no apparent distress:  
Heels elevated for pressure relief, Call bell within reach, Bed / chair alarm activated, and Caregiver / family present (son) COMMUNICATION/COLLABORATION:  
The patients plan of care was discussed with: Occupational therapist and Registered nurse. Hilda S [facility] Time Calculation: 26 mins

## 2020-10-12 NOTE — PROGRESS NOTES
[address] [facility] Adult  Hospitalist Group Hospitalist Progress Note Brenda Mas MD 
Answering service: [phone] OR [phone] from in house phone Date of Service:  10/12/2020 NAME:  Mary Franks :  5/15/1927 MRN:  [id number] Admission Summary:  
 
Mary Franks is a 80 y.o. female who presents with slurred speech and weakness. Patient was seen in ER one day ago for stroke symptoms and worked up including had an MRI which was negative for acute stroke. She was discharged home and she presents back with persistent slurred speech and weakness. There was no report of facial asymmetry or droop, focal hand or leg weakness. There is more like generalized weakness. While in the ER blood sugar per EMS was 177. Vital signs in ED showed temperature 98.3 pulse rate of 100 respiratory 23 pressure 114/71 O2 sats 97% on room air When she was in the ED, EKG showed A. fib rate controlled She had a CT scan code neuro which was negative acute stroke or large vessel occlusion She was deemed not a tpa candidate per tele neuro. An MRI of the brain has been ordered and was pending The patient was in the room with her son. She feels like nothing is wrong with her. She had a recent right shoulder fracture for which she had been to rehab. She reports that the right shoulder still hurtful and also she is having nausea associated with this. Interval history / Subjective:  
 
Patient seen, very sleepy. No event reported. No family member at bedside Assessment & Plan: # Elevated Pro-BNP, possible heart failure Significant >8K, with normal renal function - check Echo # Debility # Acute metabolic encephalopathy/ hospital delirium - Discussed with son, no prior hx of dementia. But given patient's decline physically, language and loss of appetite, this could be end stage dementia - Palliative care if family agrees  
- ?? Depression ( psychiatry consulted per family request) # Dysphagia - s/p EGD showing <1cm segment of Manzanares's appearing mucosa. Cold biopsies taken otherwise remainder of the esophagus appears normal, stomach small hiatal hernia  
- on puree diet, tolerating well but not interested to eat - speech following, plan to further advance diet as tolerated - Per report D/w Son - they are not interested in feeding tubes. # Slurred speech and generalized weakness possible due to TIA vs UTI 
- has slurred speech, possible due to dry tongue from dehydratin 
- continue on Eliquis ( now on hold will resume on Sunday), aspirin and pravachol 
- CT head no acute change - 9/26 and 9/27 
- CTA head/CT perfusion no acute large vessel occlusion  - 9/26 and 9/27 
- MRI chronic white matter disease with no acute infarction. - 9/26 and 9/27 
- lipid panel is normal 
- troponin <0.05 
- continue neuro checks 
- consult to speech therapist  
- Seen by Neurology - thinks from UTI and not realy a true seizure or CVA. - Continue to monitor 
- neurology re eval after recurrence of slurred speech - doesn't think stroke. # Thyroid cyst and abnormal thyroid function 
- 2.6 cm cystic nodule right thyroid lobe on CTA head, not sure clinical significance -TSH elevated, free T4 normal, patient on amiodarone  
- repeat thyroid function in 4 weeks # Dehydration due to poor oral intake 
- encourage po intake, switch to D5W-1/2NS 
- ct with multivitamin  
- monitor electrolytes - Per report D/w Son - they are not interested in feeding tubes. # UTI (POA) 
- urine cx showing pseudomonas - switch to zosyn - completed on 10/7 
- Recheck Culture per family request 10/4 - so far ngtd # Chronic A Fib, rate controlled 
- continue on amiodarone, metoprolol and eliquis ( will re-initiate on Sunday) 
-10/3, EKG checked which showed A. Fib. # Hyperglycemia on admission 
- A1c 5.6 - no further intervention # Hx of right humerus fracture s/p ORIF - poa 
- has right shoulder pain 
- continue prn tylenol # Hypokalemia  
- replete as needed COVID 19 test on 9/29 negative Code status: DNR 
DVT prophylaxis: Eliquis Care Plan discussed with: Patient/Family, Nurse and  Anticipated Disposition: SNF Anticipated Discharge: D/w Son Vanessa  Hospital Problems  Never Reviewed Codes Class Noted POA Goals of care, counseling/discussion ICD-10-CM: Z71.89 ICD-9-CM: V65.49  Unknown Unknown Failure to thrive in adult ICD-10-CM: R62.7 ICD-9-CM: 783.7  Unknown Unknown Feeding difficulties ICD-10-CM: R63.3 ICD-9-CM: 533. 3  Unknown Unknown Impaired mobility ICD-10-CM: Z74.09 
ICD-9-CM: 799.89  Unknown Unknown UTI (urinary tract infection) ICD-10-CM: N39.0 ICD-9-CM: 599.0  9/29/2020 Unknown  
   
 TIA (transient ischemic attack) ICD-10-CM: G45.9 ICD-9-CM: 435.9  9/27/2020 Unknown Vital Signs:  
 Last 24hrs VS reviewed since prior progress note. Most recent are: 
Visit Vitals /84 (BP 1 Location: Left arm, BP Patient Position: At rest) Pulse 83 Temp 97.6 °F (36.4 °C) Resp 19 Ht 5' 2\" (1.575 m) Wt 59 kg (130 lb) SpO2 97% Breastfeeding No  
BMI 23.78 kg/m² No intake or output data in the 24 hours ending 10/12/20 8197 Physical Examination:  
     
Gen:  No distress, awake but oriented X 1, pale looking HEENT:  Normal cephalic atraumatic Neck:  Supple, No JVD Lungs:  Clear bilaterally, no wheeze, no rales, normal effort Heart:  Regular Rate and Rhythm, normal S1 and S2, no edema Abdomen:  Soft, non tender, normal bowel sounds, no guarding. Extremities:  Right shoulder non tender, stiches in place Neurological:  Slurred speech, follows simple command Skin:  No rashes or moles Data Review:  
I personally reviewed labs and imaging Labs:  
 
Recent Labs 10/12/20 
0301 WBC 10.6 HGB 14.8 HCT 45.3  Recent Labs 10/12/20 
1028   
K 3.3*  
 CO2 27 BUN 11  
CREA 0.78 * CA 8.1* No results for input(s): ALT, AP, TBIL, TBILI, TP, ALB, GLOB, GGT, AML, LPSE in the last 72 hours. No lab exists for component: SGOT, GPT, AMYP, HLPSE No results for input(s): INR, PTP, APTT, INREXT, INREXT in the last 72 hours. No results for input(s): FE, TIBC, PSAT, FERR in the last 72 hours. No results found for: FOL, RBCF No results for input(s): PH, PCO2, PO2 in the last 72 hours. No results for input(s): CPK, CKNDX, TROIQ in the last 72 hours. No lab exists for component: CPKMB Lab Results Component Value Date/Time Cholesterol, total 123 09/28/2020 04:42 AM  
 HDL Cholesterol 53 09/28/2020 04:42 AM  
 LDL, calculated 55.4 09/28/2020 04:42 AM  
 Triglyceride 73 09/28/2020 04:42 AM  
 CHOL/HDL Ratio 2.3 09/28/2020 04:42 AM  
 
Lab Results Component Value Date/Time Glucose (POC) 157 (H) 10/12/2020 12:18 PM  
 Glucose (POC) 107 (H) 10/12/2020 06:24 AM  
 Glucose (POC) 108 (H) 10/12/2020 12:26 AM  
 Glucose (POC) 100 10/11/2020 04:12 PM  
 Glucose (POC) 111 (H) 10/11/2020 06:16 AM  
 
Lab Results Component Value Date/Time Color YELLOW/STRAW 10/03/2020 04:18 PM  
 Appearance CLEAR 10/03/2020 04:18 PM  
 Specific gravity 1.013 10/03/2020 04:18 PM  
 Specific gravity <1.005 09/27/2020 11:10 PM  
 pH (UA) 6.5 10/03/2020 04:18 PM  
 Protein Negative 10/03/2020 04:18 PM  
 Glucose Negative 10/03/2020 04:18 PM  
 Ketone Negative 10/03/2020 04:18 PM  
 Bilirubin Negative 10/03/2020 04:18 PM  
 Urobilinogen 0.2 10/03/2020 04:18 PM  
 Nitrites Negative 10/03/2020 04:18 PM  
 Leukocyte Esterase LARGE (A) 10/03/2020 04:18 PM  
 Epithelial cells FEW 10/03/2020 04:18 PM  
 Bacteria Negative 10/03/2020 04:18 PM  
 WBC 20-50 10/03/2020 04:18 PM  
 RBC 0-5 10/03/2020 04:18 PM  
 
 
 
Medications Reviewed: Current Facility-Administered Medications Medication Dose Route Frequency  escitalopram oxalate (LEXAPRO) tablet 5 mg  5 mg Oral DAILY  dextrose 5 % - 0.45% NaCl infusion  50 mL/hr IntraVENous CONTINUOUS  
 sodium chloride (NS) flush 5-40 mL  5-40 mL IntraVENous Q8H  
 sodium chloride (NS) flush 5-40 mL  5-40 mL IntraVENous PRN  
 miconazole (MICOTIN) 2 % powder   Topical Q12H  
 melatonin tablet 3 mg  3 mg Oral QHS PRN  
 [Held by provider] mirtazapine (REMERON) tablet 7.5 mg  7.5 mg Oral QHS  lactobac ac& pc-s.therm-b.anim (ISABELLE Q/RISAQUAD)  1 Cap Oral DAILY  ondansetron (ZOFRAN) injection 4 mg  4 mg IntraVENous Q6H PRN  
 nystatin (MYCOSTATIN) 100,000 unit/mL oral suspension 500,000 Units  500,000 Units Swish and Swallow QID  therapeutic multivitamin (THERAGRAN) tablet 1 Tab  1 Tab Oral DAILY  polyethylene glycol (MIRALAX) packet 17 g  17 g Oral DAILY  docusate sodium (COLACE) capsule 100 mg  100 mg Oral BID PRN  
 albuterol (PROVENTIL VENTOLIN) nebulizer solution 2.5 mg  2.5 mg Nebulization TID PRN  
 amiodarone (CORDARONE) tablet 200 mg  200 mg Oral DAILY  apixaban (ELIQUIS) tablet 2.5 mg  2.5 mg Oral BID  latanoprost (XALATAN) 0.005 % ophthalmic solution 1 Drop  1 Drop Both Eyes QHS  metoprolol tartrate (LOPRESSOR) tablet 50 mg  50 mg Oral QPM  
 pravastatin (PRAVACHOL) tablet 10 mg  10 mg Oral QHS  timolol (TIMOPTIC) 0.5 % ophthalmic solution 1 Drop  1 Drop Both Eyes BID  acetaminophen (TYLENOL) tablet 650 mg  650 mg Oral Q4H PRN  
 aspirin chewable tablet 81 mg  81 mg Oral DAILY  labetaloL (NORMODYNE;TRANDATE) injection 5 mg  5 mg IntraVENous Q10MIN PRN  
 
______________________________________________________________________ EXPECTED LENGTH OF STAY: 2d 0h 
ACTUAL LENGTH OF STAY:          15 
 
            
María Moore MD

## 2020-10-12 NOTE — PROGRESS NOTES
Physical Therapy Returned to assist patient back to bed. Requiring max assist of one for stand pivot. Family present. Will continue to follow.  
Isaiah Sanders, PT

## 2020-10-13 NOTE — PROGRESS NOTES
Problem: Dysphagia (Adult) Goal: *Acute Goals and Plan of Care (Insert Text) Description: Speech therapy goals Initiated 10/5/2020 1. Patient will participate in re-evaluation of swallow function within 7 days MET New goal 10/9/2020  2. Patient will participate in [facility] for further evaluation of swallow physiology within 7 days. MET New goal 10/9/2020  3. Patient will tolerate pureed diet, thin liquids without overt s/s aspiration within 7 days. New goal 10/13/20 1. Patient will tolerate dental soft diet, thin liquids free of s/s of aspiration within 7 days. Outcome: Progressing Towards Goal 
 SPEECH LANGUAGE PATHOLOGY DYSPHAGIA TREATMENT Patient: Laura Madrigal (37 y.o. female) Date: 10/13/2020 Diagnosis: TIA (transient ischemic attack) [G45.9] UTI (urinary tract infection) [N39.0] <principal problem not specified> Procedure(s) (LRB): ESOPHAGOGASTRODUODENOSCOPY (EGD)   :- (N/A) ESOPHAGOGASTRODUODENAL (EGD) BIOPSY (N/A) 5 Days Post-Op Precautions:   Fall(RUE sling s/p ORIF approx 4 wks ago) ASSESSMENT: 
Patient continues with decreased PO intake. Per RN, patient tolerated cinnamon roll brought in by family, but coughed with applesauce. Possibly related to taste rather than an aspiration event. Patient was not interested in PO intake during today's visit. Spoke at length with patient and her sons Franklyn Rodriges via phone, other son in person regarding diet options in light of her level of functioning. She currently reports \"nothing goes down. \" Upon further questioning, she feels as if her mouth is so dry, she is unable to chew and propel food posteriorly. Nothing dissolves. Discussed strategies for managing this, (sip liquid prior to taking a bite, over the counter oral moisturizers, choosing foods that are naturally more moist and less sticky).  She reports the cinnamon roll was too hard to eat, but agreed that naturally moist foods such as soup, pasta with sauce, or chicken salad may work better. She further reports that food doesn't taste good, it is possible her dry mouth is playing a roll in this altered sensation. She and her sons agreed to trial soft diet with thin liquids, using these strategies. Will advance diet and continue to follow for tolerance. PLAN: 
Recommendations and Planned Interventions: 
Dental soft diet, extra sauce or gravy with all meals. Sip liquids prior to eating Patient continues to benefit from skilled intervention to address the above impairments. Continue treatment per established plan of care. Discharge Recommendations: To Be Determined SUBJECTIVE:  
Patient stated It just won't go down. OBJECTIVE:  
Cognitive and Communication Status: 
Neurologic State: Alert Orientation Level: Oriented to person, Oriented to place, Oriented to time, Disoriented to situation Cognition: Follows commands Perception: Appears intact Perseveration: No perseveration noted Safety/Judgement: Decreased awareness of environment Dysphagia Treatment: 
Education as noted above After treatment:  
Patient left in no apparent distress in bed, Call bell within reach, and Nursing notified COMMUNICATION/EDUCATION:  
Patient was educated as noted above. Patient and family verbalized understanding. Family members further verbalized appropriate food items within the guidelines discussed. The patient's plan of care including recommendations, planned interventions, and recommended diet changes were discussed with: Registered nurse. Jero Chawla, [facility] Time Calculation: 30 mins

## 2020-10-13 NOTE — PROGRESS NOTES
Palliative Medicine Consult [place]: [phone] Patient Name: Alicia Klein YOB: 1927 Date of Initial Consult: 10/6/2020 Reason for Consult: Care decisions Requesting Provider: Dr. Itzel eGorge Primary Care Physician: Shena Carrillo MD 
 
 SUMMARY:  
Alicia Klein is a 80 y.o. female with a past history of chronic atrial fib, CAD, and a recent right humerus fracture s/p ORIF, who was admitted on 9/27/2020 from home with a diagnosis of slurred speech concerning for TIA/CVA. She had multiple episodes of slurred speech at home and presented to the ED. She was admitted for further management. MRI of the brain showed chronic white matter disease with no acute infarction. Speech therapy was consulted and was concerned about the possibility of esophageal dysphagia with obstruction. GI was consulted and an EGD is planned for 10/8/20 to further evaluate. Current medical issues leading to Palliative Medicine involvement include: dysphagia, need to discuss care decisions. Social: She has three sons who are very involved in her care. PALLIATIVE DIAGNOSES:  
1. Goals of care 2. Failure to thrive 3. Feeding difficulties 4. Anorexia 5. Dysphagia 6. Impaired mobility 7. Depression PLAN:  
1. Medical record reviewed with events noted since initial consult. EGD did not show an esophageal mass or obstruction. She continues to have a poor appetite. She has been seen by psych and diagnosed with depression. She was started on Lexapro and Remeron. 2. I had a long discussion at the bedside with patient Gwen Mason and son Eddie Smith. We talked about the last few months and all the changes that have occurred. She was previously living independently and was highly functional. Due to her fall and shoulder injury, she is now having to live with her son Roberth Garrison and is struggling with the loss of her independence. We talked about how difficult this is. 3. We talked about the trauma of what she went through when she had her fall and the 9+ hours she spent on the floor waiting to be found. We talked about how difficult an event like that is to go through, followed by having to go through surgery, and then rehab. 
4. We talked further about her poor appetite. She understands that if she does not eat, she will continue to get weaker, and could be at risk of dying. She says she wants to eat, but is not hungry and does not want to force the food down. She is hoping for improvement in her appetite and is willing to try medication to stimulate this. We discussed seeing if the Remeron helps. If not, we could try Megace or Marinol. 5. During our conversation she does state, \"I want to live\". We talked about taking things day by day and setting small goals for what she will try to do and eat each day. She is open to talking further and making plans for her daily goals. 6. She has been through a lot in the last few months. I suspect that she is depressed after going through the trauma of her fall, surgery, recovery/rehab, and loss of independence. Hopefully the Lexapro and Remeron combined with counseling and family support will help her to feel better soon. 7. Will continue to follow up with patient and family for further support 8. Communicated plan of care with: Dennis Glass 192 Team 
 
 GOALS OF CARE / TREATMENT PREFERENCES:  
 
GOALS OF CARE: 
Patient/Health Care Proxy Stated Goals: Prolong life TREATMENT PREFERENCES:  
Code Status: DNR Advance Care Planning: 
[x] [facility] Interdisciplinary Team has updated the ACP Navigator with Jun and Patient Capacity Primary Decision Maker: Emma Shukla - Son - [phone] Secondary Decision Maker: Matthew Quiroga - Son - [phone] Advance Care Planning 9/28/2020 Patient's Healthcare Decision Maker is: Named in scanned ACP document Confirm Advance Directive Yes, on file Medical Interventions: Limited additional interventions Other Instructions:  
Artificially Administered Nutrition: No feeding tube Other: As far as possible, the palliative care team has discussed with patient / health care proxy about goals of care / treatment preferences for patient. HISTORY:  
 
History obtained from: chart, son CHIEF COMPLAINT:  
 
HPI/SUBJECTIVE: The patient is:  
[x] Verbal and participatory [] Non-participatory due to: She denies any pain, nausea, or shortness of breath at this time. She does report a very poor appetite. Clinical Pain Assessment (nonverbal scale for severity on nonverbal patients):  
Clinical Pain Assessment Severity: 0 Activity (Movement): Lying quietly, normal position Duration: for how long has pt been experiencing pain (e.g., 2 days, 1 month, years) Frequency: how often pain is an issue (e.g., several times per day, once every few days, constant) FUNCTIONAL ASSESSMENT:  
 
Palliative Performance Scale (PPS): PPS: 50 PSYCHOSOCIAL/SPIRITUAL SCREENING:  
 
Palliative IDT has assessed this patient for cultural preferences / practices and a referral made as appropriate to needs (Cultural Services, Patient Advocacy, Ethics, etc.) Any spiritual / Rastafari concerns: 
[] Yes /  [x] No 
 
Caregiver Burnout: 
[] Yes /  [x] No /  [] No Caregiver Present Anticipatory grief assessment:  
[x] Normal  / [] Maladaptive ESAS Anxiety: Anxiety: 0 
 
ESAS Depression: Depression: 4 REVIEW OF SYSTEMS:  
 
Positive and pertinent negative findings in ROS are noted above in HPI. The following systems were [x] reviewed / [] unable to be reviewed as noted in HPI Other findings are noted below.  
Systems: constitutional, ears/nose/mouth/throat, respiratory, gastrointestinal, genitourinary, musculoskeletal, integumentary, neurologic, psychiatric, endocrine. Positive findings noted below. Modified ESAS Completed by: provider Fatigue: 8 Drowsiness: 1 Depression: 4 Pain: 0 Anxiety: 0 Nausea: 0 Anorexia: 8 Dyspnea: 0 Stool Occurrence(s): 1 PHYSICAL EXAM:  
 
From RN flowsheet: 
Wt Readings from Last 3 Encounters:  
10/06/20 130 lb (59 kg) 09/26/20 122 lb 12.7 oz (55.7 kg) 09/24/20 125 lb (56.7 kg) Blood pressure (!) 143/94, pulse 100, temperature 97.6 °F (36.4 °C), resp. rate 18, height 5' 2\" (1.575 m), weight 130 lb (59 kg), SpO2 95 %, not currently breastfeeding. Pain Scale 1: Numeric (0 - 10) Pain Intensity 1: 0 Pain Onset 1: acute Pain Location 1: Head 
Pain Orientation 1: Anterior Pain Description 1: Aching Pain Intervention(s) 1: Rest 
Last bowel movement, if known:  
 
Constitutional: alert, awake, elderly, no acute distress Eyes: pupils equal, anicteric ENMT: no nasal discharge, moist mucous membranes Respiratory: breathing not labored, symmetric Gastrointestinal: soft non-tender Musculoskeletal: no deformity, no tenderness to palpation Skin: warm, dry Neurologic: following commands, moving all extremities Psychiatric: depressed HISTORY:  
 
Active Problems: 
  TIA (transient ischemic attack) (9/27/2020) UTI (urinary tract infection) (9/29/2020) Goals of care, counseling/discussion () Failure to thrive in adult () Feeding difficulties () Impaired mobility () Past Medical History:  
Diagnosis Date  CAD (coronary artery disease)  Gastrointestinal disorder History reviewed. No pertinent surgical history. History reviewed. No pertinent family history. History reviewed, no pertinent family history. Social History Tobacco Use  Smoking status: Never Smoker  Smokeless tobacco: Never Used Substance Use Topics  Alcohol use: Not Currently Allergies Allergen Reactions  Ambien [Zolpidem] Unknown (comments)  Fosamax [Alendronate] Unknown (comments) Current Facility-Administered Medications Medication Dose Route Frequency  escitalopram oxalate (LEXAPRO) tablet 5 mg  5 mg Oral DAILY  dextrose 5 % - 0.45% NaCl infusion  35 mL/hr IntraVENous CONTINUOUS  
 sodium chloride (NS) flush 5-40 mL  5-40 mL IntraVENous Q8H  
 sodium chloride (NS) flush 5-40 mL  5-40 mL IntraVENous PRN  
 miconazole (MICOTIN) 2 % powder   Topical Q12H  
 melatonin tablet 3 mg  3 mg Oral QHS PRN  mirtazapine (REMERON) tablet 7.5 mg  7.5 mg Oral QHS  lactobac ac& pc-s.therm-b.anim (ISABELLE Q/RISAQUAD)  1 Cap Oral DAILY  ondansetron (ZOFRAN) injection 4 mg  4 mg IntraVENous Q6H PRN  
 nystatin (MYCOSTATIN) 100,000 unit/mL oral suspension 500,000 Units  500,000 Units Swish and Swallow QID  therapeutic multivitamin (THERAGRAN) tablet 1 Tab  1 Tab Oral DAILY  polyethylene glycol (MIRALAX) packet 17 g  17 g Oral DAILY  docusate sodium (COLACE) capsule 100 mg  100 mg Oral BID PRN  
 albuterol (PROVENTIL VENTOLIN) nebulizer solution 2.5 mg  2.5 mg Nebulization TID PRN  
 amiodarone (CORDARONE) tablet 200 mg  200 mg Oral DAILY  apixaban (ELIQUIS) tablet 2.5 mg  2.5 mg Oral BID  latanoprost (XALATAN) 0.005 % ophthalmic solution 1 Drop  1 Drop Both Eyes QHS  metoprolol tartrate (LOPRESSOR) tablet 50 mg  50 mg Oral QPM  
 pravastatin (PRAVACHOL) tablet 10 mg  10 mg Oral QHS  timolol (TIMOPTIC) 0.5 % ophthalmic solution 1 Drop  1 Drop Both Eyes BID  acetaminophen (TYLENOL) tablet 650 mg  650 mg Oral Q4H PRN  
 aspirin chewable tablet 81 mg  81 mg Oral DAILY  labetaloL (NORMODYNE;TRANDATE) injection 5 mg  5 mg IntraVENous Q10MIN PRN  
 
 
 
 LAB AND IMAGING FINDINGS:  
 
Lab Results Component Value Date/Time WBC 10.6 10/12/2020 03:01 AM  
 HGB 14.8 10/12/2020 03:01 AM  
 PLATELET 480 75/82/4946 03:01 AM  
 
Lab Results Component Value Date/Time  Sodium 137 10/12/2020 10:28 AM  
 Potassium 3.3 (L) 10/12/2020 10:28 AM  
 Chloride 104 10/12/2020 10:28 AM  
 CO2 27 10/12/2020 10:28 AM  
 BUN 11 10/12/2020 10:28 AM  
 Creatinine 0.78 10/12/2020 10:28 AM  
 Calcium 8.1 (L) 10/12/2020 10:28 AM  
 Magnesium 1.8 10/06/2020 01:00 AM  
  
Lab Results Component Value Date/Time Alk. phosphatase 88 10/01/2020 01:13 AM  
 Protein, total 5.8 (L) 10/01/2020 01:13 AM  
 Albumin 2.4 (L) 10/01/2020 01:13 AM  
 Globulin 3.4 10/01/2020 01:13 AM  
 
Lab Results Component Value Date/Time INR 1.1 09/27/2020 05:22 PM  
 Prothrombin time 11.5 (H) 09/27/2020 05:22 PM  
  
No results found for: IRON, FE, TIBC, IBCT, PSAT, FERR No results found for: PH, PCO2, PO2 No components found for: [place] No results found for: CPK, CKMB Total time: 35 min Counseling / coordination time, spent as noted above: 30 min 
> 50% counseling / coordination?: yes Prolonged service was provided for  []30 min   []75 min in face to face time in the presence of the patient, spent as noted above. Time Start:  
Time End:  
Note: this can only be billed with 52797 (initial) or 95153 (follow up). If multiple start / stop times, list each separately.

## 2020-10-13 NOTE — PROGRESS NOTES
[facility]  Hospitalist Group Hospitalist Progress Note Dennis Weinstein MD 
Answering service: [phone] OR [phone] from in house phone Date of Service:  10/13/2020 NAME:  Aure Forbes :  5/15/1927 MRN:  [id number] Admission Summary:  
 
Aure Forbes is a 80 y.o. female who presents with slurred speech and weakness. Patient was seen in ER one day ago for stroke symptoms and worked up including had an MRI which was negative for acute stroke. She was discharged home and she presents back with persistent slurred speech and weakness. There was no report of facial asymmetry or droop, focal hand or leg weakness. There is more like generalized weakness. While in the ER blood sugar per EMS was 177. Vital signs in ED showed temperature 98.3 pulse rate of 100 respiratory 23 pressure 114/71 O2 sats 97% on room air When she was in the ED, EKG showed A. fib rate controlled She had a CT scan code neuro which was negative acute stroke or large vessel occlusion She was deemed not a tpa candidate per tele neuro. An MRI of the brain has been ordered and was pending The patient was in the room with her son. She feels like nothing is wrong with her. She had a recent right shoulder fracture for which she had been to rehab. She reports that the right shoulder still hurtful and also she is having nausea associated with this. Interval history / Subjective:  
 
Patient seen and examined this afternoon. Patient seems more wake than yesterday or last few days. \"I'm still weak and don't have interest to eat, I don't even get hungry\". Assessment & Plan: # Elevated Pro-BNP, possible heart failure Significant >8K, with normal renal function. Difficult to assess symptoms. No edema  
- Echo pending # Debility # Acute metabolic encephalopathy/ hospital delirium # Depression - Discussed with son, no prior hx of dementia. But given patient's decline physically, language and loss of appetite, and seeing the generalized parenchymal volume loss and chronic microvascular ischemic disease, wouldn't be surprised with  
- Palliative following - Lluvia psychiatry input, started on Lexapro and Remeron # Dysphagia - s/p EGD showing <1cm segment of Manzanares's appearing mucosa. Cold biopsies taken otherwise remainder of the esophagus appears normal, stomach small hiatal hernia  
- on puree diet, tolerating well but not interested to eat - speech following, plan to further advance diet as tolerated - Per report D/w Son - they are not interested in feeding tubes. # Slurred speech and generalized weakness possible due to TIA vs UTI 
- has slurred speech, possible due to dry tongue from dehydratin 
- continue on Eliquis ( now on hold will resume on Sunday), aspirin and pravachol 
- CT head no acute change - 9/26 and 9/27 
- CTA head/CT perfusion no acute large vessel occlusion  - 9/26 and 9/27 
- MRI chronic white matter disease with no acute infarction. - 9/26 and 9/27 
- lipid panel is normal 
- troponin <0.05 
- continue neuro checks 
- consult to speech therapist  
- Seen by Neurology - thinks from UTI and not realy a true seizure or CVA. - Continue to monitor 
- neurology re eval after recurrence of slurred speech - doesn't think stroke. # Thyroid cyst and abnormal thyroid function 
- 2.6 cm cystic nodule right thyroid lobe on CTA head, not sure clinical significance -TSH elevated, free T4 normal, patient on amiodarone  
- repeat thyroid function in 4 weeks # Dehydration due to poor oral intake 
- encourage po intake, switch to D5W-1/2NS 
- ct with multivitamin  
- monitor electrolytes - Per report D/w Son - they are not interested in feeding tubes.   
 
# UTI (POA) 
- urine cx showing pseudomonas - switch to zosyn - completed on 10/7 
 - Recheck Culture per family request 10/4 - so far ngtd # Chronic A Fib, rate controlled 
- continue on amiodarone, metoprolol and eliquis ( will re-initiate on Sunday) 
-10/3, EKG checked which showed A. Fib. # Hyperglycemia on admission 
- A1c 5.6 
- no further intervention # Hx of right humerus fracture s/p ORIF - poa 
- has right shoulder pain 
- continue prn tylenol # Hypokalemia  
- replete as needed COVID 19 test on 9/29 negative Code status: DNR 
DVT prophylaxis: Eliquis Care Plan discussed with: Patient/Family, Nurse and  Anticipated Disposition: Home with family Anticipated Discharge: D/w Jose Mendez  Hospital Problems  Never Reviewed Codes Class Noted POA Depression, unspecified depression type ICD-10-CM: F32.9 ICD-9-CM: 311  Unknown Unknown Goals of care, counseling/discussion ICD-10-CM: Z71.89 ICD-9-CM: V65.49  Unknown Unknown Failure to thrive in adult ICD-10-CM: R62.7 ICD-9-CM: 783.7  Unknown Unknown Feeding difficulties ICD-10-CM: R63.3 ICD-9-CM: 619. 3  Unknown Unknown Impaired mobility ICD-10-CM: Z74.09 
ICD-9-CM: 799.89  Unknown Unknown UTI (urinary tract infection) ICD-10-CM: N39.0 ICD-9-CM: 599.0  9/29/2020 Unknown  
   
 TIA (transient ischemic attack) ICD-10-CM: G45.9 ICD-9-CM: 435.9  9/27/2020 Unknown Vital Signs:  
 Last 24hrs VS reviewed since prior progress note. Most recent are: 
Visit Vitals BP (!) 143/94 Pulse 100 Temp 97.6 °F (36.4 °C) Resp 18 Ht 5' 2\" (1.575 m) Wt 59 kg (130 lb) SpO2 95% Breastfeeding No  
BMI 23.78 kg/m² No intake or output data in the 24 hours ending 10/13/20 1700 Physical Examination:  
     
Gen:  No distress, awake but oriented X 1, pale looking HEENT:  Normal cephalic atraumatic Neck:  Supple, No JVD Lungs:  Clear bilaterally, no wheeze, no rales, normal effort Heart:  Regular Rate and Rhythm, normal S1 and S2, no edema Abdomen:  Soft, non tender, normal bowel sounds, no guarding. Extremities:  Right shoulder non tender, stiches in place Neurological:  Slurred speech, follows simple command Skin:  No rashes or moles Data Review:  
I personally reviewed labs and imaging Labs:  
 
Recent Labs 10/12/20 
0301 WBC 10.6 HGB 14.8 HCT 45.3  Recent Labs 10/12/20 
1028   
K 3.3*  
 CO2 27 BUN 11  
CREA 0.78 * CA 8.1* No results for input(s): ALT, AP, TBIL, TBILI, TP, ALB, GLOB, GGT, AML, LPSE in the last 72 hours. No lab exists for component: SGOT, GPT, AMYP, HLPSE No results for input(s): INR, PTP, APTT, INREXT, INREXT in the last 72 hours. No results for input(s): FE, TIBC, PSAT, FERR in the last 72 hours. No results found for: FOL, RBCF No results for input(s): PH, PCO2, PO2 in the last 72 hours. No results for input(s): CPK, CKNDX, TROIQ in the last 72 hours. No lab exists for component: CPKMB Lab Results Component Value Date/Time Cholesterol, total 123 09/28/2020 04:42 AM  
 HDL Cholesterol 53 09/28/2020 04:42 AM  
 LDL, calculated 55.4 09/28/2020 04:42 AM  
 Triglyceride 73 09/28/2020 04:42 AM  
 CHOL/HDL Ratio 2.3 09/28/2020 04:42 AM  
 
Lab Results Component Value Date/Time Glucose (POC) 166 (H) 10/13/2020 12:07 PM  
 Glucose (POC) 105 (H) 10/13/2020 07:39 AM  
 Glucose (POC) 131 (H) 10/12/2020 11:47 PM  
 Glucose (POC) 157 (H) 10/12/2020 12:18 PM  
 Glucose (POC) 107 (H) 10/12/2020 06:24 AM  
 
Lab Results Component Value Date/Time  Color YELLOW/STRAW 10/03/2020 04:18 PM  
 Appearance CLEAR 10/03/2020 04:18 PM  
 Specific gravity 1.013 10/03/2020 04:18 PM  
 Specific gravity <1.005 09/27/2020 11:10 PM  
 pH (UA) 6.5 10/03/2020 04:18 PM  
 Protein Negative 10/03/2020 04:18 PM  
 Glucose Negative 10/03/2020 04:18 PM  
 Ketone Negative 10/03/2020 04:18 PM  
 Bilirubin Negative 10/03/2020 04:18 PM  
 Urobilinogen 0.2 10/03/2020 04:18 PM  
 Nitrites Negative 10/03/2020 04:18 PM  
 Leukocyte Esterase LARGE (A) 10/03/2020 04:18 PM  
 Epithelial cells FEW 10/03/2020 04:18 PM  
 Bacteria Negative 10/03/2020 04:18 PM  
 WBC 20-50 10/03/2020 04:18 PM  
 RBC 0-5 10/03/2020 04:18 PM  
 
 
 
Medications Reviewed:  
 
Current Facility-Administered Medications Medication Dose Route Frequency  escitalopram oxalate (LEXAPRO) tablet 5 mg  5 mg Oral DAILY  dextrose 5 % - 0.45% NaCl infusion  35 mL/hr IntraVENous CONTINUOUS  
 sodium chloride (NS) flush 5-40 mL  5-40 mL IntraVENous Q8H  
 sodium chloride (NS) flush 5-40 mL  5-40 mL IntraVENous PRN  
 miconazole (MICOTIN) 2 % powder   Topical Q12H  
 melatonin tablet 3 mg  3 mg Oral QHS PRN  mirtazapine (REMERON) tablet 7.5 mg  7.5 mg Oral QHS  lactobac ac& pc-s.therm-b.anim (ISABELLE Q/RISAQUAD)  1 Cap Oral DAILY  ondansetron (ZOFRAN) injection 4 mg  4 mg IntraVENous Q6H PRN  
 nystatin (MYCOSTATIN) 100,000 unit/mL oral suspension 500,000 Units  500,000 Units Swish and Swallow QID  therapeutic multivitamin (THERAGRAN) tablet 1 Tab  1 Tab Oral DAILY  polyethylene glycol (MIRALAX) packet 17 g  17 g Oral DAILY  docusate sodium (COLACE) capsule 100 mg  100 mg Oral BID PRN  
 albuterol (PROVENTIL VENTOLIN) nebulizer solution 2.5 mg  2.5 mg Nebulization TID PRN  
 amiodarone (CORDARONE) tablet 200 mg  200 mg Oral DAILY  apixaban (ELIQUIS) tablet 2.5 mg  2.5 mg Oral BID  latanoprost (XALATAN) 0.005 % ophthalmic solution 1 Drop  1 Drop Both Eyes QHS  metoprolol tartrate (LOPRESSOR) tablet 50 mg  50 mg Oral QPM  
 pravastatin (PRAVACHOL) tablet 10 mg  10 mg Oral QHS  timolol (TIMOPTIC) 0.5 % ophthalmic solution 1 Drop  1 Drop Both Eyes BID  acetaminophen (TYLENOL) tablet 650 mg  650 mg Oral Q4H PRN  
 aspirin chewable tablet 81 mg  81 mg Oral DAILY  labetaloL (NORMODYNE;TRANDATE) injection 5 mg  5 mg IntraVENous Q10MIN PRN  
 
______________________________________________________________________ EXPECTED LENGTH OF STAY: 2d 0h 
ACTUAL LENGTH OF STAY:          15 
 
            
María Moore MD

## 2020-10-14 NOTE — PROGRESS NOTES
[facility] Adult  Hospitalist Group Hospitalist Progress Note William Henriquez MD 
Answering service: [phone] OR [phone] from in house phone Date of Service:  10/14/2020 NAME:  Skinny Zaman :  5/15/1927 MRN:  [id number] Admission Summary:  
 
Skinny Zaman is a 80 y.o. female who presents with slurred speech and weakness. Patient was seen in ER one day ago for stroke symptoms and worked up including had an MRI which was negative for acute stroke. She was discharged home and she presents back with persistent slurred speech and weakness. There was no report of facial asymmetry or droop, focal hand or leg weakness. There is more like generalized weakness. While in the ER blood sugar per EMS was 177. Vital signs in ED showed temperature 98.3 pulse rate of 100 respiratory 23 pressure 114/71 O2 sats 97% on room air When she was in the ED, EKG showed A. fib rate controlled She had a CT scan code neuro which was negative acute stroke or large vessel occlusion She was deemed not a tpa candidate per tele neuro. An MRI of the brain has been ordered and was pending The patient was in the room with her son. She feels like nothing is wrong with her. She had a recent right shoulder fracture for which she had been to rehab. She reports that the right shoulder still hurtful and also she is having nausea associated with this. Interval history / Subjective:  
 
Patient seen and examined this afternoon. Patient seems more wake than yesterday or last few days. \"I'm still weak and don't have interest to eat, I don't even get hungry\". Assessment & Plan: # New leukocytosis - Patient has recently completed abx, wbc was wnl until today  
- no sign of infection  
- monitor cbc # Acute on Chronic heart failure - Pro-bnp >8K, with normal renal function. Difficult to assess symptoms, possible raised JVP but no edema. Difficult to assess heart failure sx 
- Echo EF 25-30% - Appt cardiology input  
- adjust BB, if she can tolerate then can try consider diuretic # Debility # Acute metabolic encephalopathy/ hospital delirium # Depression - Discussed with son, no prior hx of dementia. But given patient's decline physically, language and loss of appetite, and seeing the generalized parenchymal volume loss and chronic microvascular ischemic disease, wouldn't be surprised with  
- Palliative following - Lluvia psychiatry input, started on Lexapro and Remeron # ? ? Dysphagia - s/p EGD showing <1cm segment of Manzanares's appearing mucosa. Cold biopsies taken otherwise remainder of the esophagus appears normal, stomach small hiatal hernia  
- on puree diet, tolerating well but not interested to eat - speech following, plan to further advance diet as tolerated - Per report D/w Son - they are not interested in feeding tubes. # Slurred speech and generalized weakness possible due to TIA vs UTI 
- has slurred speech, possible due to dry tongue from dehydratin 
- continue on Eliquis ( now on hold will resume on Sunday), aspirin and pravachol 
- CT head no acute change - 9/26 and 9/27 
- CTA head/CT perfusion no acute large vessel occlusion  - 9/26 and 9/27 
- MRI chronic white matter disease with no acute infarction. - 9/26 and 9/27 
- lipid panel is normal 
- troponin <0.05 
- continue neuro checks 
- consult to speech therapist  
- Seen by Neurology - thinks from UTI and not realy a true seizure or CVA. - Continue to monitor 
- neurology re eval after recurrence of slurred speech - doesn't think stroke. # Thyroid cyst and abnormal thyroid function 
- 2.6 cm cystic nodule right thyroid lobe on CTA head, not sure clinical significance -TSH elevated, free T4 normal, patient on amiodarone - repeat thyroid function in 4 weeks # Dehydration due to poor oral intake 
- encourage po intake, switch to D5W-1/2NS 
- ct with multivitamin  
- monitor electrolytes - Per report D/w Son - they are not interested in feeding tubes. # UTI (POA) 
- urine cx showing pseudomonas - switch to zosyn - completed on 10/7 
- Recheck Culture per family request 10/4 - so far ngtd # Chronic A Fib, rate controlled 
- continue on amiodarone, metoprolol and eliquis  
- EKG checked which showed A. Fib. 
- appt cardiology input # Hyperglycemia on admission 
- A1c 5.6 
- no further intervention # Hx of right humerus fracture s/p ORIF - poa 
- has right shoulder pain 
- continue prn tylenol # Hypokalemia  
- replete as needed COVID 19 test on 9/29 negative Code status: DNR 
DVT prophylaxis: Eliquis Care Plan discussed with: Patient/Family, Nurse and  Anticipated Disposition: Home with family Anticipated Discharge: D/w Son Vanessa  Hospital Problems  Never Reviewed Codes Class Noted POA Depression, unspecified depression type ICD-10-CM: F32.9 ICD-9-CM: 311  Unknown Unknown Goals of care, counseling/discussion ICD-10-CM: Z71.89 ICD-9-CM: V65.49  Unknown Unknown Failure to thrive in adult ICD-10-CM: R62.7 ICD-9-CM: 783.7  Unknown Unknown Feeding difficulties ICD-10-CM: R63.3 ICD-9-CM: 029. 3  Unknown Unknown Impaired mobility ICD-10-CM: Z74.09 
ICD-9-CM: 799.89  Unknown Unknown UTI (urinary tract infection) ICD-10-CM: N39.0 ICD-9-CM: 599.0  9/29/2020 Unknown  
   
 TIA (transient ischemic attack) ICD-10-CM: G45.9 ICD-9-CM: 435.9  9/27/2020 Unknown Vital Signs:  
 Last 24hrs VS reviewed since prior progress note. Most recent are: 
Visit Vitals /81 (BP 1 Location: Left arm, BP Patient Position: At rest) Pulse (!) 104 Temp 97.2 °F (36.2 °C) Resp 18 Ht 5' 2\" (1.575 m) Wt 59 kg (130 lb) SpO2 93% Breastfeeding No  
BMI 23.78 kg/m² Intake/Output Summary (Last 24 hours) at 10/14/2020 1926 Last data filed at 10/14/2020 1535 Gross per 24 hour Intake 1506 ml Output  Net 1506 ml Physical Examination:  
     
Gen:  No distress, pale looking HEENT:  Normal cephalic atraumatic Neck:  elevated JVD Lungs:  Clear bilaterally, no wheeze, no rales, normal effort Heart:  Regular Rate and Rhythm, normal S1 and S2, no edema Abdomen:  Soft, non tender, normal bowel sounds, no guarding. Extremities:  Right shoulder non tender, stiches in place Neurological:  Slurred speech, follows simple command Skin:  No rashes or moles Data Review:  
I personally reviewed labs and imaging Labs:  
 
Recent Labs 10/14/20 
0248 10/12/20 
0301 WBC 12.2* 10.6 HGB 14.6 14.8 HCT 43.9 45.3  193 Recent Labs 10/14/20 
0248 10/12/20 
1028  137  
K 4.1 3.3*  
 104 CO2 25 27 BUN 13 11 CREA 0.75 0.78 * 135* CA 8.5 8.1*  
MG 1.8  --   
PHOS 3.0  --   
 
Recent Labs 10/14/20 
0248 ALT 36  TBILI 0.8 TP 5.5* ALB 2.3*  
GLOB 3.2 No results for input(s): INR, PTP, APTT, INREXT, INREXT in the last 72 hours. No results for input(s): FE, TIBC, PSAT, FERR in the last 72 hours. No results found for: FOL, RBCF No results for input(s): PH, PCO2, PO2 in the last 72 hours. No results for input(s): CPK, CKNDX, TROIQ in the last 72 hours. No lab exists for component: CPKMB Lab Results Component Value Date/Time Cholesterol, total 123 09/28/2020 04:42 AM  
 HDL Cholesterol 53 09/28/2020 04:42 AM  
 LDL, calculated 55.4 09/28/2020 04:42 AM  
 Triglyceride 73 09/28/2020 04:42 AM  
 CHOL/HDL Ratio 2.3 09/28/2020 04:42 AM  
 
Lab Results Component Value Date/Time  Glucose (POC) 132 (H) 10/14/2020 06:26 PM  
 Glucose (POC) 128 (H) 10/14/2020 06:04 AM  
 Glucose (POC) 128 (H) 10/14/2020 02:03 AM  
 Glucose (POC) 159 (H) 10/13/2020 07:04 PM  
 Glucose (POC) 166 (H) 10/13/2020 12:07 PM  
 
Lab Results Component Value Date/Time Color YELLOW/STRAW 10/03/2020 04:18 PM  
 Appearance CLEAR 10/03/2020 04:18 PM  
 Specific gravity 1.013 10/03/2020 04:18 PM  
 Specific gravity <1.005 09/27/2020 11:10 PM  
 pH (UA) 6.5 10/03/2020 04:18 PM  
 Protein Negative 10/03/2020 04:18 PM  
 Glucose Negative 10/03/2020 04:18 PM  
 Ketone Negative 10/03/2020 04:18 PM  
 Bilirubin Negative 10/03/2020 04:18 PM  
 Urobilinogen 0.2 10/03/2020 04:18 PM  
 Nitrites Negative 10/03/2020 04:18 PM  
 Leukocyte Esterase LARGE (A) 10/03/2020 04:18 PM  
 Epithelial cells FEW 10/03/2020 04:18 PM  
 Bacteria Negative 10/03/2020 04:18 PM  
 WBC 20-50 10/03/2020 04:18 PM  
 RBC 0-5 10/03/2020 04:18 PM  
 
 
 
Medications Reviewed:  
 
Current Facility-Administered Medications Medication Dose Route Frequency  escitalopram oxalate (LEXAPRO) tablet 5 mg  5 mg Oral DAILY  sodium chloride (NS) flush 5-40 mL  5-40 mL IntraVENous Q8H  
 sodium chloride (NS) flush 5-40 mL  5-40 mL IntraVENous PRN  
 miconazole (MICOTIN) 2 % powder   Topical Q12H  
 melatonin tablet 3 mg  3 mg Oral QHS PRN  mirtazapine (REMERON) tablet 7.5 mg  7.5 mg Oral QHS  lactobac ac& pc-s.therm-b.anim (ISABELLE Q/RISAQUAD)  1 Cap Oral DAILY  ondansetron (ZOFRAN) injection 4 mg  4 mg IntraVENous Q6H PRN  
 nystatin (MYCOSTATIN) 100,000 unit/mL oral suspension 500,000 Units  500,000 Units Swish and Swallow QID  therapeutic multivitamin (THERAGRAN) tablet 1 Tab  1 Tab Oral DAILY  polyethylene glycol (MIRALAX) packet 17 g  17 g Oral DAILY  docusate sodium (COLACE) capsule 100 mg  100 mg Oral BID PRN  
 albuterol (PROVENTIL VENTOLIN) nebulizer solution 2.5 mg  2.5 mg Nebulization TID PRN  
 amiodarone (CORDARONE) tablet 200 mg  200 mg Oral DAILY  apixaban (ELIQUIS) tablet 2.5 mg  2.5 mg Oral BID  
  latanoprost (XALATAN) 0.005 % ophthalmic solution 1 Drop  1 Drop Both Eyes QHS  metoprolol tartrate (LOPRESSOR) tablet 50 mg  50 mg Oral QPM  
 pravastatin (PRAVACHOL) tablet 10 mg  10 mg Oral QHS  timolol (TIMOPTIC) 0.5 % ophthalmic solution 1 Drop  1 Drop Both Eyes BID  acetaminophen (TYLENOL) tablet 650 mg  650 mg Oral Q4H PRN  
 aspirin chewable tablet 81 mg  81 mg Oral DAILY  labetaloL (NORMODYNE;TRANDATE) injection 5 mg  5 mg IntraVENous Q10MIN PRN  
 
______________________________________________________________________ EXPECTED LENGTH OF STAY: 2d 0h 
ACTUAL LENGTH OF STAY:          13 
 
            
María Moore MD

## 2020-10-14 NOTE — CONSULTS
Cardiology Consult/Progress Note 9/27/2020  4:55 PM  
TIA (transient ischemic attack) [G45.9];UTI (urinary tract infection) [N39.0] HPI: Tony Elliott is a 80 y.o. female admitted for suspected TIA (transient ischemic attack) [G45.9];UTI (urinary tract infection) [N39.0]. She has history of cognitive dysfunction with dementia but until about a month ago was living on her own. She presented to the hospital about 3 weeks ago with suspected TIA although further work-up did not demonstrate any actual stroke. She presented again to the hospital few days ago with confusion and focal neurological deficits which were eventually felt to be from metabolic encephalopathy related to urinary tract infection. No obvious stroke was identified. She has been on blood thinners for at least 3 years although she is a poor historian. She was noted to be in atrial fibrillation both on her last visit as well as this time around but I presume this is previously known problem given that she has been on anticoagulation for 3 years. She has been requiring oxygen during this hospitalization and hence chest x-ray was performed which demonstrated possible blunting of bilateral costophrenic angles suggestive of pleural effusion. Echocardiogram was performed which demonstrated severe reduction in LV systolic function with all 4 chamber dilatation. Cardiology has been consulted to make recommendations regarding possible congestive heart failure. Investigation Telemetry: Atrial fibrillation, ECGs performed in the last 3 weeks have demonstrated heart rates in the range of 90 to about 120. ECG: ATRIAL fibrillation, initial EKG in September showed rapid ventricular rate 114 bpm most recent ECG shows heart rate between 90 to 100 bpm.  Nonspecific ST-T changes. Echocardiogram: Globally reduced LV systolic function of about 25 to 30%. All 4 chambers are dilated. Mild mitral and tricuspid regurgitation. Moderate pulmonary hypertension. Moderate size left pleural effusion noted. Assessment and PLAN 1. Heart failure with reduced ejection fraction likely secondary to 2 2. Recurrent persistent atrial fibrillation with intermittent rapid ventricular rate 3. Possible acute on chronic congestive heart failure due to systolic dysfunction 4. Chronic microvascular changes in the brain likely causing dementia 5. Dementia 6. UTI 7. Coronary disease to be ruled out 8. Manzanares's esophagus with dysphagia Ms. Shaq Velazquez presents to the hospital with suspected stroke and UTI. Her neurological deficits have mostly recovered. There was a concern regarding congestive heart failure as suggested by elevated proBNP and oxygen need. Clinically I feel that she has at least mild acute on chronic heart failure with reduced action fraction. Most likely etiology of cardiomyopathy is atrial fibrillation with rapid ventricular rates. I will recommend increasing the beta-blockers/metoprolol to a higher dose. We will also consider adding ACE inhibitor to her regimen. If in 3 to 4 months her LV systolic function does not improve and the family wants everything done we could potentially consider ischemic evaluation at that point time. Given that her JVD is elevated and she demonstrates pleural effusion and some hypoxemia, it will be reasonable to try low-dose Lasix. However the patient is not able to take anything orally due to dysphagia we may have to hold off until her intake is adequate. If in fact she had TIA, that was mostly related to her underlying atrial fibrillation. Will recommend anticoagulation to continue unless fall risk is deemed to be high enough to avoid long-term anticoagulation. Alternatively in that situation left atrial appendage occlusion can be considered. However overall she appears to be too frail with multiple comorbidities meaning that her expected life survival may be rather limited. It may be reasonable to proceed with palliative care consult. []    High complexity decision making was performed 
[]    Patient is at high-risk of decompensation with multiple organ involvement Review of Symptoms: 
Respiratory: No exertional dyspnea, orthopnea, PND, cough, hemoptysis, URI. Cardiovascular: No CP, palpitations, sweating, lightheadedness, dizziness, syncope, presyncope, lower extremity swelling. Otherwise no other pertinent positive or negative symptoms on ROS. Patient Active Problem List  
 Diagnosis Date Noted  Depression, unspecified depression type  Goals of care, counseling/discussion  Failure to thrive in adult  Feeding difficulties  Impaired mobility  UTI (urinary tract infection) 09/29/2020  TIA (transient ischemic attack) 09/27/2020 Suze Mcgraw MD 
Past Medical History:  
Diagnosis Date  CAD (coronary artery disease)  Gastrointestinal disorder History reviewed. No pertinent surgical history. Social History Socioeconomic History  Marital status:  Spouse name: Not on file  Number of children: Not on file  Years of education: Not on file  Highest education level: Not on file Tobacco Use  Smoking status: Never Smoker  Smokeless tobacco: Never Used Substance and Sexual Activity  Alcohol use: Not Currently  Drug use: Not Currently History reviewed. No pertinent family history. Current Facility-Administered Medications Medication Dose Route Frequency  escitalopram oxalate (LEXAPRO) tablet 5 mg  5 mg Oral DAILY  sodium chloride (NS) flush 5-40 mL  5-40 mL IntraVENous Q8H  
 sodium chloride (NS) flush 5-40 mL  5-40 mL IntraVENous PRN  
 miconazole (MICOTIN) 2 % powder   Topical Q12H  
 melatonin tablet 3 mg  3 mg Oral QHS PRN  
  mirtazapine (REMERON) tablet 7.5 mg  7.5 mg Oral QHS  lactobac ac& pc-s.therm-b.anim (ISABELLE Q/RISAQUAD)  1 Cap Oral DAILY  ondansetron (ZOFRAN) injection 4 mg  4 mg IntraVENous Q6H PRN  
 nystatin (MYCOSTATIN) 100,000 unit/mL oral suspension 500,000 Units  500,000 Units Swish and Swallow QID  therapeutic multivitamin (THERAGRAN) tablet 1 Tab  1 Tab Oral DAILY  polyethylene glycol (MIRALAX) packet 17 g  17 g Oral DAILY  docusate sodium (COLACE) capsule 100 mg  100 mg Oral BID PRN  
 albuterol (PROVENTIL VENTOLIN) nebulizer solution 2.5 mg  2.5 mg Nebulization TID PRN  
 amiodarone (CORDARONE) tablet 200 mg  200 mg Oral DAILY  apixaban (ELIQUIS) tablet 2.5 mg  2.5 mg Oral BID  latanoprost (XALATAN) 0.005 % ophthalmic solution 1 Drop  1 Drop Both Eyes QHS  metoprolol tartrate (LOPRESSOR) tablet 50 mg  50 mg Oral QPM  
 pravastatin (PRAVACHOL) tablet 10 mg  10 mg Oral QHS  timolol (TIMOPTIC) 0.5 % ophthalmic solution 1 Drop  1 Drop Both Eyes BID  acetaminophen (TYLENOL) tablet 650 mg  650 mg Oral Q4H PRN  
 aspirin chewable tablet 81 mg  81 mg Oral DAILY  labetaloL (NORMODYNE;TRANDATE) injection 5 mg  5 mg IntraVENous Q10MIN PRN Prior to Admission Medications Prescriptions Last Dose Informant Patient Reported? Taking?  
acetaminophen (TYLENOL) 500 mg tablet   Yes No  
Sig: Take 500 mg by mouth every eight (8) hours. albuterol (PROVENTIL HFA, VENTOLIN HFA, PROAIR HFA) 90 mcg/actuation inhaler   Yes No  
Sig: Take 1 Puff by inhalation three (3) times daily as needed for Shortness of Breath. amiodarone (CORDARONE) 200 mg tablet   Yes No  
Sig: Take 200 mg by mouth daily. apixaban (Eliquis) 2.5 mg tablet   Yes No  
Sig: Take 2.5 mg by mouth two (2) times a day. calcium carbonate (TUMS) 200 mg calcium (500 mg) chew   Yes No  
Sig: Take 1 Tab by mouth two (2) times a day.   
latanoprost (XALATAN) 0.005 % ophthalmic solution   Yes No  
 Sig: Administer 1 Drop to both eyes nightly. metoprolol tartrate (LOPRESSOR) 50 mg tablet   Yes No  
Sig: Take 50 mg by mouth every evening.  
multivitamins-minerals-lutein (MEN'S CENTRUM SILVER WITH LUTEIN) tab tablet   Yes No  
Sig: Take 1 Tab by mouth daily. pravastatin (PRAVACHOL) 10 mg tablet   Yes No  
Sig: Take 10 mg by mouth nightly. timolol (TIMOPTIC) 0.5 % ophthalmic solution   Yes No  
Sig: Administer 1 Drop to both eyes two (2) times a day. Facility-Administered Medications: None Allergies Allergen Reactions  Ambien [Zolpidem] Unknown (comments)  Fosamax [Alendronate] Unknown (comments) Labs:  
Recent Results (from the past 24 hour(s)) GLUCOSE, POC Collection Time: 10/13/20 12:07 PM  
Result Value Ref Range Glucose (POC) 166 (H) 65 - 100 mg/dL Performed by [facility] ECHO ADULT COMPLETE Collection Time: 10/13/20  4:21 PM  
Result Value Ref Range IVSd 1.00 (A) 0.6 - 0.9 cm LVIDd 3.90 3.9 - 5.3 cm LVIDs 3.05 cm  
 LVPWd 0.92 (A) 0.6 - 0.9 cm  
 LVOT Peak Gradient 0.90 mmHg LVOT Peak Velocity 47.54 cm/s RVIDd 3.87 cm Left Atrium Major Axis 4.61 cm  
 LA Area 4C 31.38 cm2 LA Vol 4C 117.20 (A) 22 - 52 mL Left Atrium to Aortic Root Ratio 1.66 Left Atrium to Aortic Root Ratio 1.66 Aortic Regurgitant Pressure Half-time 0.71 s  
 AR Max Rodrick 375.42 cm/s Tapse 1.13 (A) 1.5 - 2.0 cm Triscuspid Valve Regurgitation Peak Gradient 50.71 mmHg Triscuspid Valve Regurgitation Peak Gradient 48.31 mmHg Triscuspid Valve Regurgitation Peak Gradient 38.80 mmHg Triscuspid Valve Regurgitation Peak Gradient 50.71 mmHg  
 TR Max Velocity 356.07 cm/s  
 TR Max Velocity 347.51 cm/s  
 TR Max Velocity 311.44 cm/s  
 TR Max Velocity 356.07 cm/s  
 LV Mass .3 67 - 162 g  
 LV Mass AL Index 72.4 43 - 95 g/m2 Left Atrium Minor Axis 2.90 cm LA Vol Index 73.63 16 - 28 ml/m2 GLUCOSE, POC  Collection Time: 10/13/20  7:04 PM  
 Result Value Ref Range Glucose (POC) 159 (H) 65 - 100 mg/dL Performed by Jose Jones GLUCOSE, POC Collection Time: 10/14/20  2:03 AM  
Result Value Ref Range Glucose (POC) 128 (H) 65 - 100 mg/dL Performed by Susie Barfield CBC WITH AUTOMATED DIFF Collection Time: 10/14/20  2:48 AM  
Result Value Ref Range WBC 12.2 (H) 3.6 - 11.0 K/uL  
 RBC 4.35 3.80 - 5.20 M/uL  
 HGB 14.6 11.5 - 16.0 g/dL HCT 43.9 35.0 - 47.0 % .9 (H) 80.0 - 99.0 FL  
 MCH 33.6 26.0 - 34.0 PG  
 MCHC 33.3 30.0 - 36.5 g/dL  
 RDW 17.5 (H) 11.5 - 14.5 % PLATELET 123 601 - 946 K/uL MPV 10.4 8.9 - 12.9 FL  
 NRBC 0.2 (H) 0  WBC ABSOLUTE NRBC 0.02 (H) 0.00 - 0.01 K/uL NEUTROPHILS 80 (H) 32 - 75 % LYMPHOCYTES 9 (L) 12 - 49 % MONOCYTES 8 5 - 13 % EOSINOPHILS 2 0 - 7 % BASOPHILS 0 0 - 1 % IMMATURE GRANULOCYTES 1 (H) 0.0 - 0.5 % ABS. NEUTROPHILS 9.6 (H) 1.8 - 8.0 K/UL  
 ABS. LYMPHOCYTES 1.2 0.8 - 3.5 K/UL  
 ABS. MONOCYTES 1.0 0.0 - 1.0 K/UL  
 ABS. EOSINOPHILS 0.3 0.0 - 0.4 K/UL  
 ABS. BASOPHILS 0.1 0.0 - 0.1 K/UL  
 ABS. IMM. GRANS. 0.1 (H) 0.00 - 0.04 K/UL  
 DF AUTOMATED MAGNESIUM Collection Time: 10/14/20  2:48 AM  
Result Value Ref Range Magnesium 1.8 1.6 - 2.4 mg/dL METABOLIC PANEL, COMPREHENSIVE Collection Time: 10/14/20  2:48 AM  
Result Value Ref Range Sodium 136 136 - 145 mmol/L Potassium 4.1 3.5 - 5.1 mmol/L Chloride 101 97 - 108 mmol/L  
 CO2 25 21 - 32 mmol/L Anion gap 10 5 - 15 mmol/L Glucose 125 (H) 65 - 100 mg/dL BUN 13 6 - 20 MG/DL Creatinine 0.75 0.55 - 1.02 MG/DL  
 BUN/Creatinine ratio 17 12 - 20 GFR est AA >60 >60 ml/min/1.73m2 GFR est non-AA >60 >60 ml/min/1.73m2 Calcium 8.5 8.5 - 10.1 MG/DL Bilirubin, total 0.8 0.2 - 1.0 MG/DL  
 ALT (SGPT) 36 12 - 78 U/L  
 AST (SGOT) 35 15 - 37 U/L Alk. phosphatase 110 45 - 117 U/L Protein, total 5.5 (L) 6.4 - 8.2 g/dL Albumin 2.3 (L) 3.5 - 5.0 g/dL Globulin 3.2 2.0 - 4.0 g/dL A-G Ratio 0.7 (L) 1.1 - 2.2 PHOSPHORUS Collection Time: 10/14/20  2:48 AM  
Result Value Ref Range Phosphorus 3.0 2.6 - 4.7 MG/DL  
GLUCOSE, POC Collection Time: 10/14/20  6:04 AM  
Result Value Ref Range Glucose (POC) 128 (H) 65 - 100 mg/dL Performed by Meek Gutierrez (PCT) Intake/Output Summary (Last 24 hours) at 10/14/2020 1137 Last data filed at 10/14/2020 9715 Gross per 24 hour Intake 25 ml Output  Net 25 ml Patient Vitals for the past 24 hrs: 
 Temp Pulse Resp BP SpO2  
10/14/20 0740 97.2 °F (36.2 °C) 94 18 (!) 139/94 90 % 10/13/20 2026 97.3 °F (36.3 °C) 99 18 120/84 90 % 10/13/20 1858  80     
10/13/20 1623    (!) 143/94   
10/13/20 1418 97.6 °F (36.4 °C) 100 18 (!) 143/94 95 % General:    Alert, can communicate, no distress. Psychiatric:    Normal Mood and affect Eye/ENT:      Pupils equal, No asymmetry, Conjunctival pink. Able to hear voice at normal amplitude. JVD noted with enhances hepatojugular reflex. Lungs:      Visibly symmetric chest expansion, No palpable tenderness. Reduce breath sounds are bilaterally. Heart[de-identified]    Irregular rate and rhythm, S1, S2 variable, flow murmur across aortic valve. No click, rub or gallop. No JVD, Normal palpable peripheral pulses. No cyanosis Abdomen:     Soft, non-tender. Bowel sounds normal. No masses,  No   
  organomegaly. Extremities:   Extremities normal, atraumatic, no edema. Neurologic:   CN II-XII grossly intact. No gross focal deficits Deborah Taylor MD 
10/14/2020  
11:37 AM  
 
[facility] Office:   [address] [facility] Office: 
[address]     [address] [facility] [address]     [address] [place] [address] Nw P: [id number]    P: [phone] F: [phone]    F: [phone]

## 2020-10-14 NOTE — PROGRESS NOTES
Palliative Medicine Consult [place]: [phone]) Patient Name: Linda Walden YOB: 1927 Date of Initial Consult: 10/6/2020 Reason for Consult: Care decisions Requesting Provider: Dr. Rebekah Spangler Primary Care Physician: Isreal Rubio MD 
 
 SUMMARY:  
Linda Walden is a 80 y.o. female with a past history of chronic atrial fib, CAD, and a recent right humerus fracture s/p ORIF, who was admitted on 9/27/2020 from home with a diagnosis of slurred speech concerning for TIA/CVA. She had multiple episodes of slurred speech at home and presented to the ED. She was admitted for further management. MRI of the brain showed chronic white matter disease with no acute infarction. Speech therapy was consulted and was concerned about the possibility of esophageal dysphagia with obstruction. GI was consulted and an EGD is planned for 10/8/20 to further evaluate. Current medical issues leading to Palliative Medicine involvement include: dysphagia, need to discuss care decisions. Social: She is a . She has three sons who are very involved in her care. She previously lived alone, but will now be moving in with her son Aaliyah Conner who lives locally. PALLIATIVE DIAGNOSES:  
1. Goals of care 2. Failure to thrive 3. Feeding difficulties 4. Anorexia 5. Dysphagia 6. Impaired mobility 7. Depression PLAN:  
1. Patient seen at bedside along with Omari García LMSW. She is sleeping quietly. Her son is at the bedside. 2. Family meeting held with myself, Argentina Junior, and her son with additional sons Aaliyah Conner and Audra Salgado on the phone. We reviewed her medical condition and all that she has been through the last few months. We talked at length about her decreased PO intake and their concerns regarding this. We explained that the reason behind her anorexia is likely multifactorial and not as simple or black and white as wanting to live versus wanting to die. 3. We talked about the importance of understanding what the patient's goals and wishes are and how we can honor these. The family says that she has stated to them that she does not want a feeding tube. We encouraged them to talk with her further about other limits that she would place on her care moving forward, such as whether she is willing to come back and forth to the hospital for IV fluid hydration if she gets dehydrated or whether she would rather remain at home with the understanding that her time is limited. 4. We discussed hospice care and that this kind of support would be recommended for her at home if she continues to decline. 5. We talked about her recent diagnosis of depression by psychiatry. The family is hopeful that the antidepressants she was started on will start to improve her mood, appetite, and sleep patterns. They understand it may take weeks to see effects from the medication and they are concerned that she will \"starve to death\" in the interim. 6. The family will talk with patient Roxana at the bedside this afternoon and try to get a better understanding of what her wishes and goals are, both short term and long term. Palliative team will follow up with them after their meeting to offer further support and assistance with care decisions. 7. Communicated plan of care with: Palliative IDT, [facility] Team (Dr. Elie Rivers) Addendum: Family meeting held this afternoon at the bedside with saloómn Elise and Libia. We had a lengthy discussion about quality of life, goals, and options. Both Libia and Jessica Elise agree that the patient's current quality of life here in the hospital would be unacceptable to her and that she wants to be at home. Now the question is how to care for her at home. We thoroughly discussed home health versus hospice. When talking with patient Roxana, her speech was garbled and she was speaking in third person.  She would not directly answer most of our questions. She does keep making comments like, \"I just can't\" and \"I'm so tired\". We encouraged the family to continue talking about the best way to care for her at home in a way that honors her wishes and goals. We will follow up with them tomorrow. GOALS OF CARE / TREATMENT PREFERENCES:  
 
GOALS OF CARE: 
Patient/Health Care Proxy Stated Goals: Prolong life TREATMENT PREFERENCES:  
Code Status: DNR Advance Care Planning: 
[x] [facility] Interdisciplinary Team has updated the ACP Navigator with [address] and Patient Capacity Primary Decision Maker: Melissa [facility] - [phone] Secondary Decision Maker: Nataliia [facility] - [phone] Advance Care Planning 9/28/2020 Patient's Healthcare Decision Maker is: Named in scanned ACP document Confirm Advance Directive Yes, on file Medical Interventions: Limited additional interventions Other Instructions:  
Artificially Administered Nutrition: No feeding tube Other: As far as possible, the palliative care team has discussed with patient / health care proxy about goals of care / treatment preferences for patient. HISTORY:  
 
History obtained from: chart, son CHIEF COMPLAINT:  
 
HPI/SUBJECTIVE: The patient is:  
[] Verbal and participatory [] Non-participatory due to: She is sleeping quietly and does not appear to be in distress. Clinical Pain Assessment (nonverbal scale for severity on nonverbal patients):  
Clinical Pain Assessment Severity: 0 Activity (Movement): Lying quietly, normal position Duration: for how long has pt been experiencing pain (e.g., 2 days, 1 month, years) Frequency: how often pain is an issue (e.g., several times per day, once every few days, constant) FUNCTIONAL ASSESSMENT:  
 
Palliative Performance Scale (PPS): PPS: 50  PSYCHOSOCIAL/SPIRITUAL SCREENING:  
 
 Palliative IDT has assessed this patient for cultural preferences / practices and a referral made as appropriate to needs (Cultural Services, Patient Advocacy, Ethics, etc.) Any spiritual / Taoism concerns: 
[] Yes /  [x] No 
 
Caregiver Burnout: 
[] Yes /  [x] No /  [] No Caregiver Present Anticipatory grief assessment:  
[x] Normal  / [] Maladaptive ESAS Anxiety: Anxiety: 0 
 
ESAS Depression: Depression: 4 REVIEW OF SYSTEMS:  
 
Positive and pertinent negative findings in ROS are noted above in HPI. The following systems were [] reviewed / [x] unable to be reviewed as noted in HPI Other findings are noted below. Systems: constitutional, ears/nose/mouth/throat, respiratory, gastrointestinal, genitourinary, musculoskeletal, integumentary, neurologic, psychiatric, endocrine. Positive findings noted below. Modified ESAS Completed by: provider Fatigue: 10 Drowsiness: 10  
Depression: 4 Pain: 0 Anxiety: 0 Nausea: 0 Anorexia: 8 Dyspnea: 0 Stool Occurrence(s): 1 PHYSICAL EXAM:  
 
From RN flowsheet: 
Wt Readings from Last 3 Encounters:  
10/13/20 130 lb (59 kg) 09/26/20 122 lb 12.7 oz (55.7 kg) 09/24/20 125 lb (56.7 kg) Blood pressure 126/81, pulse (!) 104, temperature 97.2 °F (36.2 °C), resp. rate 18, height 5' 2\" (1.575 m), weight 130 lb (59 kg), SpO2 93 %, not currently breastfeeding. Pain Scale 1: Numeric (0 - 10) Pain Intensity 1: 0 Pain Onset 1: acute Pain Location 1: Head 
Pain Orientation 1: Anterior Pain Description 1: Aching Pain Intervention(s) 1: Rest 
Last bowel movement, if known:  
 
Constitutional: sleeping quietly, frail, elderly, no acute distress Eyes: closed ENMT: no nasal discharge, moist mucous membranes Respiratory: breathing not labored, symmetric Musculoskeletal: no deformity, no tenderness to palpation Skin: warm, dry Neurologic: moving all extremities Psychiatric: unable to assess HISTORY:  
 
Active Problems: TIA (transient ischemic attack) (9/27/2020) UTI (urinary tract infection) (9/29/2020) Goals of care, counseling/discussion () Failure to thrive in adult () Feeding difficulties () Impaired mobility () Depression, unspecified depression type () Past Medical History:  
Diagnosis Date  CAD (coronary artery disease)  Gastrointestinal disorder History reviewed. No pertinent surgical history. History reviewed. No pertinent family history. History reviewed, no pertinent family history. Social History Tobacco Use  Smoking status: Never Smoker  Smokeless tobacco: Never Used Substance Use Topics  Alcohol use: Not Currently Allergies Allergen Reactions  Ambien [Zolpidem] Unknown (comments)  Fosamax [Alendronate] Unknown (comments) Current Facility-Administered Medications Medication Dose Route Frequency  escitalopram oxalate (LEXAPRO) tablet 5 mg  5 mg Oral DAILY  sodium chloride (NS) flush 5-40 mL  5-40 mL IntraVENous Q8H  
 sodium chloride (NS) flush 5-40 mL  5-40 mL IntraVENous PRN  
 miconazole (MICOTIN) 2 % powder   Topical Q12H  
 melatonin tablet 3 mg  3 mg Oral QHS PRN  mirtazapine (REMERON) tablet 7.5 mg  7.5 mg Oral QHS  lactobac ac& pc-s.therm-b.anim (ISABELLE Q/RISAQUAD)  1 Cap Oral DAILY  ondansetron (ZOFRAN) injection 4 mg  4 mg IntraVENous Q6H PRN  
 nystatin (MYCOSTATIN) 100,000 unit/mL oral suspension 500,000 Units  500,000 Units Swish and Swallow QID  therapeutic multivitamin (THERAGRAN) tablet 1 Tab  1 Tab Oral DAILY  polyethylene glycol (MIRALAX) packet 17 g  17 g Oral DAILY  docusate sodium (COLACE) capsule 100 mg  100 mg Oral BID PRN  
 albuterol (PROVENTIL VENTOLIN) nebulizer solution 2.5 mg  2.5 mg Nebulization TID PRN  
 amiodarone (CORDARONE) tablet 200 mg  200 mg Oral DAILY  apixaban (ELIQUIS) tablet 2.5 mg  2.5 mg Oral BID  
  latanoprost (XALATAN) 0.005 % ophthalmic solution 1 Drop  1 Drop Both Eyes QHS  metoprolol tartrate (LOPRESSOR) tablet 50 mg  50 mg Oral QPM  
 pravastatin (PRAVACHOL) tablet 10 mg  10 mg Oral QHS  timolol (TIMOPTIC) 0.5 % ophthalmic solution 1 Drop  1 Drop Both Eyes BID  acetaminophen (TYLENOL) tablet 650 mg  650 mg Oral Q4H PRN  
 aspirin chewable tablet 81 mg  81 mg Oral DAILY  labetaloL (NORMODYNE;TRANDATE) injection 5 mg  5 mg IntraVENous Q10MIN PRN  
 
 
 
 LAB AND IMAGING FINDINGS:  
 
Lab Results Component Value Date/Time WBC 12.2 (H) 10/14/2020 02:48 AM  
 HGB 14.6 10/14/2020 02:48 AM  
 PLATELET 237 74/42/8158 02:48 AM  
 
Lab Results Component Value Date/Time Sodium 136 10/14/2020 02:48 AM  
 Potassium 4.1 10/14/2020 02:48 AM  
 Chloride 101 10/14/2020 02:48 AM  
 CO2 25 10/14/2020 02:48 AM  
 BUN 13 10/14/2020 02:48 AM  
 Creatinine 0.75 10/14/2020 02:48 AM  
 Calcium 8.5 10/14/2020 02:48 AM  
 Magnesium 1.8 10/14/2020 02:48 AM  
 Phosphorus 3.0 10/14/2020 02:48 AM  
  
Lab Results Component Value Date/Time Alk. phosphatase 110 10/14/2020 02:48 AM  
 Protein, total 5.5 (L) 10/14/2020 02:48 AM  
 Albumin 2.3 (L) 10/14/2020 02:48 AM  
 Globulin 3.2 10/14/2020 02:48 AM  
 
Lab Results Component Value Date/Time INR 1.1 09/27/2020 05:22 PM  
 Prothrombin time 11.5 (H) 09/27/2020 05:22 PM  
  
No results found for: IRON, FE, TIBC, IBCT, PSAT, FERR No results found for: PH, PCO2, PO2 No components found for: [place] No results found for: CPK, CKMB Total time: 65 min Counseling / coordination time, spent as noted above: 60 min 
> 50% counseling / coordination?: yes Prolonged service was provided for  [x]30 min   []75 min in face to face time in the presence of the patient, spent as noted above. Time Start: 0088-4710, [id number] Time End:  
Note: this can only be billed with 88202 (initial) or 65752 (follow up). If multiple start / stop times, list each separately.

## 2020-10-14 NOTE — PROGRESS NOTES
Problem: Mobility Impaired (Adult and Pediatric) Goal: *Acute Goals and Plan of Care (Insert Text) Description: FUNCTIONAL STATUS PRIOR TO ADMISSION: Patient was independent and active without the use of DME prior to her shoulder injury (R humeral fx w/ ORIF) about a month ago. Since that time, the patient has relied on a manual wheelchair pushed by caregivers for ambulation. She is able to walk short distances with considerable assistance from caregivers. She was not able to sit unsupported. HOME SUPPORT PRIOR TO ADMISSION: The patient lived alone with no support required prior to her shoulder injury. She discharged from [facility] to her son and daughter in [address] [place] [address]. Caregivers assist with adls and mobility. Patient is open to home health for PT/ OT. Physical Therapy Goals Weekly re-assessment 10/12/2020 1. Patient will move from supine to sit and sit to supine , scoot up and down, and roll side to side in bed with moderate assistance  within 7 day(s). 2.  Patient will transfer from bed to chair and chair to bed with moderate assistance  using the least restrictive device within 7 day(s). 3.  Patient will perform sit to stand with moderate assistance  within 7 day(s). 4.  patient will sit unsupported x 2 minutes with supervision/CGA assist within 7 days. Initiated 9/28/2020 1. Patient will move from supine to sit and sit to supine , scoot up and down, and roll side to side in bed with moderate assistance  within 7 day(s). 2.  Patient will transfer from bed to chair and chair to bed with moderate assistance  using the least restrictive device within 7 day(s). 3.  Patient will perform sit to stand with moderate assistance  within 7 day(s). 4.  Patient will ambulate with moderate assistance  for 25 feet with the least restrictive device within 7 day(s). discontinue Outcome: Not Progressing Towards Goal 
 PHYSICAL THERAPY TREATMENT Patient: Maame Ernandez (54 y.o. female) Date: 10/14/2020 Diagnosis: TIA (transient ischemic attack) [G45.9] UTI (urinary tract infection) [N39.0] <principal problem not specified> Procedure(s) (LRB): ESOPHAGOGASTRODUODENOSCOPY (EGD)   :- (N/A) ESOPHAGOGASTRODUODENAL (EGD) BIOPSY (N/A) 6 Days Post-Op Precautions: Fall(RUE sling s/p ORIF approx 4 wks ago) Chart, physical therapy assessment, plan of care and goals were reviewed. ASSESSMENT Patient continues with skilled PT services and is not progressing towards goals. Patient much more lethargic today and speech very garbled. She is resistant to therapy today but with encouragement did sit EOB. Son present and encouraging. Deemed not safe to transfer to chair today and returned to supine. Patient continues to fluctuate and limited tolerance for any activity. Family still plans on taking her home. .  
 
Current Level of Function Impacting Discharge (mobility/balance): max to total assist today; poor sitting balance Other factors to consider for discharge: PLAN : 
Patient continues to benefit from skilled intervention to address the above impairments. Continue treatment per established plan of care. to address goals. Recommendation for discharge: (in order for the patient to meet his/her long term goals) Physical therapy at least 2 days/week in the home AND ensure assist and/or supervision for safety with all mobility/ADLS This discharge recommendation: 
Has been made in collaboration with the attending provider and/or case management IF patient discharges home will need the following DME: hospital bed ? and mechanical lift SUBJECTIVE:  
Patient stated Leave me alone.  OBJECTIVE DATA SUMMARY:  
Critical Behavior: 
Neurologic State: Alert Orientation Level: Oriented to person, Oriented to place, Other (Comment)(oriented to month and year) Cognition: Decreased attention/concentration, Decreased command following, Memory loss Safety/Judgement: Decreased awareness of environment(reports poor vision) Functional Mobility Training: 
Bed Mobility: 
  
Supine to Sit: Maximum assistance Sit to Supine: Assist x2;Total assistance Scooting: Total assistance Balance: 
Sitting: Impaired; With support Sitting - Static: Poor (constant support) Activity Tolerance:  
Poor- is on supplemental O2 today up to 4 liters Please refer to the flowsheet for vital signs taken during this treatment. After treatment patient left in no apparent distress:  
Supine in bed, Heels elevated for pressure relief, Call bell within reach, Caregiver / family present, and Side rails x 3 
 
COMMUNICATION/COLLABORATION:  
The patients plan of care was discussed with: Registered nurse. Gem Andre, PT Time Calculation: 20 mins

## 2020-10-14 NOTE — PROGRESS NOTES
Problem: Falls - Risk of 
Goal: *Absence of Falls Description: Document Jen De La Cruz Fall Risk and appropriate interventions in the flowsheet. Outcome: Progressing Towards Goal 
Note: Fall Risk Interventions: 
Mobility Interventions: Communicate number of staff needed for ambulation/transfer Mentation Interventions: Reorient patient, More frequent rounding, Door open when patient unattended, Evaluate medications/consider consulting pharmacy Medication Interventions: Evaluate medications/consider consulting pharmacy, Patient to call before getting OOB, Teach patient to arise slowly Elimination Interventions: Call light in reach, Elevated toilet seat, Toileting schedule/hourly rounds History of Falls Interventions: Evaluate medications/consider consulting pharmacy, Door open when patient unattended Problem: Patient Education: Go to Patient Education Activity Goal: Patient/Family Education Outcome: Progressing Towards Goal 
  
Problem: Pressure Injury - Risk of 
Goal: *Prevention of pressure injury Description: Document Josue Scale and appropriate interventions in the flowsheet. Outcome: Progressing Towards Goal 
Note: Pressure Injury Interventions: 
Sensory Interventions: Float heels, Keep linens dry and wrinkle-free Moisture Interventions: Check for incontinence Q2 hours and as needed, Offer toileting Q_hr Activity Interventions: PT/OT evaluation, Increase time out of bed Mobility Interventions: PT/OT evaluation, HOB 30 degrees or less, Float heels Nutrition Interventions: Document food/fluid/supplement intake Friction and Shear Interventions: HOB 30 degrees or less, Lift sheet Problem: Patient Education: Go to Patient Education Activity Goal: Patient/Family Education Outcome: Progressing Towards Goal 
  
Problem: Patient Education: Go to Patient Education Activity Goal: Patient/Family Education Outcome: Progressing Towards Goal 
  
 Problem: Patient Education: Go to Patient Education Activity Goal: Patient/Family Education Outcome: Progressing Towards Goal 
  
Problem: Urinary Tract Infection - Adult Goal: *Absence of infection signs and symptoms Outcome: Progressing Towards Goal 
  
Problem: Patient Education: Go to Patient Education Activity Goal: Patient/Family Education Outcome: Progressing Towards Goal 
  
Problem: Nutrition Deficit Goal: *Optimize nutritional status Outcome: Progressing Towards Goal 
  
Problem: Patient Education: Go to Patient Education Activity Goal: Patient/Family Education Outcome: Progressing Towards Goal

## 2020-10-14 NOTE — PROGRESS NOTES
Physician Progress Note Juani Perry 
CSN #:                  [id number] :                       5/15/1927 ADMIT DATE:       2020 4:55 PM 
[address] [place] DATE: 
RESPONDING 
PROVIDER #:        SANJAY RICHMOND MD 
 
 
 
 
QUERY TEXT: 
 
Pt admitted with TIA and has CHF documented by cardiology PN 10/14/20. If possible, please document in progress notes and discharge summary further specificity regarding the type and acuity of CHF: 
 
The medical record reflects the following: 
Risk Factors: 79 y/o female admitted with TIA, PMHx CAD, Cardiology has been consulted to make recommendations regarding congestive heart failure Clinical Indicators: 10/14/20 cardiology MD PN: Sharon Huber has been requiring oxygen during this hospitalization and hence chest x-ray was performed which demonstrated possible blunting of bilateral costophrenic angles suggestive of pleural effusion\". \"Echocardiogram was performed which demonstrated severe reduction in LV systolic function with all 4 chamber dilatation\". \"Heart failure with ejection fraction likely secondary to 2 Recurrent persistent atrial fibrillation with intermittent rapid ventricular rate\". \"Clinically I feel that she has at least mild acute on chronic heart failure with reduced ejection fraction\" \"Possible acute on chronic congestive heart failure due to systolic dysfunction\". 10/13/20 ECHO LV: Estimated LVEF is 25 - 30%. Visually measured ejection fraction. Normal cavity size. Upper normal wall thickness. Moderate-to-severely reduced systolic function. Wall motion: normal. 
 
Treatment: admit to hospital, Hospitalist consult, Cardiology consult, echo, Labs, cxr, Cheryle J Rilee RN,BSN Clinical Documentation Integrity [phone] Options provided: 
-- Acute on Chronic Systolic CHF/HFrEF 
-- Acute Systolic CHF/HFpEF 
-- Acute Diastolic CHF/HFpEF 
-- Chronic Systolic CHF/HFrEF 
-- Chronic Diastolic CHF/HFpEF 
 -- Other - I will add my own diagnosis -- Disagree - Not applicable / Not valid -- Disagree - Clinically unable to determine / Unknown 
-- Refer to Clinical Documentation Reviewer PROVIDER RESPONSE TEXT: 
 
This patient is in acute on chronic systolic CHF/HFrEF.  
 
Query created by: Trudy Cerrato on 10/14/2020 3:13 PM 
 
 
Electronically signed by:  Christophe Staley MD 10/14/2020 7:24 PM

## 2020-10-14 NOTE — PROGRESS NOTES
Problem: Self Care Deficits Care Plan (Adult) Goal: *Acute Goals and Plan of Care (Insert Text) Description:  
FUNCTIONAL STATUS PRIOR TO ADMISSION: Patient was independent with ADLs. Discharged from [facility] 9/22/2020 to [facility] s/p fall and R humeral fx with ORIF. The patient was functional at the wheelchair level and HHA for sit<>stand and functional mobility. Patient required moderate assistance for basic and total assist instrumental ADLs. Was about to have aid setup and HHOT when this episode occurred. HOME SUPPORT: The patient lived alone. However, then discharged to [facility]. Occupational Therapy Goals Goals reviewed and continued: 10/12/2020 Initiated 9/28/2020 1. Patient will perform self-feeding with supervision/set-up within 7 day(s). 2.  Patient will perform grooming with supervision/set-up within 7 day(s). 3.  Patient will perform sitting unsupported ADLs 4 mins with minimal assistance/contact guard assist within 7 day(s). 4.  Patient will perform BSC toilet transfers with moderate assistance  within 7 day(s). 5.  Patient will perform all aspects of toileting with moderate assistance  within 7 day(s). 6.  Patient will perform upper body dressing with max A within 7 days. Outcome: Progressing Towards Goal 
 OCCUPATIONAL THERAPY TREATMENT Patient: Areli Carter (71 y.o. female) Date: 10/14/2020 Diagnosis: TIA (transient ischemic attack) [G45.9] UTI (urinary tract infection) [N39.0] <principal problem not specified> Procedure(s) (LRB): ESOPHAGOGASTRODUODENOSCOPY (EGD)   :- (N/A) ESOPHAGOGASTRODUODENAL (EGD) BIOPSY (N/A) 6 Days Post-Op Precautions: Fall(RUE sling s/p ORIF approx 4 wks ago) Chart, occupational therapy assessment, plan of care, and goals were reviewed. ASSESSMENT Patient continues with skilled OT services and is progressing towards goals.   Patient received in bed and participating in grooming in long sit this date. Patient pushes herself to use right UE at elbow and hand to assist in grooming, but demonstrates understanding of maintaining immobility at right shoulder (no clearance for mobility for ORIF of humerus approximately 6 to 7 weeks ago). Participation impacted by impaired strength, endurance, and tolerance for functional activity, impaired right shoulder mobility (passive and active), impaired bilateral UE strength, impaired memory. Current Level of Function Impacting Discharge (ADLs): Min to total assist for grooming in long sit, total assist for self care and functional transfers Other factors to consider for discharge: global weakness PLAN : 
Patient continues to benefit from skilled intervention to address the above impairments. Continue treatment per established plan of care. to address goals. Recommend with staff: Sadie Dan in chair with PT or Jeffery lift as tolerated, Min to total assist for grooming in long sit, total assist for self care and functional transfers Recommend next OT session: any self care activity Recommendation for discharge: (in order for the patient to meet his/her long term goals)Per family's request: 
Occupational therapy at least 2 days/week in the home AND ensure assist and/or supervision for safety with self care and mobility This discharge recommendation: 
Has been made in collaboration with the attending provider and/or case management IF patient discharges home will need the following DME: bedside commode, gait belt, hospital bed, walker: rolling, and wheelchair, possibly mechanical lift SUBJECTIVE:  
Patient stated I'm just so weak.  OBJECTIVE DATA SUMMARY:  
Cognitive/Behavioral Status: 
Neurologic State: Alert Orientation Level: Oriented to person;Oriented to place; Other (Comment)(oriented to month and year) Cognition: Decreased attention/concentration;Decreased command following;Memory loss Perception: Appears intact Perseveration: No perseveration noted Safety/Judgement: Decreased awareness of environment(reports poor vision) Functional Mobility and Transfers for ADLs: 
  
 
ADL Intervention: 
  
 
Grooming Position Performed: Long sitting on bed Washing Face: Minimum assistance(for efficiency) Washing Hands: Set-up Brushing Teeth: Stand-by assistance(electric toothbrush) Brushing/Combing Hair: Total assistance (dependent) Cues: Physical assistance; Tactile cues provided;Verbal cues provided Upper Body Bathing Bathing Assistance: Total assistance(dependent)(unable to perform or initiate) Position Performed: Long sitting on bed Cues: Physical assistance Cognitive Retraining Safety/Judgement: Decreased awareness of environment(reports poor vision) Therapeutic Exercises:  
Requested to use resistive hand sponge. Performed with bilateral hands with one cue for 5 reps each. Activity Tolerance:  
Poor Please refer to the flowsheet for vital signs taken during this treatment. After treatment patient left in no apparent distress:  
Heels elevated for pressure relief, Call bell within reach, and long sit in bed COMMUNICATION/COLLABORATION:  
The patients plan of care was discussed with: Registered nurse. Hilda Celis Time Calculation: 19 mins

## 2020-10-14 NOTE — PROGRESS NOTES
Palliative Medicine [facility]: [phone] ([phone]) [facility]: [phone] ([phone]) Code Status: DNR Advance Care Planning: 
Advance Care Planning 9/28/2020 Patient's Healthcare Decision Maker is: Named in scanned ACP document Confirm Advance Directive Yes, on file Primary Decision Maker: Talib Lakhani - Son - [phone] Secondary Decision Maker: Carola Mario - Son - [phone] Patient / Family Encounter Documentation Participants (names): Patient family (sons/MPOA Fidencio Hubbard and Selvin Birmingham and also son Ellie Balbuena); Palliative Medicine (Rashad Castillo and Rhonda Hernández NP) Narrative: Rhonda Hernández and I met with patient son Ellie Balbuena in person and sons Fidencio Hubbard and Selvin Birmingham on phone and then later with Ellie Balbuena and Selvin Birmingham in person along with patient. Ultimately sons are deciding on how to care for patient going forward. They are concerned about what a drastic difference patient health is in since 6 weeks ago before fall when she was living alone. Very big decline for her. Acknowledge things have changed now, but struggling to accept patient may be winding down. What is clear is that patient is not eating and doesn't appear to have medical reason/barrier to eating. Sons concerned that this is \"just depression. \" When talking to patient, she is awake and oriented, though speaks in third person which is not usual for her. However, she appears to understand basics of what it means to not eat (leads to dying), says she is not concerned about anything, and says the most important thing to her are her kids. Attempted to discuss hospice as option but she does not engage, states she is too tired to talk. While it is unclear to sons what patient actually wants going forward (sometimes she says she's ready to die, sometimes she says she wants to live), they agree she probably doesn't want to be in hospital, and lying in bed doing nothing is not good QOL for such a previously active woman. They feel they are racing against time hoping anti depressant begins to work so she will start eating and move forward in life. Reviewed options of home with Mike Cassidy with [facility] or with hospice. Hospice service reviewed in detail - possible that patient might feel better at home with hospice support and might be more interested in food. [address] [place] [address] seems to understand this better, Mike Cassidy feels it is giving up rather than providing gentle care for a person who is fragile and winding down. Recommend hospice education session for more information and to keep talking. Goals of Care / Plan:  
 
Family deciding on home health or hospice. Will f/u THursday. Thank you for the opportunity to be involved in the care of Ms. De León and her family. Saw Staples LMSW, Supervisee in Social Work Palliative Medicine  [phone] Start time: 14:45 End time: 16:00

## 2020-10-14 NOTE — WOUND CARE
Wound Care Note: Follow-up visit for breast 
 
Chart shows: 
Admitted for TIA, UTI, counseling/education goals of care, failure to thrive in adult, feeding difficulties and impaired mobility Past Medical History:  
Diagnosis Date  CAD (coronary artery disease)  Gastrointestinal disorder WBC = 12.2 on 10/14/20 Admitted from home Assessment:  
Patient is alert and talking, incontinent with some assistance needed in repositioning. Bed: [facility] Patient wearing briefs for incontinence. Diet: Dental Soft with nutritional supplements Patient reports no pain. Bilateral heels skin intact with blanchable erythema. Bilateral buttocks and sacral skin intact and without erythema. 1. POA maculopapular erythematous rash with satellite lesions consistent with yeast to right breast is improving. Continue with antifungal powder. Patient repositioned on right side. Heels offloaded on pillows. Recommendations:   
Continue with current wound care. Bilateral breast- Every 12 hours gently cleanse with soap and water, blot dry, apply antifungal powder and gently rub into skin Skin Care & Pressure Prevention: 
Minimize layers of linen/pads under patient to optimize support surface. Turn/reposition approximately every 2 hours and offload heels. Manage incontinence / promote continence Nourishing Skin Cream to dry skin, minimize use of briefs when able Discussed above plan with patient Karlene Dakin, RN Transition of Care: Plan to follow as needed while admitted to hospital. 
 
EZEQUIEL Rivero, RN, [facility], [facility]. 
office [phone] 
pager [phone] or call  to page

## 2020-10-15 PROBLEM — I50.84 END STAGE HEART FAILURE (HCC): Status: ACTIVE | Noted: 2020-01-01

## 2020-10-15 NOTE — NURSE NAVIGATOR
Chart reviewed by Heart Failure Nurse Navigator. Heart Failure database completed. EF:  25/30% ACEi/ARB/ARNi: need documentation of contraindication or reason not prescribed BB: currently on lopressor 50 mg every evening (consider change to GDMT beta blocker - toprol xl, carvedilol, bisoprolol) Aldosterone Antagonist: need documentation of contraindication or reason not prescribed Obstructive Sleep Apnea Screening: Screening priority 4, advanced age STOP-BANG score: 
 Referred to Sleep Medicine: CRT: pt is currently DNR. NYHA Functional Class documentation requested. Heart Failure Teach Back in Patient Education. Heart Failure Avoiding Triggers on Discharge Instructions. Cardiologist: DONYA Dunlap Post discharge follow up phone call to be made within 48-72 hours of discharge.

## 2020-10-15 NOTE — PROGRESS NOTES
Problem: Mobility Impaired (Adult and Pediatric) Goal: *Acute Goals and Plan of Care (Insert Text) Description: FUNCTIONAL STATUS PRIOR TO ADMISSION: Patient was independent and active without the use of DME prior to her shoulder injury (R humeral fx w/ ORIF) about a month ago. Since that time, the patient has relied on a manual wheelchair pushed by caregivers for ambulation. She is able to walk short distances with considerable assistance from caregivers. She was not able to sit unsupported. HOME SUPPORT PRIOR TO ADMISSION: The patient lived alone with no support required prior to her shoulder injury. She discharged from [facility] to her son and daughter in [address] [place] [address]. Caregivers assist with adls and mobility. Patient is open to home health for PT/ OT. Physical Therapy Goals Weekly re-assessment 10/12/2020 1. Patient will move from supine to sit and sit to supine , scoot up and down, and roll side to side in bed with moderate assistance  within 7 day(s). 2.  Patient will transfer from bed to chair and chair to bed with moderate assistance  using the least restrictive device within 7 day(s). 3.  Patient will perform sit to stand with moderate assistance  within 7 day(s). 4.  patient will sit unsupported x 2 minutes with supervision/CGA assist within 7 days. Initiated 9/28/2020 1. Patient will move from supine to sit and sit to supine , scoot up and down, and roll side to side in bed with moderate assistance  within 7 day(s). 2.  Patient will transfer from bed to chair and chair to bed with moderate assistance  using the least restrictive device within 7 day(s). 3.  Patient will perform sit to stand with moderate assistance  within 7 day(s). 4.  Patient will ambulate with moderate assistance  for 25 feet with the least restrictive device within 7 day(s). discontinue Outcome: Not Progressing Towards Goal 
 PHYSICAL THERAPY TREATMENT Patient: Mady Ferris (25 y.o. female) Date: 10/15/2020 Diagnosis: TIA (transient ischemic attack) [G45.9] UTI (urinary tract infection) [N39.0] <principal problem not specified> Procedure(s) (LRB): ESOPHAGOGASTRODUODENOSCOPY (EGD)   :- (N/A) ESOPHAGOGASTRODUODENAL (EGD) BIOPSY (N/A) 7 Days Post-Op Precautions: Fall(RUE sling s/p ORIF approx 4 wks ago) Chart, physical therapy assessment, plan of care and goals were reviewed. ASSESSMENT Patient continues with skilled PT services and is not progressing towards goals. Patient received in bed and son present. Family now considering hospice and awaiting meeting. Patient expressed wanting to work with therapy and was able to participate in bed exercises. She also expressed willingness to sit up on EOB but once initiated with her, requested to not sit up, stating \"i'm so tired. Don't make me. \"  assisted back to supine and son to assist with lunch. Overall patient will steady decline this week and family appears to be adjusting accordingly stating likely home with hospice. Dennis Milan Current Level of Function Impacting Discharge (mobility/balance): max to total assist 
 
Other factors to consider for discharge: PLAN : 
Patient continues to benefit from skilled intervention to address the above impairments. Continue treatment per established plan of care. to address goals. Recommendation for discharge: (in order for the patient to meet his/her long term goals) To be determined: home with [facility] vs hospice This discharge recommendation: 
Has been made in collaboration with the attending provider and/or case management IF patient discharges home will need the following DME: hospital bed and mechanical lift SUBJECTIVE:  
Patient stated let me down please.  OBJECTIVE DATA SUMMARY:  
Critical Behavior: 
Neurologic State: Confused, Lethargic Orientation Level: Oriented to person, Oriented to place, Disoriented to situation, Disoriented to time Cognition: Memory loss, Decreased attention/concentration, Decreased command following Safety/Judgement: Decreased awareness of environment(reports poor vision) Functional Mobility Training: 
Bed Mobility: 
Rolling: Maximum assistance Therapeutic Exercises: Active heel slides, hip abduction, ankle pumps Pain Ratin Activity Tolerance:  
Poor and oxygen saturation on 5 liters, 88% Please refer to the flowsheet for vital signs taken during this treatment. After treatment patient left in no apparent distress:  
Supine in bed, Call bell within reach, Caregiver / family present, and Side rails x 3 
 
COMMUNICATION/COLLABORATION:  
The patients plan of care was discussed with: Registered nurse. Kaylah Maradiaga, PT Time Calculation: 13 mins 2

## 2020-10-15 NOTE — HOSPICE
[facility]  [phone]    Inpatient Nursing Admission   Patient Name: Adam Liriano  YOB: 1927  Age: 80 y.o. EDIT FROM earlier note that did not transfer to new shell     Date of Hospice Admission: 10/15/2020  Hospice Attending Elected by Patient: Herma Boeck, MD  Primary Care Physician: Valerio Benton MD  Admitting RN: Akanksha Correa RN  : Ray Ward LCSW     Level of Care (GIP/Routine/Respite): GIP  Facility of Care: [facility]  Patient Room: 621/01     HOSPICE SUMMARY   ER Visits/ Hospitalizations in past year: 2  Hospice Diagnosis: End stage heart failure ([facility] [address] [I50.84]  Onset Date of Hospice Diagnosis: 10/2020  Summary of Disease Progression Leading to Hospice Diagnosis:   Adam Liriano is a 80 y.o. female with a past history of chronic atrial fib, CAD, and a recent right humerus fracture s/p ORIF, who was admitted on 9/27/2020 from home with a diagnosis of slurred speech concerning for TIA/CVA.     She had multiple episodes of slurred speech at home and presented to the ED. She was admitted for further management. MRI of the brain showed chronic white matter disease with no acute infarction. Speech therapy was consulted and was concerned about the possibility of esophageal dysphagia with obstruction. GI was consulted and an EGD is planned for 10/8/20 to further evaluate.     Current medical issues leading to Palliative Medicine involvement include: dysphagia, need to discuss care decisions.     Social: She is a . She has three sons who are very involved in her care. She previously lived alone, but will now be moving in with her son Godwin Wyatt who lives locally.   Co-Morbidities:   Patient Active Problem List   Diagnosis Code    TIA (transient ischemic attack) G45.9    UTI (urinary tract infection) N39.0    Goals of care, counseling/discussion Z71.89    Failure to thrive in adult R62.7    Feeding difficulties R63.3    Impaired mobility Z74.09    Depression, unspecified depression type F32.9    End stage heart failure (HCC) I50.84     Diagnoses RELATED to the terminal prognosis: PCM, TIA  Other Diagnoses:     Rationale for a prognosis of life expectancy of 6 months or less if the disease follows its normal course (Disease Specific History):     Pura Cortés is a 80 y.o. who was admitted to [facility]. The patient's principle diagnosis of ESCD has resulted in dyspnea, weakness and debility. Functionally, the patient's Palliative Performance Scale has declined over a period of days and is estimated at 20. The patient/family chose comfort measures with the support of Hospice. Patient meets for LOC as evidenced by increased work of breathing and agitation. Prognosis estimated based on 10/15/20 clinical assessment is:   [] Few to Many Hours  [x] Hours to Days   [] Few to Many Days   [] Days to Weeks   [] Few to Many Weeks   [] Weeks to Months   [] Few to Many Months    ASSESSMENT    Patient self-reports:  []  Yes    [x] No    SYMPTOMS: Patient with respiratory distress and agitation. Patient received 2 doses of IV morphine earlier today and became very agitated, yelling out and moaning. Patient subsequently calmed and slept. RR remained 24-32. KARNOFSKY: 20-30    Learning Assessment:  Patient  Patient has periods of confusion and agitation. Caregiver  Son, Myla Stubbs is very willing to learn. He is very hesitant to approve administration of opioids for management of his mother's dyspnea. CLINICAL INFORMATION     Wt Readings from Last 3 Encounters:   10/13/20 59 kg (130 lb)   09/26/20 55.7 kg (122 lb 12.7 oz)   09/24/20 56.7 kg (125 lb)     Ht Readings from Last 3 Encounters:   10/13/20 5' 2\" (1.575 m)   09/26/20 5' 2\" (1.575 m)   09/24/20 5' 2\" (1.575 m)     There is no height or weight on file to calculate BMI. There were no vitals taken for this visit.     LAB VALUES  No results found for this visit on 10/15/20 (from the past 12 hour(s)). No results found for this visit on 10/15/20 (from the past 6 hour(s)). Lab Results   Component Value Date/Time    Protein, total 5.5 (L) 10/14/2020 02:48 AM    Albumin 2.3 (L) 10/14/2020 02:48 AM       Currently this patient has:  [x] Supplemental O2 [x] Peripheral IV  [] PICC    [] PORT   [] Murphy Catheter [] NG Tube   [] PEG Tube [] Ostomy    [] AICD: Has ICD been deactivated? [] Yes [] No:______    PLAN     1. Admit patient at the [facility] for management of dypsnea and agitation. 2. Family wants to take patient home tomorrow once patients symptom management plan is in place. 3. Use opioids only with family's permission. Do not use morphine due to patient's recent reaction. IV Dilaudid 0.2 mg q 30 minutes ordered PRN. IV Haldol 1 mg q 1 hour PRN for agitation. 4. Continue medication education with family regarding EOL symptoms. Hospice Team Frequency Orders:  Skilled Nurse -   Daily x 7 days /every other day x 7 days with 5 PRN visits for symptom control. MSW - 1 visit for initial assessment/evaluation for family support and need for volunteer services. Marylou Cushing - 1 visit for initial assessment/evaluation for spiritual support. ADVANCE CARE PLANNING (Complete in ACP Flow Sheet)   Code Status: DNR  Durable DNR: [x]  Yes  []  No  Code Status Discussed/Confirmed:DNR  Preference for Other Life Sustaining Treatment Discussed/Confirmed: yes  Hospitalization Preference: [address] Planning 2020   Patient's Healthcare Decision Maker is: Named in scanned ACP document   Confirm Advance Directive Yes, on file        Service: [] Yes  [x]  No      [] Unknown  Appropriate for Pinning Ceremony:  [] Yes     [x] No  Sabianism: Amish   Home: [address] [place] [address]     1. Discharge Plan: Discharge home with hospice tomorrow once symptom management plan is in place  2.  Patient/Family teaching: EOL symptoms and medications  3. Response to patient/family teaching: receptive to education and resistant to idea of opioid therapy due to concerns with previous reaction. Verbalizes understanding that progression of patient's disease process may warrant opioid therapy for comfort and we will try alternatives to morphine. SOCIAL/EMOTIONAL/SPIRITUAL NEEDS     Spiritual Issues Identified: None at this time    Psych/ Social/ Emotional Issues Identified:  Patient has strong support from her sons. Son Josef Quinonez lives here in [address] [place] [address], other sons live in [place]. If patient goes home, she will live with son Josef Quinonez and his wife, Diomedes Schultz. Caregiver Support:  [x] Provided information on End of Life Care   [] Material Provided: Gone [employer] My [facility]ight or [employer] [place]   Dr. Cara Guido contacted, discharge to hospice order received  Dr. Sonia Colindres contacted, agrees to serve as attending provider for hospice and provided verbal certification of terminal illness with life expectancy of 6 months or less. Orders for hospice admission, medications and plan of treatment received. Medication reconciliation completed. MEDS: See medication list below  DME: Per hospital  Supplies: Per hospital  IDT communication to include MD, SN, SW, CH and support team    ALLERGIES AND MEDICATIONS     Allergies:    Allergies   Allergen Reactions    Ambien [Zolpidem] Unknown (comments)    Fosamax [Alendronate] Unknown (comments)

## 2020-10-15 NOTE — PROGRESS NOTES
Transition of Care Plan 
RUR 16 % Disposition  Home with halima Sorto [phone] and daughter in law Guevara Lamas [phone] And Home  Hospice Transportation   [facility] phone [phone]   Transport confirmed for 12:30 pm 10/16/2020 Hospice   Referral sent to [facility] / [phone] Accepted for home hospice CM received referral for home hospice-- Met with patient and son, Imtiaz Sorto [phone] in patient's room The Plan for Transition of Care is related to the following treatment goals: home hospice The Patient and/or patient representative, halima Sotro, was provided with a choice of provider and agrees  with the discharge plan. Yes [x] No [] A Freedom of choice list was provided with basic dialogue that supports the patient's individualized plan of care/goals and shares the quality data associated with the providers. Yes [x] No [] Home hospice choice was [facility]. Referral sent via [facility]. The plan will be for patient to go home with son and DIL and [facility]. [place] of choice letter placed in chart. Patient has 3 sons   Benji Swift  [phone]   Imtiaz Sorto [phone] (Wife, Guevara Lamas [phone]) and Misty Montanez CM will follow and assist with BLS transport when discharged. Medicare pt has received, reviewed, and signed 2nd IM letter informing them of their right to appeal the discharge. Signed copy has been placed on pt bedside chart. UPDATE 4 pm 
Son met with [facility] Nurse. Patient will be discharged tomorrow home has been  opened to hospice. Equipment --02 and hospital bed. CM sent referral to [facility] for 12:30 pm transport --patient will need 6 liters 02 for transport. Patient accepted   Ambulance form on chart

## 2020-10-15 NOTE — PROGRESS NOTES
10/14/20 1949 Vital Signs Pulse (Heart Rate) (!) 115 Resp Rate (!) 32  
O2 Sat (%) (!) 89 % Level of Consciousness Alert Rapid response called. EKG and ABG done. Lasix ordered

## 2020-10-15 NOTE — PROGRESS NOTES
SLP contact and sign off Was informed via Palliative NP Paige Bolanos that pt and family have decided to pursue hospice. Therefore recommend comfort diet initiation with patient's favorite foods and flavors. Understand patient with limited PO interest, however pt should not be forced to eat and given autonomy regarding her diet. Further acute SLP services are no longer indicated, but please reconsult with any changes or if SLP can be of any further assistance to pt or her family. Thank you, Slade Blank [phone] A. Speech Language Pathology Student

## 2020-10-15 NOTE — PROGRESS NOTES
Problem: Falls - Risk of 
Goal: *Absence of Falls Description: Document Phoebe Mcmullen Fall Risk and appropriate interventions in the flowsheet. Outcome: Progressing Towards Goal 
Note: Fall Risk Interventions: 
Mobility Interventions: Communicate number of staff needed for ambulation/transfer Mentation Interventions: Adequate sleep, hydration, pain control, Door open when patient unattended, More frequent rounding, Reorient patient, Toileting rounds, Update white board Medication Interventions: Evaluate medications/consider consulting pharmacy Elimination Interventions: Call light in reach, Patient to call for help with toileting needs, Toileting schedule/hourly rounds History of Falls Interventions: Door open when patient unattended Problem: Patient Education: Go to Patient Education Activity Goal: Patient/Family Education Outcome: Progressing Towards Goal 
  
Problem: Pressure Injury - Risk of 
Goal: *Prevention of pressure injury Description: Document Josue Scale and appropriate interventions in the flowsheet. Outcome: Progressing Towards Goal 
Note: Pressure Injury Interventions: 
Sensory Interventions: Assess changes in LOC, Assess need for specialty bed, Check visual cues for pain, Float heels, Keep linens dry and wrinkle-free, Minimize linen layers, Pressure redistribution bed/mattress (bed type), Turn and reposition approx. every two hours (pillows and wedges if needed) Moisture Interventions: Absorbent underpads, Apply protective barrier, creams and emollients, Check for incontinence Q2 hours and as needed, Maintain skin hydration (lotion/cream), Minimize layers, Moisture barrier, Offer toileting Q_hr Activity Interventions: Pressure redistribution bed/mattress(bed type) Mobility Interventions: Float heels, Assess need for specialty bed, HOB 30 degrees or less, Pressure redistribution bed/mattress (bed type), Turn and reposition approx. every two hours(pillow and wedges) Nutrition Interventions: Document food/fluid/supplement intake, Offer support with meals,snacks and hydration Friction and Shear Interventions: Apply protective barrier, creams and emollients, Minimize layers, Lift sheet, HOB 30 degrees or less Problem: Patient Education: Go to Patient Education Activity Goal: Patient/Family Education Outcome: Progressing Towards Goal 
  
Problem: Patient Education: Go to Patient Education Activity Goal: Patient/Family Education Outcome: Progressing Towards Goal 
  
Problem: Patient Education: Go to Patient Education Activity Goal: Patient/Family Education Outcome: Progressing Towards Goal 
  
Problem: Urinary Tract Infection - Adult Goal: *Absence of infection signs and symptoms Outcome: Progressing Towards Goal 
  
Problem: Patient Education: Go to Patient Education Activity Goal: Patient/Family Education Outcome: Progressing Towards Goal 
  
Problem: Nutrition Deficit Goal: *Optimize nutritional status Outcome: Progressing Towards Goal 
  
Problem: Patient Education: Go to Patient Education Activity Goal: Patient/Family Education Outcome: Progressing Towards Goal

## 2020-10-15 NOTE — PROGRESS NOTES
Cardiology Consult/Progress Note 9/27/2020  4:55 PM  
TIA (transient ischemic attack) [G45.9];UTI (urinary tract infection) [N39.0] HPI: Oli Gilmore is a 80 y.o. female admitted for suspected TIA (transient ischemic attack) [G45.9];UTI (urinary tract infection) [N39.0]. She has history of cognitive dysfunction with dementia but until about a month ago was living on her own. She presented to the hospital about 3 weeks ago with suspected TIA although further work-up did not demonstrate any actual stroke. She presented again to the hospital few days ago with confusion and focal neurological deficits which were eventually felt to be from metabolic encephalopathy related to urinary tract infection. No obvious stroke was identified. She has been on blood thinners for at least 3 years although she is a poor historian. She was noted to be in atrial fibrillation both on her last visit as well as this time around but I presume this is previously known problem given that she has been on anticoagulation for 3 years. She has been requiring oxygen during this hospitalization and hence chest x-ray was performed which demonstrated possible blunting of bilateral costophrenic angles suggestive of pleural effusion. Echocardiogram was performed which demonstrated severe reduction in LV systolic function with all 4 chamber dilatation. Cardiology has been consulted to make recommendations regarding possible congestive heart failure. Interim: Worsened overnight needing O2 increase and was given Lasix, slightly better compared to last night but dyspneic. Investigation Telemetry: Atrial fibrillation, ECGs performed in the last 3 weeks have demonstrated heart rates in the range of 90 to about 120. ECG: ATRIAL fibrillation, initial EKG in September showed rapid ventricular rate 114 bpm most recent ECG shows heart rate between 90 to 100 bpm.  Nonspecific ST-T changes. Echocardiogram: Globally reduced LV systolic function of about 25 to 30%. All 4 chambers are dilated. Mild mitral and tricuspid regurgitation. Moderate pulmonary hypertension. Moderate size left pleural effusion noted. Assessment and PLAN 1. Heart failure with reduced ejection fraction likely secondary to 2 2. Recurrent persistent atrial fibrillation with intermittent rapid ventricular rate 3. Possible acute on chronic congestive heart failure due to systolic dysfunction 4. Chronic microvascular changes in the brain likely causing dementia 5. Dementia 6. UTI 7. Coronary disease to be ruled out 8. Manzanares's esophagus with dysphagia Ms. Claudette Aparicio presents to the hospital with suspected stroke and UTI. Her neurological deficits have mostly recovered. There was a concern regarding congestive heart failure as suggested by elevated proBNP and oxygen need. Clinically I feel that she has at least mild acute on chronic heart failure with reduced action fraction. Most likely etiology of cardiomyopathy is atrial fibrillation with rapid ventricular rates. I will recommend increasing the beta-blockers/metoprolol to a higher dose. We will  consider adding ACE inhibitor to her regimen in BP better and not going hospice. If in 3 to 4 months her LV systolic function does not improve and the family wants everything done we could potentially consider ischemic evaluation at that point time. Given that her JVD is elevated and she demonstrates pleural effusion and some hypoxemia, it will be reasonable to continue low-dose Lasix. If in fact she had TIA, that was mostly related to her underlying atrial fibrillation. Will recommend anticoagulation to continue unless fall risk is deemed to be high enough to avoid long-term anticoagulation. Alternatively in that situation left atrial appendage occlusion can be considered.  
  
However overall she appears to be too frail with multiple comorbidities meaning that her expected life survival may be rather limited. It may be reasonable to proceed with palliative care consult. Discussed with Dr. Esthela Lincoln 
[]    High complexity decision making was performed 
[]    Patient is at high-risk of decompensation with multiple organ involvement Review of Symptoms: 
Respiratory: No exertional dyspnea, orthopnea, PND, cough, hemoptysis, URI. Cardiovascular: No CP, palpitations, sweating, lightheadedness, dizziness, syncope, presyncope, lower extremity swelling. Otherwise no other pertinent positive or negative symptoms on ROS. Patient Active Problem List  
 Diagnosis Date Noted  Depression, unspecified depression type  Goals of care, counseling/discussion  Failure to thrive in adult  Feeding difficulties  Impaired mobility  UTI (urinary tract infection) 09/29/2020  TIA (transient ischemic attack) 09/27/2020 Kadie Montano MD 
Past Medical History:  
Diagnosis Date  CAD (coronary artery disease)  Gastrointestinal disorder History reviewed. No pertinent surgical history. Social History Socioeconomic History  Marital status:  Spouse name: Not on file  Number of children: Not on file  Years of education: Not on file  Highest education level: Not on file Tobacco Use  Smoking status: Never Smoker  Smokeless tobacco: Never Used Substance and Sexual Activity  Alcohol use: Not Currently  Drug use: Not Currently History reviewed. No pertinent family history. Current Facility-Administered Medications Medication Dose Route Frequency  metoprolol succinate (TOPROL-XL) XL tablet 50 mg  50 mg Oral DAILY  furosemide (LASIX) injection 20 mg  20 mg IntraVENous DAILY  morphine injection 1 mg  1 mg IntraVENous Q30MIN PRN  
 escitalopram oxalate (LEXAPRO) tablet 5 mg  5 mg Oral DAILY  sodium chloride (NS) flush 5-40 mL  5-40 mL IntraVENous Q8H  
  sodium chloride (NS) flush 5-40 mL  5-40 mL IntraVENous PRN  
 miconazole (MICOTIN) 2 % powder   Topical Q12H  
 melatonin tablet 3 mg  3 mg Oral QHS PRN  mirtazapine (REMERON) tablet 7.5 mg  7.5 mg Oral QHS  lactobac ac& pc-s.therm-b.anim (ISABELLE Q/RISAQUAD)  1 Cap Oral DAILY  ondansetron (ZOFRAN) injection 4 mg  4 mg IntraVENous Q6H PRN  
 nystatin (MYCOSTATIN) 100,000 unit/mL oral suspension 500,000 Units  500,000 Units Swish and Swallow QID  therapeutic multivitamin (THERAGRAN) tablet 1 Tab  1 Tab Oral DAILY  polyethylene glycol (MIRALAX) packet 17 g  17 g Oral DAILY  docusate sodium (COLACE) capsule 100 mg  100 mg Oral BID PRN  
 albuterol (PROVENTIL VENTOLIN) nebulizer solution 2.5 mg  2.5 mg Nebulization TID PRN  
 apixaban (ELIQUIS) tablet 2.5 mg  2.5 mg Oral BID  latanoprost (XALATAN) 0.005 % ophthalmic solution 1 Drop  1 Drop Both Eyes QHS  pravastatin (PRAVACHOL) tablet 10 mg  10 mg Oral QHS  timolol (TIMOPTIC) 0.5 % ophthalmic solution 1 Drop  1 Drop Both Eyes BID  acetaminophen (TYLENOL) tablet 650 mg  650 mg Oral Q4H PRN  
 aspirin chewable tablet 81 mg  81 mg Oral DAILY  labetaloL (NORMODYNE;TRANDATE) injection 5 mg  5 mg IntraVENous Q10MIN PRN Prior to Admission Medications Prescriptions Last Dose Informant Patient Reported? Taking?  
acetaminophen (TYLENOL) 500 mg tablet   Yes No  
Sig: Take 500 mg by mouth every eight (8) hours. albuterol (PROVENTIL HFA, VENTOLIN HFA, PROAIR HFA) 90 mcg/actuation inhaler   Yes No  
Sig: Take 1 Puff by inhalation three (3) times daily as needed for Shortness of Breath. amiodarone (CORDARONE) 200 mg tablet   Yes No  
Sig: Take 200 mg by mouth daily. apixaban (Eliquis) 2.5 mg tablet   Yes No  
Sig: Take 2.5 mg by mouth two (2) times a day. calcium carbonate (TUMS) 200 mg calcium (500 mg) chew   Yes No  
Sig: Take 1 Tab by mouth two (2) times a day.   
latanoprost (XALATAN) 0.005 % ophthalmic solution   Yes No  
 Sig: Administer 1 Drop to both eyes nightly. metoprolol tartrate (LOPRESSOR) 50 mg tablet   Yes No  
Sig: Take 50 mg by mouth every evening.  
multivitamins-minerals-lutein (MEN'S CENTRUM SILVER WITH LUTEIN) tab tablet   Yes No  
Sig: Take 1 Tab by mouth daily. pravastatin (PRAVACHOL) 10 mg tablet   Yes No  
Sig: Take 10 mg by mouth nightly. timolol (TIMOPTIC) 0.5 % ophthalmic solution   Yes No  
Sig: Administer 1 Drop to both eyes two (2) times a day. Facility-Administered Medications: None Allergies Allergen Reactions  Ambien [Zolpidem] Unknown (comments)  Fosamax [Alendronate] Unknown (comments) Labs:  
Recent Results (from the past 24 hour(s)) GLUCOSE, POC Collection Time: 10/14/20  6:26 PM  
Result Value Ref Range Glucose (POC) 132 (H) 65 - 100 mg/dL Performed by Wendy Grossman (CON) EKG, 12 LEAD, INITIAL Collection Time: 10/14/20  7:54 PM  
Result Value Ref Range Ventricular Rate 103 BPM  
 Atrial Rate 227 BPM  
 QRS Duration 88 ms Q-T Interval 418 ms QTC Calculation (Bezet) 547 ms Calculated R Axis 45 degrees Calculated T Axis -82 degrees Diagnosis Atrial fibrillation with rapid ventricular response Nonspecific ST and T wave abnormality , probably digitalis effect When compared with ECG of 03-OCT-2020 13:32, 
T wave inversion no longer evident in Anterior leads QT has lengthened Confirmed by Akhil Tucker MD, Krystina Dukes ([id number]) on 10/15/2020 8:55:32 AM 
  
POC EG7 Collection Time: 10/14/20  8:10 PM  
Result Value Ref Range Calcium, ionized (POC) 1.27 1.12 - 1.32 mmol/L  
 pH (POC) 7.45 7.35 - 7.45    
 pCO2 (POC) 32.8 (L) 35.0 - 45.0 MMHG  
 pO2 (POC) 67 (L) 80 - 100 MMHG  
 HCO3 (POC) 22.9 22 - 26 MMOL/L Base deficit (POC) 1 mmol/L  
 sO2 (POC) 94 92 - 97 % Site RIGHT RADIAL Device: NASAL CANNULA Flow rate (POC) 6 L/M Allens test (POC) YES  Specimen type (POC) ARTERIAL    
GLUCOSE, POC  
 Collection Time: 10/15/20 12:36 AM  
Result Value Ref Range Glucose (POC) 124 (H) 65 - 100 mg/dL Performed by Terald Ina  PCT   
CBC W/O DIFF Collection Time: 10/15/20 12:37 AM  
Result Value Ref Range WBC 11.6 (H) 3.6 - 11.0 K/uL  
 RBC 4.49 3.80 - 5.20 M/uL  
 HGB 14.9 11.5 - 16.0 g/dL HCT 46.0 35.0 - 47.0 % .4 (H) 80.0 - 99.0 FL  
 MCH 33.2 26.0 - 34.0 PG  
 MCHC 32.4 30.0 - 36.5 g/dL  
 RDW 18.2 (H) 11.5 - 14.5 % PLATELET 650 006 - 456 K/uL MPV 10.6 8.9 - 12.9 FL  
 NRBC 0.2 (H) 0  WBC ABSOLUTE NRBC 0.02 (H) 0.00 - 0.01 K/uL  
TROPONIN I Collection Time: 10/15/20 12:37 AM  
Result Value Ref Range Troponin-I, Qt. <0.05 <0.05 ng/mL GLUCOSE, POC Collection Time: 10/15/20  6:20 AM  
Result Value Ref Range Glucose (POC) 104 (H) 65 - 100 mg/dL Performed by Terald Dover  PCT No intake or output data in the 24 hours ending 10/15/20 1620 Patient Vitals for the past 24 hrs: 
 Temp Pulse Resp BP SpO2  
10/15/20 1245 97.6 °F (36.4 °C) (!) 111 22 129/83 90 % 10/15/20 0800 97.4 °F (36.3 °C) (!) 101 22 133/79 94 % 10/15/20 0414 97.7 °F (36.5 °C) (!) 103 18 106/77 90 % 10/14/20 2000  92 28 (!) 136/98 96 % 10/14/20 1949  (!) 115 (!) 32  (!) 89 % 10/14/20 1935 97.8 °F (36.6 °C) 99 (!) 34 133/78 90 % General:    Alert, can communicate, no distress. Psychiatric:    Normal Mood and affect Eye/ENT:      Pupils equal, No asymmetry, Conjunctival pink. Able to hear voice at normal amplitude. JVD noted with enhances hepatojugular reflex. Lungs:      Visibly symmetric chest expansion, No palpable tenderness. Reduce breath sounds are bilaterally. Heart[de-identified]    Irregular rate and rhythm, S1, S2 variable, flow murmur across aortic valve. No click, rub or gallop. No JVD, Normal palpable peripheral pulses. No cyanosis Abdomen:     Soft, non-tender. Bowel sounds normal. No masses,  No   
  organomegaly. Extremities:   Extremities normal, atraumatic, no edema. Neurologic:   CN II-XII grossly intact. No gross focal deficits Emmett Low MD 
10/15/2020  
11:37 AM  
 
[facility] Office:   [facility] Office: 
[address]     [address] [facility] [address]     [address] [place] [address] P: [id number]    P: [phone] F: [phone]    F: [phone]

## 2020-10-15 NOTE — PROGRESS NOTES
Palliative Medicine Consult [place]: [phone] ([id number]) Patient Name: Edmundo Govea YOB: 1927 Date of Initial Consult: 10/6/2020 Reason for Consult: Care decisions Requesting Provider: Dr. Paula Nieves Primary Care Physician: Kim Lopez MD 
 
 SUMMARY:  
Edmundo Govea is a 80 y.o. female with a past history of chronic atrial fib, CAD, and a recent right humerus fracture s/p ORIF, who was admitted on 9/27/2020 from home with a diagnosis of slurred speech concerning for TIA/CVA. She had multiple episodes of slurred speech at home and presented to the ED. She was admitted for further management. MRI of the brain showed chronic white matter disease with no acute infarction. Speech therapy was consulted and was concerned about the possibility of esophageal dysphagia with obstruction. GI was consulted and an EGD is planned for 10/8/20 to further evaluate. Current medical issues leading to Palliative Medicine involvement include: dysphagia, need to discuss care decisions. Social: She is a . She has three sons who are very involved in her care. She previously lived alone, but will now be moving in with her son Nancy Reece who lives locally. PALLIATIVE DIAGNOSES:  
1. Goals of care 2. Failure to thrive 3. Feeding difficulties 4. Anorexia 5. Dysphagia 6. Impaired mobility 7. Depression PLAN:  
1. Patient seen at bedside with son Nancy Reece present. She is more lucid today, but appears very weak. 2. We talked about what patient Roxana's wishes are. She says she is tired and wants to go home. She does not want to be pressured to eat or drink. We talked about the plan of going home with hospice and taking each day as it comes. Roxana says that this sounds like a good idea. Both Roxana and son Nancy Reece agree to consult hospice with the plan to get her home to [facility] as soon as possible. They will need a hospital bed. 3. We reviewed her current complaints/symptoms. She denies pain or nausea, but does report intermittent shortness of breath. She will need supplemental O2 for home use. 4. Hospice consult placed and care manager Osmany Barksdale and Dr. Tr Trujillo updated on plan. 5. [facility] (Dr[facility] Tr Trujillo) GOALS OF CARE / TREATMENT PREFERENCES:  
 
GOALS OF CARE: 
Patient/Health Care Proxy Stated Goals: Comfort TREATMENT PREFERENCES:  
Code Status: DNR Advance Care Planning: 
[x] The [facility] Interdisciplinary Team has updated the ACP Navigator with [place] and Patient Capacity Primary Decision Maker: Jaye Gale - Son - [phone] Secondary Decision Maker: Aguilar Galeana - Son - [phone] Advance Care Planning 9/28/2020 Patient's Healthcare Decision Maker is: Named in scanned ACP document Confirm Advance Directive Yes, on file Medical Interventions: Comfort measures Other Instructions:  
Artificially Administered Nutrition: No feeding tube Other: As far as possible, the palliative care team has discussed with patient / health care proxy about goals of care / treatment preferences for patient. HISTORY:  
 
History obtained from: chart, son CHIEF COMPLAINT:  
 
HPI/SUBJECTIVE: The patient is:  
[x] Verbal and participatory [] Non-participatory due to: She complains of shortness of breath that comes and goes. She says, \"I'm so tired. \" Clinical Pain Assessment (nonverbal scale for severity on nonverbal patients):  
Clinical Pain Assessment Severity: 0 Activity (Movement): Lying quietly, normal position Duration: for how long has pt been experiencing pain (e.g., 2 days, 1 month, years) Frequency: how often pain is an issue (e.g., several times per day, once every few days, constant) FUNCTIONAL ASSESSMENT:  
 
Palliative Performance Scale (PPS): PPS: 30 
 
 
 PSYCHOSOCIAL/SPIRITUAL SCREENING:  
 
Palliative IDT has assessed this patient for cultural preferences / practices and a referral made as appropriate to needs (Cultural Services, Patient Advocacy, Ethics, etc.) Any spiritual / Sabianist concerns: 
[] Yes /  [x] No 
 
Caregiver Burnout: 
[] Yes /  [x] No /  [] No Caregiver Present Anticipatory grief assessment:  
[x] Normal  / [] Maladaptive ESAS Anxiety: Anxiety: 1 ESAS Depression: Depression: 3 REVIEW OF SYSTEMS:  
 
Positive and pertinent negative findings in ROS are noted above in HPI. The following systems were [] reviewed / [x] unable to be reviewed as noted in HPI Other findings are noted below. Systems: constitutional, ears/nose/mouth/throat, respiratory, gastrointestinal, genitourinary, musculoskeletal, integumentary, neurologic, psychiatric, endocrine. Positive findings noted below. Modified ESAS Completed by: provider Fatigue: 10 Drowsiness: 5 Depression: 3 Pain: 0 Anxiety: 1 Nausea: 0 Anorexia: 10 Dyspnea: 2 Stool Occurrence(s): 1 PHYSICAL EXAM:  
 
From RN flowsheet: 
Wt Readings from Last 3 Encounters:  
10/13/20 130 lb (59 kg) 09/26/20 122 lb 12.7 oz (55.7 kg) 09/24/20 125 lb (56.7 kg) Blood pressure 129/83, pulse (!) 111, temperature 97.6 °F (36.4 °C), resp. rate 22, height 5' 2\" (1.575 m), weight 130 lb (59 kg), SpO2 90 %, not currently breastfeeding. Pain Scale 1: Numeric (0 - 10) Pain Intensity 1: 0 Pain Onset 1: acute Pain Location 1: Head 
Pain Orientation 1: Anterior Pain Description 1: Aching Pain Intervention(s) 1: Rest 
Last bowel movement, if known:  
 
Constitutional: frail, elderly, very weak ENMT: no nasal discharge, moist mucous membranes Respiratory: breathing not labored on O2 at 6L, symmetric Musculoskeletal: no deformity, no tenderness to palpation Skin: warm, dry, pale Neurologic: following commands, moving all extremities Psychiatric: calm  HISTORY:  
 Active Problems: 
  TIA (transient ischemic attack) (9/27/2020) UTI (urinary tract infection) (9/29/2020) Goals of care, counseling/discussion () Failure to thrive in adult () Feeding difficulties () Impaired mobility () Depression, unspecified depression type () Past Medical History:  
Diagnosis Date  CAD (coronary artery disease)  Gastrointestinal disorder History reviewed. No pertinent surgical history. History reviewed. No pertinent family history. History reviewed, no pertinent family history. Social History Tobacco Use  Smoking status: Never Smoker  Smokeless tobacco: Never Used Substance Use Topics  Alcohol use: Not Currently Allergies Allergen Reactions  Ambien [Zolpidem] Unknown (comments)  Fosamax [Alendronate] Unknown (comments) Current Facility-Administered Medications Medication Dose Route Frequency  metoprolol succinate (TOPROL-XL) XL tablet 50 mg  50 mg Oral DAILY  furosemide (LASIX) injection 20 mg  20 mg IntraVENous DAILY  morphine injection 1 mg  1 mg IntraVENous Q30MIN PRN  
 escitalopram oxalate (LEXAPRO) tablet 5 mg  5 mg Oral DAILY  sodium chloride (NS) flush 5-40 mL  5-40 mL IntraVENous Q8H  
 sodium chloride (NS) flush 5-40 mL  5-40 mL IntraVENous PRN  
 miconazole (MICOTIN) 2 % powder   Topical Q12H  
 melatonin tablet 3 mg  3 mg Oral QHS PRN  mirtazapine (REMERON) tablet 7.5 mg  7.5 mg Oral QHS  lactobac ac& pc-s.therm-b.anim (ISABELLE Q/RISAQUAD)  1 Cap Oral DAILY  ondansetron (ZOFRAN) injection 4 mg  4 mg IntraVENous Q6H PRN  
 nystatin (MYCOSTATIN) 100,000 unit/mL oral suspension 500,000 Units  500,000 Units Swish and Swallow QID  therapeutic multivitamin (THERAGRAN) tablet 1 Tab  1 Tab Oral DAILY  polyethylene glycol (MIRALAX) packet 17 g  17 g Oral DAILY  docusate sodium (COLACE) capsule 100 mg  100 mg Oral BID PRN  
  albuterol (PROVENTIL VENTOLIN) nebulizer solution 2.5 mg  2.5 mg Nebulization TID PRN  
 apixaban (ELIQUIS) tablet 2.5 mg  2.5 mg Oral BID  latanoprost (XALATAN) 0.005 % ophthalmic solution 1 Drop  1 Drop Both Eyes QHS  pravastatin (PRAVACHOL) tablet 10 mg  10 mg Oral QHS  timolol (TIMOPTIC) 0.5 % ophthalmic solution 1 Drop  1 Drop Both Eyes BID  acetaminophen (TYLENOL) tablet 650 mg  650 mg Oral Q4H PRN  
 aspirin chewable tablet 81 mg  81 mg Oral DAILY  labetaloL (NORMODYNE;TRANDATE) injection 5 mg  5 mg IntraVENous Q10MIN PRN  
 
 
 
 LAB AND IMAGING FINDINGS:  
 
Lab Results Component Value Date/Time WBC 11.6 (H) 10/15/2020 12:37 AM  
 HGB 14.9 10/15/2020 12:37 AM  
 PLATELET 457 86/95/5699 12:37 AM  
 
Lab Results Component Value Date/Time Sodium 136 10/14/2020 02:48 AM  
 Potassium 4.1 10/14/2020 02:48 AM  
 Chloride 101 10/14/2020 02:48 AM  
 CO2 25 10/14/2020 02:48 AM  
 BUN 13 10/14/2020 02:48 AM  
 Creatinine 0.75 10/14/2020 02:48 AM  
 Calcium 8.5 10/14/2020 02:48 AM  
 Magnesium 1.8 10/14/2020 02:48 AM  
 Phosphorus 3.0 10/14/2020 02:48 AM  
  
Lab Results Component Value Date/Time Alk. phosphatase 110 10/14/2020 02:48 AM  
 Protein, total 5.5 (L) 10/14/2020 02:48 AM  
 Albumin 2.3 (L) 10/14/2020 02:48 AM  
 Globulin 3.2 10/14/2020 02:48 AM  
 
Lab Results Component Value Date/Time INR 1.1 09/27/2020 05:22 PM  
 Prothrombin time 11.5 (H) 09/27/2020 05:22 PM  
  
No results found for: IRON, FE, TIBC, IBCT, PSAT, FERR No results found for: PH, PCO2, PO2 No components found for: [place] No results found for: CPK, CKMB Total time: 35 min Counseling / coordination time, spent as noted above: 30 min 
> 50% counseling / coordination?: yes Prolonged service was provided for  []30 min   []75 min in face to face time in the presence of the patient, spent as noted above. Time Start:  
Time End: Note: this can only be billed with 94421 (initial) or 05480 (follow up). If multiple start / stop times, list each separately.

## 2020-10-15 NOTE — DISCHARGE SUMMARY
Discharge Summary PATIENT ID: Alicia Klein MRN: [id number] YOB: 1927 DATE OF ADMISSION: 9/27/2020  4:55 PM   
DATE OF DISCHARGE: 10/15/2020 PRIMARY CARE PROVIDER: Shena Carrillo MD  
 
ATTENDING PHYSICIAN: Leandra Romano MD 
 
 
DISCHARGING PROVIDER: Breann Kelly MD   
To contact this individual call [phone] and ask the  to page. If unavailable ask to be transferred the Adult Hospitalist Department. CONSULTATIONS: IP CONSULT TO HOSPITALIST 
IP CONSULT TO NEUROLOGY 
IP CONSULT TO NEUROLOGY 
IP CONSULT TO GASTROENTEROLOGY 
IP CONSULT TO PALLIATIVE CARE - PROVIDER 
IP CONSULT TO PSYCHIATRY IP CONSULT TO CARDIOLOGY PROCEDURES/SURGERIES: Procedure(s): ESOPHAGOGASTRODUODENOSCOPY (EGD)   :- 
ESOPHAGOGASTRODUODENAL (EGD) [address] [address] COURSE:  
 
DISCHARGE DIAGNOSES / PLAN:   
 
Per H and P\"Roxana De León is a 80 y. o. female who presents with slurred speech and weakness.  Patient was seen in ER one day ago for stroke symptoms and worked up including had an MRI which was negative for acute stroke. Vevelyn Klinefelter was discharged home and she presents back with persistent slurred speech and weakness\". Acute on Chronic heart failure Acute hypoxemic resp failure 
-cannot tolerate GDMT 
-IV diuretics  
-discussed with patient's son -they decided to enroll patient in hospice care. Patient was transferred to inpatient hospice service for management of symptoms and then eventually home. #Acute metabolic encephalopathy #Dysphagia #Anorexia #UTI #Chronic atrial fib #Hypokalemai Xr Chest Pa Lat Result Date: 9/24/2020 History is shortness of breath. Comparison is to the chest x-ray of 8/31/2020. PA and lateral views of the chest demonstrate persistent cardiomegaly. There is improved aeration of the upper lung zones versus improved patient positioning.  There is a very small left pleural effusion now. No pneumonia or pneumothorax is identified. The patient has had interim ORIF of the proximal right humeral fracture with a jerry and multiple screws present. There is anatomic alignment at the fracture site. There is kyphosis and osteopenia of the thoracic spine. IMPRESSION: Cardiomegaly and small left pleural effusion. No pneumonia or pneumothorax. ORIF of right proximal humeral fracture. Please see full report. Xr Shoulder Rt 1v Result Date: 9/27/2020 INDICATION:   pain COMPARISON: September 2, 2020 FINDINGS: Single AP view of the right shoulder demonstrates [a internal fixation of the proximal humerus with intramedullary jerry and numerous fixation screws similar in overall alignment of prior study. Interval decrease in associated soft tissue swelling. No acute/new fracture. Acromioclavicular joint is intact. IMPRESSION: Recent ORIF right humerus as above, with no acute process. Xr Swallow Func Video Result Date: 10/9/2020 EXAM: XR SWALLOW FUNC VIDEO INDICATION: Dysphagia. Fluoroscopy dose (air kerma):   6.487 mGy   mGy. FINDINGS: Fluoroscopic assistance and image interpretation services were provided to speech therapy for performance of a modified barium swallow. The patient ingested liquid barium, barium mixed with applesauce and barium coated crackers in the lateral position with continuous fluoroscopy. With all consistencies there is normal oral processing and initiation of the swallow and a normal pharyngeal stripping wave minimal pooling within the vallecula. There is no nasopharyngeal reflux, penetration, aspiration or significant retention in the vallecula or piriform sinuses. A pill was attempted but failed. IMPRESSION: No evidence of aspiration or penetration. Minimal pooling within the vallecula. Mri Brain Wo Cont Result Date: 9/26/2020 EXAM: MRI BRAIN WO CONT INDICATION: Episode of slurred speech today COMPARISON: None. CONTRAST: None. TECHNIQUE:  Multiplanar multisequence acquisition without contrast of the brain. FINDINGS: Evaluation is limited by motion and artifact from dental hardware. . Ventricles are normal in size. Subcortical and deep white matter T2 hyperintense foci are present. No evidence of acute hemorrhage. Vague area of increased signal on diffusion-weighted imaging the central midbrain is likely artifactual given the extensive motion. No definite diffusion restriction is present. No mass effect or midline shift, or cerebral edema. There is no cerebellar tonsillar herniation. Expected arterial flow-voids are present. The paranasal sinuses, mastoid air cells, and middle ears are clear. The orbital contents are within normal limits. No significant osseous or scalp lesions are identified. IMPRESSION: Limitations as above. No evidence of acute ischemia. Mild to moderate chronic small vessel ischemic disease Ct Head Wo Cont Result Date: 10/3/2020 EXAM:  CT HEAD WO CONT INDICATION:   speech difficulty COMPARISON: CT head 9/27/2020, MRI brain 9/27/2020. TECHNIQUE: Unenhanced CT of the head was performed using 5 mm images. Brain and bone windows were generated. CT dose reduction was achieved through use of a standardized protocol tailored for this examination and automatic exposure control for dose modulation. FINDINGS: Unchanged generalized parenchymal volume loss with commensurate dilation of the sulci and ventricular system. Unchanged periventricular and deep white matter hypodensities, consistent with chronic microangiopathic ischemic changes. Basilar cisterns are patent. No midline shift. There is no evidence of acute infarct, hemorrhage, or extraaxial fluid collection. The paranasal sinuses, mastoid air cells, and middle ears are clear. The orbital contents are within normal limits with bilateral lens implants. There are no significant osseous or extracranial soft tissue lesions. IMPRESSION: 1. No evidence of acute intracranial abnormality. 2. Unchanged generalized parenchymal volume loss and chronic microvascular ischemic disease. Xr Chest [facility] Result Date: 10/14/2020 EXAM:  CR chest portable INDICATION: Hypoxia. Altered mental status. COMPARISON: 9/26/2020. TECHNIQUE: Portable AP upright chest view at 0307 hours FINDINGS: The cardiomediastinal contours are stable. There are small pleural effusions with scattered interstitial and airspace opacities, left greater than right. There is no pneumothorax. The bones and upper abdomen are stable. IMPRESSION: Small pleural effusions with scattered interstitial and airspace opacities, left greater than right, can be seen with pulmonary edema or atypical/viral infection. Xr Chest [facility] Result Date: 9/26/2020 EXAM: XR CHEST PORT INDICATION: CVA COMPARISON: 9/24/2020 FINDINGS: A portable AP radiograph of the chest was obtained at 0957 hours. There is no pneumothorax or pleural effusion. No consolidation or pulmonary edema is shown. Cardiac and mediastinal contours are stable. Internal fixation of the proximal right humerus is again shown. IMPRESSION: Stable chest x-ray appearance Xr Upper Gi W Kub/ Ba Swallow Result Date: 10/5/2020 UPPER GI SERIES: 10/5/2020 11:50 AM INDICATION: Dysphagia, nausea, vomiting. COMPARISON: CTA head and neck 9/27/2020. TECHNIQUE: Following a  radiograph of the abdomen, thick barium and effervescent crystals were given orally and multiple radiographs and fluoroscopic store images were obtained for a double contrast esophagram and upper GI series. Thin barium was subsequently administered and a single contrast esophagram was obtained. FINDINGS: On  radiography, there is mild biapical pulmonary fibrosis, and probable trace bilateral pleural effusions. The examination is significantly limited by patient immobility and inability to drink. Effervescent crystals prompted a gurgling cough, the patient aspirated thin barium, and the examination was terminated. Limited evaluation of the esophagus and stomach reveals no gross gastroesophageal abnormality. IMPRESSION: Aspiration. Air kerma (radiation dose): 27.2 mGy Cta Code Neuro Head And Neck W Cont Result Date: 9/27/2020 INDICATION: cva EXAMINATION:  CT ANGIOGRAPHY HEAD AND NECK COMPARISON: CTA September 26, 2020 and brain MRI September 26, 2020 TECHNIQUE:  Following the uneventful administration of  intravenous contrast, axial CT angiography of the head and neck was performed. 3D image postprocessing was performed. CT dose reduction was achieved through use of a standardized protocol tailored for this examination and automatic exposure control for dose modulation. Cerebral perfusion analysis using computed tomography with contrast administration, including post processing of parametric maps with the termination of cerebral blood flow, cerebral blood volume, and mean transit time. FINDINGS: CTA NECK Aortic Arch: Patent. Right Common Carotid Artery: Moderate calcified plaque at the bifurcation without significant stenosis. Right Internal Carotid Artery: Moderate calcified plaque along the posterior wall of the internal carotid artery origin and proximal cervical segment without significant stenosis. NASCET Right: 0-25%. Left Common Carotid Artery: Mild calcific plaque in the bifurcation without significant stenosis. Left Internal Carotid Artery: Moderate calcific plaque at the origin and proximal cervical segment without significant stenosis. NASCET Left: 0-25%. Carotid stenosis determined using NASCET criteria. Right Vertebral Artery: Patent. Left Vertebral Artery: Patent. Cervical Soft Tissues: 2.6 cm cystic nodule right thyroid lobe. Lung Apices: Small pleural effusions and mild interstitial edema partly visualized in the lung apices.  Biapical pleural and parenchymal scarring. Bones: No destructive bone lesion. Additional Comments: N/A. CTA HEAD Posterior Circulation: No flow limiting stenosis or occlusion. Anterior Circulation: No flow limiting stenosis or occlusion. Additional Comments: Probable small infundibulum left supraclinoid internal carotid artery at the expected location of the posterior communicating artery, unchanged. CT PERFUSION: There are no regional areas of elevated Tmax, decreased cerebral blood flow or blood volume. RCBF < 30% = 0 cc. Tmax > 6 seconds = 0 cc. Mismatch volume = 0 cc. IMPRESSION: No acute large vessel occlusion. No change since MRI and CT examination of yesterday. Cta Code Neuro Head And Neck W Cont Result Date: 9/26/2020 INDICATION: slurred speech EXAMINATION:  CT ANGIOGRAPHY HEAD AND NECK COMPARISON: None TECHNIQUE:  Following the uneventful administration of , axial CT angiography of the head and neck was performed. Delayed axial images through the head were also obtained. Coronal and sagittal reconstructions were obtained. Manual postprocessing of images was performed. 3-D  Sagittal maximal intensity projection images were obtained. 3-D Coronal maximal intensity projections were obtained. CT dose reduction was achieved through use of a standardized protocol tailored for this examination and automatic exposure control for dose modulation. CT brain perfusion was performed with generation of hemodynamic maps of multiple parameters, including cerebral blood flow, cerebral blood volume, and MTT (mean transit time). . FINDINGS: CTA NECK: Aortic Arch: No significant abnormality. Right Common Carotid Artery: No significant abnormality. Right Internal Carotid Artery: No significant abnormality. NASCET Right: 0-25%. Left Common Carotid Artery: No significant abnormality. Left Internal Carotid Artery: No significant abnormality. NASCET Left: 0-25%. . Carotid stenosis determined using NASCET criteria. Right Vertebral Artery: No significant abnormality. Left Vertebral Artery: No significant abnormality. Cervical Soft Tissues: Large hypodensity in the right thyroid gland. . Lung Apices: Biapical pleural thickening. Bones: Multilevel degenerative changes in cervical spine. No destructive bony lesions. . Additional Comments: N/A. CTA HEAD: Posterior Circulation: There is mild to moderate stenosis of the right P1 segment. No sizable posterior communicating arteries are present. . Anterior Circulation: No flow limiting stenosis or occlusion. Additional Comments: No evidence of aneurysm or vascular malformation. Mild to moderate chronic small vessel ischemic disease. There are no regional areas of elevated Tmax, decreased cerebral blood flow or blood volume. rCBF < 30% = 0 cc. Tmax > 6 seconds = 0 cc. Impression: 1. No acute process. No arterial thrombosis/occlusion, dissection, or flow-limiting stenosis. 2.  No definite perfusion abnormalities are identified. 3.  Mild to moderate chronic small vessel ischemic disease. 4.  Large hypodensity in the right thyroid gland which may represent a cyst. Consider nonemergent ultrasound 5. Degenerative changes in cervical spine Ct Code Neuro Head Wo Contrast 
 
Result Date: 9/27/2020 EXAM: CT CODE NEURO HEAD WO CONTRAST INDICATION: Code Stroke slurred speech  onset weakness COMPARISON: September 26. CONTRAST: None. TECHNIQUE: Unenhanced CT of the head was performed using 5 mm images. Brain and bone windows were generated. Coronal and sagittal reformats. CT dose reduction was achieved through use of a standardized protocol tailored for this examination and automatic exposure control for dose modulation. FINDINGS: There is atrophy and nonspecific white matter changes stable. There is no extra-axial fluid collection hemorrhage shift or masses. IMPRESSION: No acute changes. Ct Code Neuro Head Wo Contrast 
 
Result Date: 9/26/2020 EXAM: CT CODE NEURO HEAD WO CONTRAST INDICATION: slurred speech COMPARISON: 8/31/2020. CONTRAST: None. TECHNIQUE: Unenhanced CT of the head was performed using 5 mm images. Brain and bone windows were generated. Coronal and sagittal reformats. CT dose reduction was achieved through use of a standardized protocol tailored for this examination and automatic exposure control for dose modulation. FINDINGS: The ventricles and sulci are normal in size, shape and configuration. . Mild subcortical deep white matter hypodensities. . There is no intracranial hemorrhage, extra-axial collection, or mass effect. The basilar cisterns are open. No CT evidence of acute infarct. The bone windows demonstrate no abnormalities. The visualized portions of the paranasal sinuses and mastoid air cells are clear. IMPRESSION: Mild chronic small vessel ischemic disease. No acute intracranial abnormality. Findings were discussed with emergency department at the time of dictation. Ct Code Neuro Perf W Cbf Result Date: 9/27/2020 INDICATION: cva EXAMINATION:  CT ANGIOGRAPHY HEAD AND NECK COMPARISON: CTA September 26, 2020 and brain MRI September 26, 2020 TECHNIQUE:  Following the uneventful administration of  intravenous contrast, axial CT angiography of the head and neck was performed. 3D image postprocessing was performed. CT dose reduction was achieved through use of a standardized protocol tailored for this examination and automatic exposure control for dose modulation. Cerebral perfusion analysis using computed tomography with contrast administration, including post processing of parametric maps with the termination of cerebral blood flow, cerebral blood volume, and mean transit time. FINDINGS: CTA NECK Aortic Arch: Patent. Right Common Carotid Artery: Moderate calcified plaque at the bifurcation without significant stenosis.  Right Internal Carotid Artery: Moderate calcified plaque along the posterior wall of the internal carotid artery origin and proximal cervical segment without significant stenosis. NASCET Right: 0-25%. Left Common Carotid Artery: Mild calcific plaque in the bifurcation without significant stenosis. Left Internal Carotid Artery: Moderate calcific plaque at the origin and proximal cervical segment without significant stenosis. NASCET Left: 0-25%. Carotid stenosis determined using NASCET criteria. Right Vertebral Artery: Patent. Left Vertebral Artery: Patent. Cervical Soft Tissues: 2.6 cm cystic nodule right thyroid lobe. Lung Apices: Small pleural effusions and mild interstitial edema partly visualized in the lung apices. Biapical pleural and parenchymal scarring. Bones: No destructive bone lesion. Additional Comments: N/A. CTA HEAD Posterior Circulation: No flow limiting stenosis or occlusion. Anterior Circulation: No flow limiting stenosis or occlusion. Additional Comments: Probable small infundibulum left supraclinoid internal carotid artery at the expected location of the posterior communicating artery, unchanged. CT PERFUSION: There are no regional areas of elevated Tmax, decreased cerebral blood flow or blood volume. RCBF < 30% = 0 cc. Tmax > 6 seconds = 0 cc. Mismatch volume = 0 cc. IMPRESSION: No acute large vessel occlusion. No change since MRI and CT examination of yesterday. Ct Code Neuro Perf W Cbf Result Date: 9/26/2020 INDICATION: slurred speech EXAMINATION:  CT ANGIOGRAPHY HEAD AND NECK COMPARISON: None TECHNIQUE:  Following the uneventful administration of , axial CT angiography of the head and neck was performed. Delayed axial images through the head were also obtained. Coronal and sagittal reconstructions were obtained. Manual postprocessing of images was performed. 3-D  Sagittal maximal intensity projection images were obtained. 3-D Coronal maximal intensity projections were obtained.   CT dose reduction was achieved through use of a standardized protocol tailored for this examination and automatic exposure control for dose modulation. CT brain perfusion was performed with generation of hemodynamic maps of multiple parameters, including cerebral blood flow, cerebral blood volume, and MTT (mean transit time). . FINDINGS: CTA NECK: Aortic Arch: No significant abnormality. Right Common Carotid Artery: No significant abnormality. Right Internal Carotid Artery: No significant abnormality. NASCET Right: 0-25%. Left Common Carotid Artery: No significant abnormality. Left Internal Carotid Artery: No significant abnormality. NASCET Left: 0-25%. . Carotid stenosis determined using NASCET criteria. Right Vertebral Artery: No significant abnormality. Left Vertebral Artery: No significant abnormality. Cervical Soft Tissues: Large hypodensity in the right thyroid gland. . Lung Apices: Biapical pleural thickening. Bones: Multilevel degenerative changes in cervical spine. No destructive bony lesions. . Additional Comments: N/A. CTA HEAD: Posterior Circulation: There is mild to moderate stenosis of the right P1 segment. No sizable posterior communicating arteries are present. . Anterior Circulation: No flow limiting stenosis or occlusion. Additional Comments: No evidence of aneurysm or vascular malformation. Mild to moderate chronic small vessel ischemic disease. There are no regional areas of elevated Tmax, decreased cerebral blood flow or blood volume. rCBF < 30% = 0 cc. Tmax > 6 seconds = 0 cc. Impression: 1. No acute process. No arterial thrombosis/occlusion, dissection, or flow-limiting stenosis. 2.  No definite perfusion abnormalities are identified. 3.  Mild to moderate chronic small vessel ischemic disease. 4.  Large hypodensity in the right thyroid gland which may represent a cyst. Consider nonemergent ultrasound 5. Degenerative changes in cervical spine Mri Code Neuro Brain Wo Cont Result Date: 9/27/2020 INDICATION:   cva EXAMINATION:  MRI BRAIN WO CONTRAST COMPARISON:  CT earlier today TECHNIQUE:  Multiplanar multisequence acquisition without contrast of the brain. FINDINGS:  Ventricles:  Midline, no hydrocephalus. Brain Parenchyma/Brainstem:  Mild chronic white matter disease throughout the supratentorial brain. Mild generalized atrophy. No acute infarction. Intracranial Hemorrhage:  None. Basal Cisterns:  Normal. Flow Voids:  Normal. Additional Comments:  N/A. IMPRESSION: Chronic white matter disease with no acute infarction. Result status: Final result · Saline contrast was given to evaluate for intracardiac shunt. there is no evidence of right to left shunt. · LV: Estimated LVEF is 25 - 30%. Visually measured ejection fraction. Normal cavity size. Upper normal wall thickness. Moderate-to-severely reduced systolic function. Wall motion: normal. 
· LA: Moderately dilated left atrium. · RV: Mildly dilated right ventricle. Mildly reduced systolic function. · RA: Moderately dilated right atrium. · AV: Aortic valve leaflet calcification present without reduced excursion. Mild aortic valve regurgitation is present. · MV: Mild mitral valve regurgitation is present. · TV: Mild to moderate tricuspid valve regurgitation is present. · PV: Mild pulmonic valve regurgitation is present. · PA: Moderate pulmonary hypertension. Pulmonary arterial systolic pressure is 56 mmHg. PENDING TEST RESULTS:  
At the time of discharge the following test results are still pending: none FOLLOW UP APPOINTMENTS:   
Follow-up Information Follow up With Specialties Details Why Contact Info Suze Mcgraw MD Internal Medicine   [address] [place], [place] [address] [phone] DISCHARGE MEDICATIONS: 
Discharge Medication List as of 10/15/2020  6:07 PM  
  
 
 
 
NOTIFY YOUR PHYSICIAN FOR ANY OF THE FOLLOWING:  
 Fever over 101 degrees for 24 hours. Chest pain, shortness of breath, fever, chills, nausea, vomiting, diarrhea, change in mentation, falling, weakness, bleeding. Severe pain or pain not relieved by medications. Or, any other signs or symptoms that you may have questions about. DISPOSITION: 
 Home With: 
 OT  PT  HH  RN  
  
 Long term SNF/Inpatient Rehab Independent/assisted living  
x Hospice Other:  
 
 
PATIENT CONDITION AT DISCHARGE:  
 
Functional status  
x Poor Deconditioned Independent Cognition Verlan Comer Forgetful   
x Dementia Catheters/lines (plus indication) Murphy PICC   
 PEG   
x None Code status Full code   
x DNR PHYSICAL EXAMINATION AT DISCHARGE: 
Gen:  elderly patient HEENT:  EOMI,hard of hearing Resp: decreased breath sounds at bases Card:   normal S1, S2 Abd:  Soft, non-tender, non-distended, Neuro:  alert,oriented X 1,moves extremities Psych:  intermittent agitation  
 
 
 
CHRONIC MEDICAL DIAGNOSES: 
Problem List as of 10/15/2020 Never Reviewed Codes Class Noted - Resolved Depression, unspecified depression type ICD-10-CM: F32.9 ICD-9-CM: 311  Unknown - Present Goals of care, counseling/discussion ICD-10-CM: Z71.89 ICD-9-CM: V65.49  Unknown - Present Failure to thrive in adult ICD-10-CM: R62.7 ICD-9-CM: 783.7  Unknown - Present Feeding difficulties ICD-10-CM: R63.3 ICD-9-CM: 497. 3  Unknown - Present Impaired mobility ICD-10-CM: Z74.09 
ICD-9-CM: 799.89  Unknown - Present UTI (urinary tract infection) ICD-10-CM: N39.0 ICD-9-CM: 599.0  9/29/2020 - Present  
   
 TIA (transient ischemic attack) ICD-10-CM: G45.9 ICD-9-CM: 435.9  9/27/2020 - Present 50 minutes were spent with the patient on counseling and coordination of care Signed: Terril Cranker, MD 
10/15/2020 
6:50 PM

## 2020-10-15 NOTE — PROGRESS NOTES
S: RRT Called for   patient because of  Worsening respiratory distress, patient is presently being evaluated for acute on chronic systolic CHF, not on diuretic. She is on Eliquis for anticoagulation for her A-fib. Patent denies chest pain, not a good historian because of her mental status. O: HEENT: Normocephalic, atraumatic Chest: bilateral basal crackles Heart: normal S1 and S2 irregularly irregular Abdomen: Soft, non tender, normal BS  
CNS: Alert, not oriented Ext: Edema 2+, pulses 2+ A/P: Acute on Chronic Systolic CHF Will start patient on Lasix, check cardiac markers, ABG, EKG Will continue to monitor

## 2020-10-15 NOTE — DISCHARGE INSTRUCTIONS
Discharge SNF/Rehab Instructions/LTAC       PATIENT ID: Herbert You  MRN: [id number]   YOB: 1927    DATE OF ADMISSION: 9/27/2020  4:55 PM    DATE OF DISCHARGE: 9/29/2020    PRIMARY CARE PROVIDER: Alexx Allen MD       ATTENDING PHYSICIAN: Tony Hodges MD  DISCHARGING PROVIDER: Bree Hastings MD     To contact this individual call [phone] and ask the  to page. If unavailable ask to be transferred the Adult Hospitalist Department. CONSULTATIONS: IP CONSULT TO HOSPITALIST  IP CONSULT TO NEUROLOGY  IP CONSULT TO NEUROLOGY    PROCEDURES/SURGERIES: * No surgery found *    ADMITTING DIAGNOSES & HOSPITAL COURSE:     Roxana De León is a 80 y. o. female who presents with slurred speech and weakness.  Patient was seen in ER one day ago for stroke symptoms and worked up including had an MRI which was negative for acute stroke. Cody Garland was discharged home and she presents back with persistent slurred speech and weakness. There was no report of facial asymmetry or droop, focal hand or leg weakness. Monika Friday is more like generalized weakness. While in the ER blood sugar per EMS was 177. Vital signs in ED showed temperature 98.3 pulse rate of 100 respiratory 23 pressure 114/71 O2 sats 97% on room air  When she was in the ED, EKG showed A. fib rate controlled  She had a CT scan code neuro which was negative acute stroke or large vessel occlusion  She was deemed not a tpa candidate per tele neuro.   An MRI of the brain has been ordered and was pending   The patient was in the room with her son. Cody Garland feels like nothing is wrong with her. Cody Garland had a recent right shoulder fracture for which she had been to rehab. Cody Garland reports that the right shoulder still hurtful and also she is having nausea associated with this.     Slurred speech and generalized weakness possible due to TIA  -her symptoms resolved in 10 minutes  -on Eliquis, aspirin and pravachol  -CT head no acute change  -CTA head/CT perfusion no acute large vessel occlusion  -MRI chronic white matter disease with no acute infarction. -lipid panel is normal  -Echo pending  -troponin <0.05  -Seen by Neurology and cleared her for    Thyroid cyst and abnormal thyroid function  -2.6 cm cystic nodule right thyroid lobe on CTA head, not sure clinical significance   -TSH elevated, free T4 normal, patient on amiodarone   -repeat thyroid function in 4 weeks   UTI POA  -on Ceftriaxone   -UA wbc >100, LE moderate, nitrites positive and bacteria 3+  -afebrile, no leukocytosis  -urine cx grow gram negative jerry and identification is pending  Chronic A Fib, rate controlled  -EKG atrial fibrillation vent rate 102 non specific st t wave  -continue on amiodarone, metoprolol and eliquis    Hyperglycemia   -A1c 5.6  -no further intervention    Hx of right humerus fracture s/p ORIF  -has right shoulder pain  -continue prn tylenol     COVID 19 test on 9/29        Code status: DNR  DVT prophylaxis: Eliquis    DISCHARGE DIAGNOSES / PLAN:      Slurred speech and generalized weakness possible due to TIA  -her symptoms resolved in 10 minutes  -on Eliquis, aspirin and pravachol  -CT head no acute change  -CTA head/CT perfusion no acute large vessel occlusion  -MRI chronic white matter disease with no acute infarction.   -lipid panel is normal  -Echo pending  -troponin <0.05  -Seen by Neurology and cleared her for    Thyroid cyst and abnormal thyroid function  -2.6 cm cystic nodule right thyroid lobe on CTA head, not sure clinical significance   -TSH elevated, free T4 normal, patient on amiodarone   -repeat thyroid function in 4 weeks   UTI POA  -on Ceftriaxone   -UA wbc >100, LE moderate, nitrites positive and bacteria 3+  -afebrile, no leukocytosis  -urine cx grow gram negative jerry and identification is pending  Chronic A Fib, rate controlled  -EKG atrial fibrillation vent rate 102 non specific st t wave  -continue on amiodarone, metoprolol and eliquis    Hyperglycemia   -A1c 5.6  -no further intervention    Hx of right humerus fracture s/p ORIF  -has right shoulder pain  -continue prn tylenol    PENDING TEST RESULTS:   At the time of discharge the following test results are still pending: None    FOLLOW UP APPOINTMENTS:    Follow-up Information    None          ADDITIONAL CARE RECOMMENDATIONS: ***    DIET: {diet:78005}    TUBE FEEDING INSTRUCTIONS: None    OXYGEN / BiPAP SETTINGS: None    ACTIVITY: Activity as tolerated    WOUND CARE: None    EQUIPMENT needed: None      DISCHARGE MEDICATIONS:   See Medication Reconciliation Form      NOTIFY YOUR PHYSICIAN FOR ANY OF THE FOLLOWING:   Fever over 101 degrees for 24 hours. Chest pain, shortness of breath, fever, chills, nausea, vomiting, diarrhea, change in mentation, falling, weakness, bleeding. Severe pain or pain not relieved by medications. Or, any other signs or symptoms that you may have questions about.     DISPOSITION:    Home With:   OT  PT  HH  RN      x SNF/Inpatient Rehab/LTAC    Independent/assisted living    Hospice    Other:       PATIENT CONDITION AT DISCHARGE:     Functional status    Poor    x Deconditioned     Independent      Cognition   x  Lucid     Forgetful     Dementia      Catheters/lines (plus indication)    Murphy     PICC     PEG    x None      Code status     Full code    x DNR      PHYSICAL EXAMINATION AT DISCHARGE:   Refer to Progress Note       CHRONIC MEDICAL DIAGNOSES:  Problem List as of 9/29/2020 Never Reviewed          Codes Class Noted - Resolved    UTI (urinary tract infection) ICD-10-CM: N39.0  ICD-9-CM: 599.0  9/29/2020 - Present        TIA (transient ischemic attack) ICD-10-CM: G45.9  ICD-9-CM: 435.9  9/27/2020 - Present                CDMP Checked:   Yes x     PROBLEM LIST Updated:  Yes x         Signed:   Andrey Bates MD  9/29/2020  9:37 PM

## 2020-10-15 NOTE — PROGRESS NOTES
Attempted medication administration at [phone]. Patient refusing to swallow pills. Attempted using applesauce and orange juice, however patient continued to spit medication out into bed linen. Nursing instructor at the bedside during attempted medication administration.

## 2020-10-16 NOTE — PROGRESS NOTES
Problem: Dyspnea Due to End of Life  Goal: Demonstrate understanding of and ability to manage respiratory symptoms at end of life  Outcome: Progressing Towards Goal     Problem: Communication Deficit  Goal: Effectively communicate symptoms, needs, and concerns  Outcome: Progressing Towards Goal     Problem: Hospice Orientation  Goal: Demonstrate understanding of hospice philosophy, plan of care, and home hospice program  Description: The patient/family/caregiver will demonstrate understanding of hospice philosophy, plan of care and the home hospice program as evidenced by participation in meeting the patient's psychosocial, spiritual, medical, and physical needs inclusive of medical supplies/equipment focusing on symptoms. Outcome: Progressing Towards Goal     Problem: Anxiety/Agitation  Goal: Verbalize and demonstrate ability to manage anxiety  Description: The patient/family/caregiver will verbalize and demonstrate ability to manage the patient's anxiety throughout hospice care. Outcome: Progressing Towards Goal     Problem: End of Life Process  Goal: Demonstrate understanding of end of life processes  Description: Patient/caregiver will understand end of life processes.   Outcome: Progressing Towards Goal

## 2020-10-16 NOTE — HOSPICE
This was an initial visit to assess needs and offer spiritual care support. Upon my arrival patient was alone in room in bed. However, she was talking and appeared upset. She was calling for Darnell Reyes. I called her name and introduced myself. I inquired as to how she was doing and she said, I'm scared. She noted that Darnell Reyes would not allow anything to happen to her. Assured her she was in a safe place and we would keep her safe. Asked her if she felt God would keep her safe and she said, yes. Asked if it would be helpful to offer a prayer and she said, yes. I offered prayer and she went to sleep. I set with her for about ten minutes. She was still asleep when I left the room. Thanks for the opportunity to  to this patient. Herson Todd out to home team, Sascha Novak, will follow up with patient and family. Reno Santiago

## 2020-10-16 NOTE — PROGRESS NOTES
[facility] arrived to transport patient to Son (Keyon's) home. Hospice RN in communication with [place]. Medications picked up from pharmacy per NORTH Farfan and given to [facility]  in tied white plastic bag. Patient discharged without occurrence.

## 2020-10-16 NOTE — DISCHARGE SUMMARY
Hospice Discharge Summary    [facility] [facility] [facility]  Good Help to Those in Need        Date of Admission: 10/15/2020  Date of Discharge: 10/16/20    Mary Franks is a 80y.o. year old who was admitted to [facility] at [facility] with a Hospice diagnosis of End stage heart failure ([facility] [address] [I50.84]. The patient was discharged to home for ongoing Hospice care. Patient be discharged home for ongoing hospice care. Discharge medications  1. Comfort order meds have been ordered to include oral Dilaudid 0.5 mg, Haldol 1 mg, Tylenol, Levsin.   Patient will be admitted by the home hospice team this afternoon

## 2020-10-16 NOTE — H&P
[facility] Help to Those in Need  [phone]    Patient Name: Morgan North  YOB: 1927    Date of Provider Hospice Visit: 10/16/20    Level of Care:   [x] General Inpatient (GIP)    [] Routine   [] Respite    Current Location of Care:  [x] [facility] [] [facility] [] [address] [] [facility] [] [facility], patient referred from:  [] [facility] [] [facility] [] [address] [] [facility] [] Home [] Other:     Date of [address] Admission: 10/15/20  Hospice Medical Director at time of admission: [address] Diagnosis: End-stage heart failure  Diagnoses RELATED to the terminal prognosis: Chronic A. fib, coronary disease, recent right humerus fracture with ORIF, TIA  Other Diagnoses:      Blossom Peoples is a 80y.o. year old who was admitted to [facility]. Patient is a 80-year-old female mated to the hospital at [facility] on 9/27 with complaints of slurred speech and weakness. Cinromide it was negative for an acute stroke. Patient also with a right shoulder fracture. Ultimately repeat MRI showed chronic white matter disease with no acute infarction. Patient with evidence of esophageal dysmotility/dysphagia. Echocardiogram showing globally reduced EF to 25% with all 4 chambers dilated. Also is having issues with persistent A. fib and respiratory distress. EGD consistent with Manzanares's esophagus and patient with underlying dementia. Patient continued to show evidence of decline with increasing shortness of breath. Attempts at low-dose morphine at 1 mg caused increasing hallucinations. Because of her decline and concern whether she would be able to tolerate transition home, patient was admitted GIP at least for overnight on 10/15 to see if her symptoms were to worsen.     The patient's principle diagnosis has resulted in shortness of breath  Refer to LCD     Functionally, the patient's Karnofsky and/or Palliative Performance Scale has declined over a period of weeks to months and is estimated at 20-30 the patient is dependent on the following ADLs: All    Objective information that support this patients limited prognosis includes:   Echo with an EF of 25%    The patient/family chose comfort measures with the support of Hospice. HOSPICE DIAGNOSES   Active Symptoms:  1. Shortness of breath  2. Restlessness  3. Acute systolic heart failure  4. Recent right humeral fracture  5. 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 End-stage heart failure     PLAN   1. Patient was admitted to [facility] care secondary to her shortness of breath/decline. Once again patient did not tolerate morphine and it was unclear whether she was going to tolerate any opioid and/or be able to tolerate sublingual/oral medication. By the morning of 10/16, patient had not required any type of opioids and appeared stable enough to be discharged home  2. We had ordered IV Dilaudid for as needed use but no requirements. She only needed 1 dose of Haldol 1 mg  3. Patient be transitioned home with ongoing hospice support    4.  and SW to support family needs  5. Disposition: Home with hospice  6. Hospice Plan of care was reviewed in detail and agree with current plan of care    Prognosis estimated based on 10/16/20 clinical assessment is:   [] Hours to Days    [x] Days to Weeks    [] Other:    Communicated plan of care with: Hospice Case Manager; Hospice IDT; Care Team     GOALS OF CARE     Patient/Medical POA stated Goal of Care: Hospice care    [x] I have reviewed and/or updated ACP information in the Advance Care Planning Navigator. This information is available in the [address] link in the patient's chart header.     Primary Decision [facility] ([phone]):   Primary Decision Maker: Sherine Godwin Son - [phone]    Secondary Decision Maker: Kenney Dawn Son - [phone]    Resuscitation Status: DNR  If DNR is there a Durable DNR on file? : [x] Yes [] No (If no, complete Durable DNR)    HISTORY     History obtained from: Chart, hospice liaison    CHIEF COMPLAINT: Shortness of breath  The patient is:   [x] Verbal  [] Nonverbal  [] Unresponsive    HPI/SUBJECTIVE: Patient remains confused. He is a little bit restless but the inpatient team is attempting to dress her for her right home. Remains on oxygen.   Denies any pain       REVIEW OF SYSTEMS     The following systems were: [] reviewed  [x] unable to be reviewed    Positive ROS include:  Constitutional: fatigue, weakness, in pain, short of breath  Ears/nose/mouth/throat: increased airway secretions  Respiratory:shortness of breath, wheezing  Gastrointestinal:poor appetite, nausea, vomiting, abdominal pain, constipation, diarrhea  Musculoskeletal:pain, deformities, swelling legs  Neurologic:confusion, hallucinations, weakness  Psychiatric:anxiety, feeling depressed, poor sleep  Endocrine:     Adult Non-Verbal Pain Assessment Score: Denies pain    Face  [] 0   No particular expression or smile  [] 1   Occasional grimace, tearing, frowning, wrinkled forehead  [] 2   Frequent grimace, tearing, frowning, wrinkled forehead    Activity (movement)  [] 0   Lying quietly, normal position  [] 1   Seeking attention through movement or slow, cautious movement  [] 2   Restless, excessive activity and/or withdrawal reflexes    Guarding  [] 0   Lying quietly, no positioning of hands over areas of body  [] 1   Splinting areas of the body, tense  [] 2   Rigid, stiff    Physiology (vital signs)  [] 0   Stable vital signs  [] 1   Change in any of the following: SBP > 20mm Hg; HR > 20/minute  [] 2   Change in any of the following: SBP > 30mm Hg; HR > 25/minute    Respiratory  [] 0   Baseline RR/SpO2, compliant with ventilator  [] 1   RR > 10 above baseline, or 5% drop SpO2, mild asynchrony with ventilator  [] 2   RR > 20 above baseline, or 10% drop SpO2, asynchrony with ventilator     FUNCTIONAL ASSESSMENT     Palliative Performance Scale (PPS): 20-30       PSYCHOSOCIAL/SPIRITUAL ASSESSMENT     Active Problems:    End stage heart failure ([facility] [address] (10/15/2020)      Past Medical History:   Diagnosis Date    CAD (coronary artery disease)     Gastrointestinal disorder       No past surgical history on file. Social History     Tobacco Use    Smoking status: Never Smoker    Smokeless tobacco: Never Used   Substance Use Topics    Alcohol use: Not Currently     No family history on file.    Allergies   Allergen Reactions    Ambien [Zolpidem] Unknown (comments)    Fosamax [Alendronate] Unknown (comments)      Current Facility-Administered Medications   Medication Dose Route Frequency    haloperidol lactate (HALDOL) injection 1 mg  1 mg IntraVENous Q1H PRN    HYDROmorphone (PF) (DILAUDID) injection 0.2 mg  0.2 mg IntraVENous Q30MIN PRN    acetaminophen (TYLENOL) solution 650 mg  650 mg Oral Q4H PRN    Or    acetaminophen (TYLENOL) suppository 650 mg  650 mg Rectal Q4H PRN    hyoscyamine SL (LEVSIN/SL) tablet 0.125 mg  0.125 mg SubLINGual Q4H PRN    bisacodyL (DULCOLAX) suppository 10 mg  10 mg Rectal DAILY PRN        PHYSICAL EXAM     Wt Readings from Last 3 Encounters:   10/13/20 59 kg (130 lb)   09/26/20 55.7 kg (122 lb 12.7 oz)   09/24/20 56.7 kg (125 lb)       Visit Vitals  BP (!) 162/120 (BP 1 Location: Right arm, BP Patient Position: At rest)   Pulse (!) 119   Temp 98.1 °F (36.7 °C)   Resp 20   SpO2 (!) 75%       Supplemental O2  [x] Yes  [] NO  Last bowel movement:     Currently this patient has:  [x] Peripheral IV [] PICC  [] PORT [] ICD    [] Murphy Catheter [] NG Tube   [] PEG Tube    [] Rectal Tube [] Drain  [] Other:     Constitutional: Elderly, restless, alert to person  Eyes: Reactive  ENMT: Unremarkable  Cardiovascular: Irregular irregular  Respiratory: No significant distress, no work of breathing, remains on oxygen, respiratory rate around 20  Gastrointestinal: Soft  Musculoskeletal: Incision in the right shoulder area is clean dry and intact  Skin: Unremarkable  Neurologic: Nonfocal  Psychiatric: Slightly restless  Other:       Pertinent Lab and or Imaging Tests:  Lab Results   Component Value Date/Time    Sodium 136 10/14/2020 02:48 AM    Potassium 4.1 10/14/2020 02:48 AM    Chloride 101 10/14/2020 02:48 AM    CO2 25 10/14/2020 02:48 AM    Anion gap 10 10/14/2020 02:48 AM    Glucose 125 (H) 10/14/2020 02:48 AM    BUN 13 10/14/2020 02:48 AM    Creatinine 0.75 10/14/2020 02:48 AM    BUN/Creatinine ratio 17 10/14/2020 02:48 AM    GFR est AA >60 10/14/2020 02:48 AM    GFR est non-AA >60 10/14/2020 02:48 AM    Calcium 8.5 10/14/2020 02:48 AM     Lab Results   Component Value Date/Time    Protein, total 5.5 (L) 10/14/2020 02:48 AM    Albumin 2.3 (L) 10/14/2020 02:48 AM           Total time:   Counseling / coordination time:   > 50% counseling / coordination?:

## 2020-10-16 NOTE — PROGRESS NOTES
NUTRITION    RD PLAN OF CARE:   Chart reviewed for follow-up, discussed with Nursing. Pt has been transitioned to hospice care. Aggressive nutrition intervention is no longer indicated. Will sign off, but happy to assist if needed.     Princess Elzbieta RD  Available via [employer]  Or Pager# [phone]

## 2020-10-16 NOTE — HOSPICE
[facility]  [phone]    GIP Daily Nursing Note   Patient Name: Tristian Ayala  YOB: 1927  Age: 80 y.o. Date of Visit: 10/16/20  Facility of Care: [facility]  Patient Room: [facility]/     Hospice Attending: Vincent Shelton MD  Hospice Diagnosis: End stage heart failure ([facility] [address] [I50.84]    Level of Care: GIP    Current GIP Symptoms    1. Agitation and anxiety  2. Labored breathing           ASSESSMENT & PLAN     1. Continue PRN haldol IV 1 mg every one hour as needed. 2. Continue dilaudid 0.2 mg IV every 30 minutes as needed. Spiritual Interventions:  to see as needed     Psych/ Social/ Emotional Interventions: MSW following     Care Coordination Needs: Communicate with MD/family/hospital team    Care plan and New Orders discussed / approved with Dr. Rui Gresham MD.    Description History and Chart Review     Narrative History of last 24 hours that demonstrates care cannot be provided in another setting:  Patient has required medication adjustment to get patient home, patient is leaving today for home hospice. What has been done to control the patient's symptoms in the last 24 hours? Patient's medications adjusted from morphine and lorazepam to dilaudid and haldol. Does the patient currently require IV medications? Yes  Does the patient currently require scheduled medications? No  Does the patient currently require a PCA?  No    List number of doses of PRN medications in last 24 hours:  Medication 1: Haldol 1 mg   Number of doses: One    Medication 2:  Morphine 1 mg   Number of doses: One    Medication 3:   Number of doses:    Supporting documentation for GIP need for pain control:  [x] Frequent evaluation by a doctor, nurse practitioner, nurse   [x] Frequent medication adjustment    [] IVs that cannot be administered at home   [] Aggressive pain management   [] Complicated technical delivery of medications              Supporting documentation for GIP need for symptom control:  [x]  Sudden decline necessitating intensive nursing intervention  []  Uncontrolled / intractable nausea or vomiting   []  Pathological fractures  []  Advanced open wounds requiring frequent skilled care  [] Unmanageable respiratory distress  [] New or worsening delirium   [x] Delirium with behavior issues: Is 24 hour caregiver present due to safety concerns with agitation? no  [] Imminent death - with skilled nursing needs documented above    DISCHARGE PLANNING     1. Discharge Plan: Home today,  time scheduled for 1230 with [facility]  2. Patient/Family teaching: no family present   3.  Response to patient/family teaching: N/A    ASSESSMENT    KARNOFSKY: 20    Prognosis estimated based on 10/16/20 clinical assessment is:   [] Few to Many Hours  [x] Hours to Days   [] Few to Many Days   [] Days to Weeks   [] Few to Many Weeks   [] Weeks to Months   [] Few to Many Months    Quality Measure: Patient self-reports:  [] Yes    [x] No    ESAS:   Time of Assessment: 1045  Pain (1-10):0  Fatigue (1-10): 3  Shortness of breath (1-10):7  Nausea (1-10): 0  Appetite (1-10): 0  Anxiety: (1-10): 7  Depression: (1-10): 0  Well-being: (1-10): 0  Constipation: No      CLINICAL INFORMATION     Patient Vitals for the past 12 hrs:   Temp Pulse Resp BP SpO2   10/16/20 0814 98.1 °F (36.7 °C) (!) 119 20 (!) 162/120 (!) 75 %   10/16/20 0417 -- -- -- -- 92 %       Currently this patient has:  [x] Supplemental O2   [] IV    [] PICC      [] PORT   [] NG Tube    [] PEG Tube   [] Ostomy     [] Murphy draining _______ urine  [] Other:     SIGNS/PHYSICAL FINDINGS     Skin (including wound):  [] Warm, dry, supple, intact and color normal for race  [x] Warm   [x] Dry   [] Cool     [] Clammy       [] Diaphoretic    Turgor   [] Normal   [x] Decreased  Color:   [] Pink   [x] Pale   [x] Cyanotic   [] Erythema   [] Jaundice   [] Normal for Race  [x]  Wounds: Right shoulder     Neuro:  [] Lethargy  [x] Restlessness / agitation  [x] Confusion / delirium  [] Hallucinations  [] Responds to maximal stimulation  [] Unresponsive  [] Seizures     Cardiac:  [] Dyspnea on Exertion  [] JVD  [] Murmur  [] Palpitations  [] Hypotension  [x] Hypertension  [x] Tachycardia  [] Bradycardia  [] Irregular HR  [] Pulses Decreased  [] Pulses Absent  [] Edema:        [] Mottling:      Respiratory:  Breath sounds:    [x] Diminished   [] Wheeze   [] Rhonchi   [] Rales   [] Even and unlabored  [x] Labored: dyspnea   [] Cough   [] Non Productive   [] Productive    [] Description:           [] Deep suctioned   [x] O2 at 6  LPM  [] High flow oxygen greater than 10 LPM  [] Bi-Pap    GI  [x] Abdomen rounded and soft   [] Ascites  [] Nausea  [] Vomiting  [] Incontinent of bowels  [x] Bowel sounds yes  [] Diarrhea  [] Constipation (see above including last bowel movement)  [] Checked for impaction  [] Last BM     Nutrition  Diet:   Appetite:   [] Good   [] Fair   [x] Poor   [] Tube Feeding       [x] Voiding  [x] Incontinent   [] Murphy    Musculoskeletal  [] Balance/[place] Unsteady   [x] Weak   Strength:    [] Normal    [] Limited    [x] Decreasing   Activities:    [] Up as tolerated   [x] Bedridden    [] Specify:    SAFETY  [] 24 hr. Caregiver   [x] Side rails ? [x] Hospital bed   [] Reviewed Falls & Safety       ALLERGIES AND MEDICATIONS     Allergies:    Allergies   Allergen Reactions    Ambien [Zolpidem] Unknown (comments)    Fosamax [Alendronate] Unknown (comments)       Current Facility-Administered Medications   Medication Dose Route Frequency    haloperidol lactate (HALDOL) injection 1 mg  1 mg IntraVENous Q1H PRN    HYDROmorphone (PF) (DILAUDID) injection 0.2 mg  0.2 mg IntraVENous Q30MIN PRN    acetaminophen (TYLENOL) solution 650 mg  650 mg Oral Q4H PRN    Or    acetaminophen (TYLENOL) suppository 650 mg  650 mg Rectal Q4H PRN    hyoscyamine SL (LEVSIN/SL) tablet 0.125 mg  0.125 mg SubLINGual Q4H PRN    bisacodyL (DULCOLAX) suppository 10 mg  10 mg Rectal DAILY PRN          Visit Time In: 0911  Visit Time Out: 8984

## 2020-10-16 NOTE — HOSPICE
Patient will go home today at 15 31 as previously planned. SRK has been ordered from [facility] for delivery tomorrow to the son's home. Dilaudid 1mg/ml and Haldol 2mg/ml ordered from [facility]. Liaison will  and deliver to unit prior to discharge.    Report given to anette in nurse, Antonetta Hamman RN Kandis Ivory RN MSN [facility]  Nurse Liaison  [facility]  [phone] ( [facility]  [phone] ( office)

## 2020-10-20 ENCOUNTER — HOME CARE VISIT (OUTPATIENT)
Dept: HOSPICE | Facility: HOSPICE | Age: 85
End: 2020-10-20
Payer: MEDICARE

## 2020-10-20 LAB
GLUCOSE BLD STRIP.AUTO-MCNC: 128 MG/DL (ref 65–100)
SERVICE CMNT-IMP: ABNORMAL

## 2020-10-20 PROCEDURE — 3331090001 HH PPS REVENUE CREDIT

## 2020-10-20 PROCEDURE — 3331090002 HH PPS REVENUE DEBIT

## 2020-10-21 PROCEDURE — 3331090001 HH PPS REVENUE CREDIT

## 2020-10-21 PROCEDURE — 3331090002 HH PPS REVENUE DEBIT

## 2020-10-22 PROCEDURE — 3331090003 HH PPS REVENUE ADJ

## 2020-10-22 PROCEDURE — 3331090001 HH PPS REVENUE CREDIT

## 2020-10-22 PROCEDURE — 3331090002 HH PPS REVENUE DEBIT

## 2024-06-05 NOTE — PROGRESS NOTES
MANDA: 
RUR: 16% Disposition:  Home with son Rosas Atkinson and Daughter--in--law Katherine Glynn). CM met with patient and son Rosas Atkinson) at bedside. CM confirmed plans for discharge. Patient known to [facility] prior to this admission. Son had questions re skin care protocol to buttock. CM deferred questions/concerns to assigned nurse Hernandez Garcia RN) asking that she speak with son. . 
 
CM contacted [facility] Kiran Archibald RN liaison). Discussed recent visit with patient and son Rosas Atkinson). Sonali Guerra, TAL requesting updated [facility] orders for [place] [facility], PT, OT, HHA and SLP. [facility] transport recommended when patient ready for discharge. Ivy Monroe, [address]7

## (undated) DEVICE — FORCEPS BX L240CM JAW DIA2.8MM L CAP W/ NDL MIC MESH TOOTH

## (undated) DEVICE — TUBING HYDR IRR --